# Patient Record
Sex: FEMALE | Race: BLACK OR AFRICAN AMERICAN | NOT HISPANIC OR LATINO | URBAN - METROPOLITAN AREA
[De-identification: names, ages, dates, MRNs, and addresses within clinical notes are randomized per-mention and may not be internally consistent; named-entity substitution may affect disease eponyms.]

---

## 2017-08-01 ENCOUNTER — EMERGENCY (EMERGENCY)
Facility: HOSPITAL | Age: 67
LOS: 0 days | Discharge: HOME | End: 2017-08-01
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
## 2017-08-01 DIAGNOSIS — M25.512 PAIN IN LEFT SHOULDER: ICD-10-CM

## 2017-08-01 DIAGNOSIS — M54.5 LOW BACK PAIN: ICD-10-CM

## 2017-08-01 DIAGNOSIS — M54.12 RADICULOPATHY, CERVICAL REGION: ICD-10-CM

## 2017-08-01 DIAGNOSIS — S39.012A STRAIN OF MUSCLE, FASCIA AND TENDON OF LOWER BACK, INITIAL ENCOUNTER: ICD-10-CM
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
## 2017-08-01 DIAGNOSIS — Z88.2 ALLERGY STATUS TO SULFONAMIDES: ICD-10-CM
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
## 2017-08-31 ENCOUNTER — OUTPATIENT (OUTPATIENT)
Dept: OUTPATIENT SERVICES | Facility: HOSPITAL | Age: 67
LOS: 1 days | Discharge: HOME | End: 2017-08-31

## 2017-08-31 ENCOUNTER — APPOINTMENT (OUTPATIENT)
Dept: INTERNAL MEDICINE | Facility: CLINIC | Age: 67
End: 2017-08-31

## 2017-08-31 VITALS
HEART RATE: 71 BPM | HEIGHT: 59 IN | DIASTOLIC BLOOD PRESSURE: 89 MMHG | BODY MASS INDEX: 42.94 KG/M2 | WEIGHT: 213 LBS | SYSTOLIC BLOOD PRESSURE: 147 MMHG

## 2017-08-31 DIAGNOSIS — R52 PAIN, UNSPECIFIED: ICD-10-CM

## 2017-08-31 DIAGNOSIS — Z78.9 OTHER SPECIFIED HEALTH STATUS: ICD-10-CM

## 2017-09-01 LAB — CYTOLOGY CVX/VAG DOC THIN PREP: NEGATIVE

## 2017-09-05 DIAGNOSIS — E78.00 PURE HYPERCHOLESTEROLEMIA, UNSPECIFIED: ICD-10-CM

## 2017-09-05 DIAGNOSIS — R73.9 HYPERGLYCEMIA, UNSPECIFIED: ICD-10-CM

## 2017-09-05 DIAGNOSIS — M54.2 CERVICALGIA: ICD-10-CM

## 2017-09-05 DIAGNOSIS — M54.5 LOW BACK PAIN: ICD-10-CM

## 2017-10-30 LAB
ALBUMIN SERPL-MCNC: 4.4 G/DL
ALBUMIN/GLOB SERPL: 1.38
ALP SERPL-CCNC: 69 IU/L
ALT SERPL-CCNC: 19 IU/L
ANA TITR SER: NEGATIVE
ANION GAP SERPL CALC-SCNC: 13 MEQ/L
AST SERPL-CCNC: 26 IU/L
BASOPHILS # BLD: 0.02 TH/MM3
BASOPHILS NFR BLD: 0.3 %
BILIRUB SERPL-MCNC: 0.7 MG/DL
BUN SERPL-MCNC: 12 MG/DL
BUN/CREAT SERPL: 14.5 %
CALCIUM SERPL-MCNC: 10 MG/DL
CHLORIDE SERPL-SCNC: 103 MEQ/L
CHOLEST SERPL-MCNC: 251 MG/DL
CO2 SERPL-SCNC: 26 MEQ/L
CREAT SERPL-MCNC: 0.83 MG/DL
CRP SERPL-MCNC: 0.5 MG/DL
DIFFERENTIAL METHOD BLD: NORMAL
EOSINOPHIL # BLD: 0.2 TH/MM3
EOSINOPHIL NFR BLD: 3.1 %
ERYTHROCYTE [DISTWIDTH] IN BLOOD BY AUTOMATED COUNT: 14.1 %
ERYTHROCYTE [SEDIMENTATION RATE] IN BLOOD: 43 MM/HR
ESTIMATED AVERGAGE GLUCOSE (NORTH): 128 MG/DL
GFR SERPL CREATININE-BSD FRML MDRD: 83
GLUCOSE SERPL-MCNC: 93 MG/DL
GRANULOCYTES # BLD: 2.89 TH/MM3
GRANULOCYTES NFR BLD: 44.9 %
HBA1C MFR BLD: 6.1 %
HCT VFR BLD AUTO: 42.1 %
HDLC SERPL-MCNC: 70 MG/DL
HDLC SERPL: 3.59
HGB BLD-MCNC: 13.6 G/DL
IMM GRANULOCYTES # BLD: 0.02 TH/MM3
IMM GRANULOCYTES NFR BLD: 0.3 %
LDLC SERPL DIRECT ASSAY-MCNC: 163 MG/DL
LYMPHOCYTES # BLD: 2.73 TH/MM3
LYMPHOCYTES NFR BLD: 42.5 %
MCH RBC QN AUTO: 28.7 PG
MCHC RBC AUTO-ENTMCNC: 32.3 G/DL
MCV RBC AUTO: 88.8 FL
MONOCYTES # BLD: 0.57 TH/MM3
MONOCYTES NFR BLD: 8.9 %
PLATELET # BLD: 428 TH/MM3
PMV BLD AUTO: 9.8 FL
POTASSIUM SERPL-SCNC: 4.4 MMOL/L
PROT SERPL-MCNC: 7.6 G/DL
RBC # BLD AUTO: 4.74 MIL/MM3
SODIUM SERPL-SCNC: 142 MEQ/L
TRIGL SERPL-MCNC: 75 MG/DL
VLDLC SERPL-MCNC: 15 MG/DL
WBC # BLD: 6.43 TH/MM3

## 2017-10-31 LAB — RHEUMATOID FACT SERPL-ACNC: < 14 IU/ML

## 2020-07-14 ENCOUNTER — EMERGENCY (EMERGENCY)
Facility: HOSPITAL | Age: 70
LOS: 0 days | Discharge: HOME | End: 2020-07-14
Attending: EMERGENCY MEDICINE | Admitting: EMERGENCY MEDICINE
Payer: MEDICARE

## 2020-07-14 VITALS
SYSTOLIC BLOOD PRESSURE: 153 MMHG | TEMPERATURE: 99 F | RESPIRATION RATE: 18 BRPM | DIASTOLIC BLOOD PRESSURE: 76 MMHG | HEART RATE: 96 BPM | OXYGEN SATURATION: 96 %

## 2020-07-14 DIAGNOSIS — R53.1 WEAKNESS: ICD-10-CM

## 2020-07-14 DIAGNOSIS — R05 COUGH: ICD-10-CM

## 2020-07-14 DIAGNOSIS — R42 DIZZINESS AND GIDDINESS: ICD-10-CM

## 2020-07-14 DIAGNOSIS — R55 SYNCOPE AND COLLAPSE: ICD-10-CM

## 2020-07-14 LAB
ALBUMIN SERPL ELPH-MCNC: 4.4 G/DL — SIGNIFICANT CHANGE UP (ref 3.5–5.2)
ALP SERPL-CCNC: 77 U/L — SIGNIFICANT CHANGE UP (ref 30–115)
ALT FLD-CCNC: 16 U/L — SIGNIFICANT CHANGE UP (ref 0–41)
ANION GAP SERPL CALC-SCNC: 12 MMOL/L — SIGNIFICANT CHANGE UP (ref 7–14)
APPEARANCE UR: CLEAR — SIGNIFICANT CHANGE UP
AST SERPL-CCNC: 20 U/L — SIGNIFICANT CHANGE UP (ref 0–41)
BASOPHILS # BLD AUTO: 0.02 K/UL — SIGNIFICANT CHANGE UP (ref 0–0.2)
BASOPHILS NFR BLD AUTO: 0.3 % — SIGNIFICANT CHANGE UP (ref 0–1)
BILIRUB SERPL-MCNC: 0.4 MG/DL — SIGNIFICANT CHANGE UP (ref 0.2–1.2)
BILIRUB UR-MCNC: NEGATIVE — SIGNIFICANT CHANGE UP
BUN SERPL-MCNC: 12 MG/DL — SIGNIFICANT CHANGE UP (ref 10–20)
CALCIUM SERPL-MCNC: 9.7 MG/DL — SIGNIFICANT CHANGE UP (ref 8.5–10.1)
CHLORIDE SERPL-SCNC: 98 MMOL/L — SIGNIFICANT CHANGE UP (ref 98–110)
CO2 SERPL-SCNC: 27 MMOL/L — SIGNIFICANT CHANGE UP (ref 17–32)
COLOR SPEC: COLORLESS — SIGNIFICANT CHANGE UP
CREAT SERPL-MCNC: 0.9 MG/DL — SIGNIFICANT CHANGE UP (ref 0.7–1.5)
DIFF PNL FLD: NEGATIVE — SIGNIFICANT CHANGE UP
EOSINOPHIL # BLD AUTO: 0.1 K/UL — SIGNIFICANT CHANGE UP (ref 0–0.7)
EOSINOPHIL NFR BLD AUTO: 1.4 % — SIGNIFICANT CHANGE UP (ref 0–8)
GLUCOSE SERPL-MCNC: 165 MG/DL — HIGH (ref 70–99)
GLUCOSE UR QL: NEGATIVE — SIGNIFICANT CHANGE UP
HCT VFR BLD CALC: 42.8 % — SIGNIFICANT CHANGE UP (ref 37–47)
HGB BLD-MCNC: 13.8 G/DL — SIGNIFICANT CHANGE UP (ref 12–16)
IMM GRANULOCYTES NFR BLD AUTO: 0.1 % — SIGNIFICANT CHANGE UP (ref 0.1–0.3)
KETONES UR-MCNC: NEGATIVE — SIGNIFICANT CHANGE UP
LEUKOCYTE ESTERASE UR-ACNC: NEGATIVE — SIGNIFICANT CHANGE UP
LYMPHOCYTES # BLD AUTO: 1.54 K/UL — SIGNIFICANT CHANGE UP (ref 1.2–3.4)
LYMPHOCYTES # BLD AUTO: 22.3 % — SIGNIFICANT CHANGE UP (ref 20.5–51.1)
MCHC RBC-ENTMCNC: 28.9 PG — SIGNIFICANT CHANGE UP (ref 27–31)
MCHC RBC-ENTMCNC: 32.2 G/DL — SIGNIFICANT CHANGE UP (ref 32–37)
MCV RBC AUTO: 89.5 FL — SIGNIFICANT CHANGE UP (ref 81–99)
MONOCYTES # BLD AUTO: 0.46 K/UL — SIGNIFICANT CHANGE UP (ref 0.1–0.6)
MONOCYTES NFR BLD AUTO: 6.6 % — SIGNIFICANT CHANGE UP (ref 1.7–9.3)
NEUTROPHILS # BLD AUTO: 4.79 K/UL — SIGNIFICANT CHANGE UP (ref 1.4–6.5)
NEUTROPHILS NFR BLD AUTO: 69.3 % — SIGNIFICANT CHANGE UP (ref 42.2–75.2)
NITRITE UR-MCNC: NEGATIVE — SIGNIFICANT CHANGE UP
NRBC # BLD: 0 /100 WBCS — SIGNIFICANT CHANGE UP (ref 0–0)
PH UR: 7 — SIGNIFICANT CHANGE UP (ref 5–8)
PLATELET # BLD AUTO: 412 K/UL — HIGH (ref 130–400)
POTASSIUM SERPL-MCNC: 4.4 MMOL/L — SIGNIFICANT CHANGE UP (ref 3.5–5)
POTASSIUM SERPL-SCNC: 4.4 MMOL/L — SIGNIFICANT CHANGE UP (ref 3.5–5)
PROT SERPL-MCNC: 7.9 G/DL — SIGNIFICANT CHANGE UP (ref 6–8)
PROT UR-MCNC: NEGATIVE — SIGNIFICANT CHANGE UP
RBC # BLD: 4.78 M/UL — SIGNIFICANT CHANGE UP (ref 4.2–5.4)
RBC # FLD: 13.1 % — SIGNIFICANT CHANGE UP (ref 11.5–14.5)
SODIUM SERPL-SCNC: 137 MMOL/L — SIGNIFICANT CHANGE UP (ref 135–146)
SP GR SPEC: 1 — LOW (ref 1.01–1.02)
TROPONIN T SERPL-MCNC: <0.01 NG/ML — SIGNIFICANT CHANGE UP
UROBILINOGEN FLD QL: SIGNIFICANT CHANGE UP
WBC # BLD: 6.92 K/UL — SIGNIFICANT CHANGE UP (ref 4.8–10.8)
WBC # FLD AUTO: 6.92 K/UL — SIGNIFICANT CHANGE UP (ref 4.8–10.8)

## 2020-07-14 PROCEDURE — 93010 ELECTROCARDIOGRAM REPORT: CPT

## 2020-07-14 PROCEDURE — 99285 EMERGENCY DEPT VISIT HI MDM: CPT

## 2020-07-14 RX ORDER — SODIUM CHLORIDE 9 MG/ML
1000 INJECTION INTRAMUSCULAR; INTRAVENOUS; SUBCUTANEOUS ONCE
Refills: 0 | Status: COMPLETED | OUTPATIENT
Start: 2020-07-14 | End: 2020-07-14

## 2020-07-14 RX ADMIN — SODIUM CHLORIDE 1000 MILLILITER(S): 9 INJECTION INTRAMUSCULAR; INTRAVENOUS; SUBCUTANEOUS at 15:18

## 2020-07-14 NOTE — ED PROVIDER NOTE - NS ED ROS FT
Constitutional: + gen weakness. no fever, chills, no recent weight loss, change in appetite or malaise  Eyes: no redness/discharge/pain/vision changes  ENT: no rhinorrhea/ear pain/sore throat  Cardiac: No chest pain, SOB or edema.  Respiratory: No cough or respiratory distress  GI: No nausea, vomiting, diarrhea or abdominal pain.  : No dysuria, frequency, urgency or hematuria  MS: no pain to back or extremities, no loss of ROM, no weakness  Neuro: + lightheadedness. No headache. No LOC.  Skin: No skin rash.  Endocrine: No history of thyroid disease or diabetes.  Except as documented in the HPI, all other systems are negative.

## 2020-07-14 NOTE — ED PROVIDER NOTE - OBJECTIVE STATEMENT
69 year old F with no pmhx c/o gen weakness/lightheadedness x 1 week. Pt sts was hot in her apartment because AC is broken so went for walk outside. Pt felt weak/lightheaded and had episode of near syncope so called EMS. Pt now c/o gen weakness. + chronic dry cough. Denies any assoc fever, sob, chest pain, n/v/d/abd pain, urinary symptoms, headache, palpitations.

## 2020-07-14 NOTE — ED PROVIDER NOTE - NSFOLLOWUPINSTRUCTIONS_ED_ALL_ED_FT
Near-Syncope    Near-syncope is when you suddenly become weak or dizzy, or you feel like you might pass out (faint). During an episode of near-syncope, you may:  Feel dizzy or light-headed.  Feel nauseous.  See all white or all black in your field of vision.  Have cold, clammy skin.  This condition is caused by a sudden decrease in blood flow to the brain. This decrease can result from various causes, but most of those causes are not dangerous. However, near-syncope can be a sign of a serious medical problem, so it is important to seek medical care.    If you fainted, get medical help right away.Call your local emergency services ([phone] in the [place] Do not drive yourself to the hospital.    Please follow up with your primary care doctor within one week.  Follow these instructions at home:    Pay attention to any changes in your symptoms. Take these actions to help with your condition:  Have someone stay with you until you feel stable.  Do not drive, use machinery, or play sports until your health care provider says it is okay.  Keep all follow-up visits as told by your health care provider. This is important.  If you start to feel like you might faint, lie down right away and raise (elevate) your feet above the level of your heart. Breathe deeply and steadily. Wait until all of the symptoms have passed.  Drink enough fluid to keep your urine clear or pale yellow.  If you are taking blood pressure or heart medicine, get up slowly and take several minutes to sit and then stand. This can reduce dizziness.  Take over-the-counter and prescription medicines only as told by your health care provider.  Get help right away if:  You have a severe headache.  You have unusual pain in your chest, abdomen, or back.  You are bleeding from your mouth or rectum, or you have black or tarry stool.  You have a very fast or irregular heartbeat (palpitations).  You faint once or repeatedly.  You have a seizure.  You are confused.  You have trouble walking.  You have severe weakness.  You have vision problems.  These symptoms may represent a serious problem that is an emergency. Do not wait to see if your symptoms will go away. Get medical help right away. Call your local emergency services (661 in the [place] Do not drive yourself to the hospital.     This information is not intended to replace advice given to you by your health care provider. Make sure you discuss any questions you have with your health care provider.

## 2020-07-14 NOTE — ED PROVIDER NOTE - PHYSICAL EXAMINATION
CONSTITUTIONAL: Well-appearing; well-nourished; in no apparent distress.   EYES: PERRL; EOM intact.   ENT: normal nose; no rhinorrhea; normal pharynx with no tonsillar hypertrophy.   NECK: Supple; non-tender; no cervical lymphadenopathy.  CARDIOVASCULAR: Normal S1, S2; no murmurs, rubs, or gallops.   RESPIRATORY: Normal chest excursion with respiration; breath sounds clear and equal bilaterally; no wheezes, rhonchi, or rales.  GI/: Normal bowel sounds; non-distended; non-tender; no palpable organomegaly.   MS: No evidence of trauma or deformity.  Normal ROM in all four extremities; non-tender to palpation; distal pulses are normal.   SKIN: Normal for age and race; warm; dry; good turgor; no apparent lesions or exudate.   NEURO/PSYCH: A & O x 4; grossly unremarkable. mood and manner are appropriate. No focal deficits. No facial droop. No tongue deviation. Cerebellar intact. Normal gait. Sensation intact

## 2020-07-14 NOTE — ED PROVIDER NOTE - NSFOLLOWUPCLINICS_GEN_ALL_ED_FT
[facility]  Medicine  [address], [place]   Phone: [phone]  Fax:   Follow Up Time:

## 2020-07-14 NOTE — ED PROVIDER NOTE - CLINICAL SUMMARY MEDICAL DECISION MAKING FREE TEXT BOX
evaluated for lightheadeness, here patient is stable with nml ambulation and asymptomatic, her exam is nml and lab work is nml. no signs of ischemia or electrolyte abn.

## 2020-07-14 NOTE — ED PROVIDER NOTE - PATIENT PORTAL LINK FT
You can access the FollowMyHealth Patient Portal offered by [facility] by registering at the following website: [email or web address]. By joining [facility]’s FollowMyHealth portal, you will also be able to view your health information using other applications (apps) compatible with our system.

## 2020-07-14 NOTE — ED PROVIDER NOTE - ATTENDING CONTRIBUTION TO CARE
1 week of generalized weakness in the setting of broken AC and being in the heat. she states she went outside to get fresh air and felt lightheaded so called ems, denies chest pain, sob, headache. now symptoms have resolved. exam shows nml gait, nml finger to nose, lungs and heart nml, abd soft, ext nml. plan is to obtain basic labs, ivf hydration and reassess.

## 2020-07-14 NOTE — ED ADULT NURSE NOTE - NSIMPLEMENTINTERV_GEN_ALL_ED
Implemented All Universal Safety Interventions:  [place] to call system. Call bell, personal items and telephone within reach. Instruct patient to call for assistance. Room bathroom lighting operational. Non-slip footwear when patient is off stretcher. Physically safe environment: no spills, clutter or unnecessary equipment. Stretcher in lowest position, wheels locked, appropriate side rails in place.

## 2020-07-15 PROCEDURE — 71046 X-RAY EXAM CHEST 2 VIEWS: CPT | Mod: 26

## 2020-08-05 ENCOUNTER — OUTPATIENT (OUTPATIENT)
Dept: OUTPATIENT SERVICES | Facility: HOSPITAL | Age: 70
LOS: 1 days | Discharge: HOME | End: 2020-08-05

## 2020-08-05 ENCOUNTER — APPOINTMENT (OUTPATIENT)
Dept: INTERNAL MEDICINE | Facility: CLINIC | Age: 70
End: 2020-08-05
Payer: MEDICARE

## 2020-08-05 VITALS
HEIGHT: 59 IN | HEART RATE: 77 BPM | SYSTOLIC BLOOD PRESSURE: 137 MMHG | DIASTOLIC BLOOD PRESSURE: 85 MMHG | BODY MASS INDEX: 43.34 KG/M2 | TEMPERATURE: 98.4 F | WEIGHT: 215 LBS

## 2020-08-05 DIAGNOSIS — R25.1 TREMOR, UNSPECIFIED: ICD-10-CM

## 2020-08-05 PROCEDURE — 99214 OFFICE O/P EST MOD 30 MIN: CPT | Mod: GC

## 2020-08-05 RX ORDER — METHOCARBAMOL 500 MG/1
500 TABLET, FILM COATED ORAL
Refills: 0 | Status: DISCONTINUED | COMMUNITY
End: 2020-08-05

## 2020-08-05 RX ORDER — ASPIRIN 81 MG/1
81 TABLET, CHEWABLE ORAL
Refills: 0 | Status: DISCONTINUED | COMMUNITY
End: 2020-08-05

## 2020-08-05 RX ORDER — DICLOFENAC SODIUM 75 MG/1
75 TABLET, DELAYED RELEASE ORAL TWICE DAILY
Qty: 28 | Refills: 0 | Status: DISCONTINUED | COMMUNITY
Start: 2017-08-31 | End: 2020-08-05

## 2020-08-05 RX ORDER — UBIDECARENONE/VIT E ACET 100MG-5
25 MCG CAPSULE ORAL
Refills: 0 | Status: DISCONTINUED | COMMUNITY
End: 2020-08-05

## 2020-08-05 RX ORDER — GABAPENTIN 300 MG/1
300 CAPSULE ORAL TWICE DAILY
Qty: 60 | Refills: 3 | Status: DISCONTINUED | COMMUNITY
Start: 2017-08-31 | End: 2020-08-05

## 2020-08-05 NOTE — HISTORY OF PRESENT ILLNESS
[FreeTextEntry1] : ER follow up  [de-identified] : Pt is 69 year old female with PMHx of neck and lower back pain, pre-DM, DLD, right eye surgery?, presents for follow up after ER visit on 7/14/2020. Pt was last seen 3 years ago. She went to hospital after becoming very hot in her apt and felt weak, shaky, and about to collapse. Pt was seen in ED and labs, CXR, and EKG were unremarkable. Pt states that she often gets "shakes" in all four extremities. No LOC, loss of urine or bowel control, or any other symptoms described. Pt also Reports ongoing tinnitus and head/ear fullness for many years which was previously worked up by ENT as per pt and no cause was found.

## 2020-08-05 NOTE — ASSESSMENT
[FreeTextEntry1] : Pt is 69 year old female with PMHx of neck and lower back pain, pre-DM, DLD, right eye surgery?, presents for follow up after ER visit on 7/14/2020. \par \par # intermittent weakness with "shaking"\par - recent labs in ED unremarkable\par - EKG unremarkable\par - will order TSH, Vit D\par \par # ongoing visual disturbance- cataracts noted + possible diabetic eye disease\par - had procedure at [facility] a number of years ago, unsure nature of procedure\par - Ophthal referral\par - neuro referral\par - f/u  A1c\par - artificial tears\par \par # ear fullness and head pressure\par - reports negative workup by ENT in the past but still ongoing\par - ENT referral\par \par # Pre-DM\par - elevated blood glucose in ED\par - 8/2017 A1c 6.1- f/u repeat A1c\par - diet and lifestyle modification encouraged\par \par # HCM\par - pt declines mammo for cultural reasons\par - pt defers colonoscopy for later date\par - pap in 2015 neg as per pt\par - pt had PPSV in 2015, defers [id number] for later date\par - Tdap last 1/2012\par - f/u A1c if podiatry indicated

## 2020-08-05 NOTE — REVIEW OF SYSTEMS
[Fatigue] : fatigue [Redness] : redness [Cough] : cough [Vision Problems] : vision problems [Headache] : headache [Muscle Pain] : muscle pain [Back Pain] : back pain [Negative] : Heme/Lymph [Insomnia] : no insomnia [Suicidal] : not suicidal [Anxiety] : no anxiety [Depression] : no depression [FreeTextEntry4] : tinnitus, ear fullness [FreeTextEntry2] : generalized weakness

## 2020-08-05 NOTE — END OF VISIT
[] : Resident [FreeTextEntry3] : I personally discussed this patient with the resident at the time of the visit.  And I was present with the resident during the key portions of the history and exam.  I agree with the assessment and plan as written, unless noted below.\par \par Pt. was seen in ER 7/14/20 for generalized weakness, and lightheadedness, shakiness here for follow up.  Pt. c/o visual disturbances, and headache/lightheadedness and sometime feels her heartbeat through her ear when lying in bed, no chest pain, no sob, no palpitation.  Will refer pt. to ENT, and neurology for evaluation.  On exam, pt. has b/l cataract, will refer to ophthal.  Pt. declined mammogram for culture reason, and defer colorectal cancer screening.  Will order blood work, and follow up visit in 3 months

## 2020-08-05 NOTE — PHYSICAL EXAM
[No Acute Distress] : no acute distress [EOMI] : extraocular movements intact [No JVD] : no jugular venous distention [No Respiratory Distress] : no respiratory distress  [No Accessory Muscle Use] : no accessory muscle use [Clear to Auscultation] : lungs were clear to auscultation bilaterally [Regular Rhythm] : with a regular rhythm [Normal Rate] : normal rate  [Normal S1, S2] : normal S1 and S2 [Soft] : abdomen soft [Non-distended] : non-distended [Non Tender] : non-tender [Normal Bowel Sounds] : normal bowel sounds [No CVA Tenderness] : no CVA  tenderness [No Spinal Tenderness] : no spinal tenderness [No Rash] : no rash [Coordination Grossly Intact] : coordination grossly intact [Grossly Normal Strength/Tone] : grossly normal strength/tone [No Focal Deficits] : no focal deficits [Normal Affect] : the affect was normal [de-identified] : obese [de-identified] : injected conjunctiva, BL cataracts noted

## 2020-08-10 DIAGNOSIS — R25.1 TREMOR, UNSPECIFIED: ICD-10-CM

## 2020-08-10 DIAGNOSIS — R73.9 HYPERGLYCEMIA, UNSPECIFIED: ICD-10-CM

## 2020-08-10 DIAGNOSIS — M54.5 LOW BACK PAIN: ICD-10-CM

## 2020-08-10 DIAGNOSIS — Z00.00 ENCOUNTER FOR GENERAL ADULT MEDICAL EXAMINATION WITHOUT ABNORMAL FINDINGS: ICD-10-CM

## 2020-08-10 DIAGNOSIS — H26.9 UNSPECIFIED CATARACT: ICD-10-CM

## 2020-08-10 DIAGNOSIS — E78.5 HYPERLIPIDEMIA, UNSPECIFIED: ICD-10-CM

## 2020-08-12 LAB
25(OH)D3 SERPL-MCNC: 20 NG/ML
CHOLEST SERPL-MCNC: 257 MG/DL
CHOLEST/HDLC SERPL: 3.8 RATIO
ESTIMATED AVERAGE GLUCOSE: 154 MG/DL
HBA1C MFR BLD HPLC: 7 %
HDLC SERPL-MCNC: 68 MG/DL
LDLC SERPL CALC-MCNC: 178 MG/DL
TRIGL SERPL-MCNC: 75 MG/DL
TSH SERPL-ACNC: 1 UIU/ML
VIT B12 SERPL-MCNC: 922 PG/ML

## 2020-08-18 ENCOUNTER — OUTPATIENT (OUTPATIENT)
Dept: OUTPATIENT SERVICES | Facility: HOSPITAL | Age: 70
LOS: 1 days | Discharge: HOME | End: 2020-08-18
Payer: MEDICARE

## 2020-08-18 PROCEDURE — 92004 COMPRE OPH EXAM NEW PT 1/>: CPT

## 2020-08-19 ENCOUNTER — APPOINTMENT (OUTPATIENT)
Dept: INTERNAL MEDICINE | Facility: CLINIC | Age: 70
End: 2020-08-19
Payer: MEDICARE

## 2020-08-19 ENCOUNTER — OUTPATIENT (OUTPATIENT)
Dept: OUTPATIENT SERVICES | Facility: HOSPITAL | Age: 70
LOS: 1 days | Discharge: HOME | End: 2020-08-19

## 2020-08-19 DIAGNOSIS — E11.9 TYPE 2 DIABETES MELLITUS WITHOUT COMPLICATIONS: ICD-10-CM

## 2020-08-19 DIAGNOSIS — E78.5 HYPERLIPIDEMIA, UNSPECIFIED: ICD-10-CM

## 2020-08-19 DIAGNOSIS — E55.9 VITAMIN D DEFICIENCY, UNSPECIFIED: ICD-10-CM

## 2020-08-19 PROCEDURE — 99213 OFFICE O/P EST LOW 20 MIN: CPT | Mod: GC

## 2020-08-19 NOTE — HISTORY OF PRESENT ILLNESS
[FreeTextEntry1] : FOLLOW UP [de-identified] : 69 yr F with chronic back pain, pre-DM, and DLD  presents for follow up,\par pt was seen last week for annual and had her labs this week,\par labs shows elevated A1c 7, and elevated cholesterol and LDL

## 2020-08-19 NOTE — PHYSICAL EXAM
[Normal Rate] : normal rate  [Regular Rhythm] : with a regular rhythm [Normal S1, S2] : normal S1 and S2 [No Murmur] : no murmur heard [Soft] : abdomen soft [Non Tender] : non-tender [Non-distended] : non-distended [No Masses] : no abdominal mass palpated [No HSM] : no HSM [Normal Bowel Sounds] : normal bowel sounds [No Hernias] : no hernias [Normal] : affect was normal and insight and judgment were intact

## 2020-08-19 NOTE — REVIEW OF SYSTEMS
[Negative] : Psychiatric [Chest Pain] : no chest pain [Palpitations] : no palpitations [Leg Claudication] : no leg claudication [Lower Ext Edema] : no lower extremity edema [Orthopnea] : no orthopnea [Paroxysmal Nocturnal Dyspnea] : no paroxysmal nocturnal dyspnea [Shortness Of Breath] : no shortness of breath [Wheezing] : no wheezing [Dyspnea on Exertion] : no dyspnea on exertion [Cough] : no cough [Abdominal Pain] : no abdominal pain [Nausea] : no nausea [Constipation] : no constipation [Diarrhea] : diarrhea [Vomiting] : no vomiting [Melena] : no melena [Heartburn] : no heartburn

## 2020-08-19 NOTE — END OF VISIT
[] : Resident [FreeTextEntry3] : I personally discussed this patient with the resident at the time of the visit.  And I was present with the resident during the key portions of the history and exam.  I agree with the assessment and plan as written, unless noted below.\par \par Pt. has A1C 7, , 25-OH vitamin D 20.  Will start pt. on metformin 500mg twice daily, atorvastatin 40mg at bedtime, and advised pt. to take OTC vitamin D3 2000unit daily.  Pt. states she saw her ophthalmologist yesterday, and was told she had mild cataract right eye, and glaucoma on left eye.  Advised pt. to obtain copy of eye consultation.  Advised pt. to follow up ENT and neurologist.  Follow up visit in 3 months

## 2020-08-19 NOTE — ASSESSMENT
[FreeTextEntry1] : 69 yr F with chronic back pain, pre-DM, and DLD  presents for follow up,\par \par \par # intermittent weakness with "shaking"\par - recent labs in ED unremarkable\par - EKG unremarkable\par - TSH WNL, Vit D L 20, start supplements Vit -D 2000 QD\par \par #bilateral Cataract with ongoing visual disturbance\par - Ophthal referral for cataract and DM screening \par - neuro referral\par - artificial tears\par \par # ear fullness and head pressure\par - reports negative workup by ENT in the past but still ongoing\par - ENT referral\par \par #DLD:\par - cholesterol 257, , ASCVD 17,5, start Lipitor 40mg \par \par #DM, A1c is 7, 8/202\par - start metformin 500mg BID\par - diet and lifestyle modification encouraged\par \par # HCM\par - pt declines mammo for cultural reasons\par - pt defers colonoscopy for later date\par - pap in 2015 neg as per pt\par - pt had PPSV in 2015, defers [id number] for later date\par - Tdap last 1/2012

## 2020-08-20 ENCOUNTER — APPOINTMENT (OUTPATIENT)
Dept: OTOLARYNGOLOGY | Facility: CLINIC | Age: 70
End: 2020-08-20
Payer: MEDICARE

## 2020-08-20 VITALS — BODY MASS INDEX: 43.34 KG/M2 | WEIGHT: 215 LBS | HEIGHT: 59 IN

## 2020-08-20 DIAGNOSIS — H61.21 IMPACTED CERUMEN, RIGHT EAR: ICD-10-CM

## 2020-08-20 PROCEDURE — 31575 DIAGNOSTIC LARYNGOSCOPY: CPT

## 2020-08-20 PROCEDURE — 92557 COMPREHENSIVE HEARING TEST: CPT

## 2020-08-20 PROCEDURE — 99204 OFFICE O/P NEW MOD 45 MIN: CPT | Mod: 25

## 2020-08-20 PROCEDURE — 92550 TYMPANOMETRY & REFLEX THRESH: CPT

## 2020-08-20 PROCEDURE — 99203 OFFICE O/P NEW LOW 30 MIN: CPT | Mod: 25

## 2020-08-20 NOTE — PHYSICAL EXAM
[Midline] : trachea located in midline position [Normal] : orientation to person, place, and time: normal [de-identified] : right cerumen impaction

## 2020-08-20 NOTE — HISTORY OF PRESENT ILLNESS
[de-identified] : Patient presents today with c/o tinnitus. Patient admits it has been present for months. B/l ears. Patient states getting worse recently because it is happening all day long. No hearing loss. H/o ear infections when she was younger. Patient has pain and clogged ears when being on a plane. Pt feel clogged and cannot hear after a plane ride for several days. This has been occurring for several years and it is worsening.   Respiratory

## 2020-08-20 NOTE — PROCEDURE
[Flexible Endoscope] : examined with the flexible endoscope [Complicated Symptoms] : complicated symptoms requiring more thorough examination than provided by mirror [True Vocal Cords Erythematous] : bilateral true vocal cord edema [Glottis Arytenoid Cartilages Erythema] : bilateral arytenoid ~M erythema [Normal] : posterior cricoid area had healthy pink mucosa in the interarytenoid area and the esophageal inlet

## 2020-08-24 ENCOUNTER — OUTPATIENT (OUTPATIENT)
Dept: OUTPATIENT SERVICES | Facility: HOSPITAL | Age: 70
LOS: 1 days | Discharge: HOME | End: 2020-08-24
Payer: MEDICARE

## 2020-08-24 PROCEDURE — 92020 GONIOSCOPY: CPT

## 2020-08-24 PROCEDURE — 92133 CPTRZD OPH DX IMG PST SGM ON: CPT | Mod: 26

## 2020-08-24 PROCEDURE — 92012 INTRM OPH EXAM EST PATIENT: CPT

## 2020-08-27 ENCOUNTER — OUTPATIENT (OUTPATIENT)
Dept: OUTPATIENT SERVICES | Facility: HOSPITAL | Age: 70
LOS: 1 days | Discharge: HOME | End: 2020-08-27
Payer: MEDICARE

## 2020-08-27 PROCEDURE — 92012 INTRM OPH EXAM EST PATIENT: CPT

## 2020-09-02 ENCOUNTER — EMERGENCY (EMERGENCY)
Facility: HOSPITAL | Age: 70
LOS: 0 days | Discharge: HOME | End: 2020-09-03
Attending: EMERGENCY MEDICINE | Admitting: EMERGENCY MEDICINE
Payer: MEDICARE

## 2020-09-02 VITALS
RESPIRATION RATE: 17 BRPM | HEART RATE: 102 BPM | SYSTOLIC BLOOD PRESSURE: 143 MMHG | WEIGHT: 223.11 LBS | OXYGEN SATURATION: 97 % | TEMPERATURE: 99 F | DIASTOLIC BLOOD PRESSURE: 89 MMHG

## 2020-09-02 LAB
ALBUMIN SERPL ELPH-MCNC: 4.3 G/DL — SIGNIFICANT CHANGE UP (ref 3.5–5.2)
ALP SERPL-CCNC: 71 U/L — SIGNIFICANT CHANGE UP (ref 30–115)
ALT FLD-CCNC: 16 U/L — SIGNIFICANT CHANGE UP (ref 0–41)
ANION GAP SERPL CALC-SCNC: 13 MMOL/L — SIGNIFICANT CHANGE UP (ref 7–14)
APTT BLD: 31.5 SEC — SIGNIFICANT CHANGE UP (ref 27–39.2)
AST SERPL-CCNC: 25 U/L — SIGNIFICANT CHANGE UP (ref 0–41)
BASOPHILS # BLD AUTO: 0.03 K/UL — SIGNIFICANT CHANGE UP (ref 0–0.2)
BASOPHILS NFR BLD AUTO: 0.4 % — SIGNIFICANT CHANGE UP (ref 0–1)
BILIRUB SERPL-MCNC: 0.3 MG/DL — SIGNIFICANT CHANGE UP (ref 0.2–1.2)
BUN SERPL-MCNC: 15 MG/DL — SIGNIFICANT CHANGE UP (ref 10–20)
CALCIUM SERPL-MCNC: 10.2 MG/DL — HIGH (ref 8.5–10.1)
CHLORIDE SERPL-SCNC: 100 MMOL/L — SIGNIFICANT CHANGE UP (ref 98–110)
CO2 SERPL-SCNC: 25 MMOL/L — SIGNIFICANT CHANGE UP (ref 17–32)
CREAT SERPL-MCNC: 1.1 MG/DL — SIGNIFICANT CHANGE UP (ref 0.7–1.5)
D DIMER BLD IA.RAPID-MCNC: 255 NG/ML DDU — HIGH (ref 0–230)
EOSINOPHIL # BLD AUTO: 0.3 K/UL — SIGNIFICANT CHANGE UP (ref 0–0.7)
EOSINOPHIL NFR BLD AUTO: 4.1 % — SIGNIFICANT CHANGE UP (ref 0–8)
GLUCOSE SERPL-MCNC: 119 MG/DL — HIGH (ref 70–99)
HCT VFR BLD CALC: 45 % — SIGNIFICANT CHANGE UP (ref 37–47)
HGB BLD-MCNC: 14.4 G/DL — SIGNIFICANT CHANGE UP (ref 12–16)
IMM GRANULOCYTES NFR BLD AUTO: 0.1 % — SIGNIFICANT CHANGE UP (ref 0.1–0.3)
INR BLD: 1.03 RATIO — SIGNIFICANT CHANGE UP (ref 0.65–1.3)
LYMPHOCYTES # BLD AUTO: 2.38 K/UL — SIGNIFICANT CHANGE UP (ref 1.2–3.4)
LYMPHOCYTES # BLD AUTO: 32.5 % — SIGNIFICANT CHANGE UP (ref 20.5–51.1)
MCHC RBC-ENTMCNC: 28.5 PG — SIGNIFICANT CHANGE UP (ref 27–31)
MCHC RBC-ENTMCNC: 32 G/DL — SIGNIFICANT CHANGE UP (ref 32–37)
MCV RBC AUTO: 89.1 FL — SIGNIFICANT CHANGE UP (ref 81–99)
MONOCYTES # BLD AUTO: 0.65 K/UL — HIGH (ref 0.1–0.6)
MONOCYTES NFR BLD AUTO: 8.9 % — SIGNIFICANT CHANGE UP (ref 1.7–9.3)
NEUTROPHILS # BLD AUTO: 3.95 K/UL — SIGNIFICANT CHANGE UP (ref 1.4–6.5)
NEUTROPHILS NFR BLD AUTO: 54 % — SIGNIFICANT CHANGE UP (ref 42.2–75.2)
NRBC # BLD: 0 /100 WBCS — SIGNIFICANT CHANGE UP (ref 0–0)
NT-PROBNP SERPL-SCNC: 31 PG/ML — SIGNIFICANT CHANGE UP (ref 0–300)
PLATELET # BLD AUTO: 460 K/UL — HIGH (ref 130–400)
POTASSIUM SERPL-MCNC: 4.7 MMOL/L — SIGNIFICANT CHANGE UP (ref 3.5–5)
POTASSIUM SERPL-SCNC: 4.7 MMOL/L — SIGNIFICANT CHANGE UP (ref 3.5–5)
PROT SERPL-MCNC: 7.8 G/DL — SIGNIFICANT CHANGE UP (ref 6–8)
PROTHROM AB SERPL-ACNC: 11.9 SEC — SIGNIFICANT CHANGE UP (ref 9.95–12.87)
RBC # BLD: 5.05 M/UL — SIGNIFICANT CHANGE UP (ref 4.2–5.4)
RBC # FLD: 13.1 % — SIGNIFICANT CHANGE UP (ref 11.5–14.5)
SODIUM SERPL-SCNC: 138 MMOL/L — SIGNIFICANT CHANGE UP (ref 135–146)
TROPONIN T SERPL-MCNC: <0.01 NG/ML — SIGNIFICANT CHANGE UP
WBC # BLD: 7.32 K/UL — SIGNIFICANT CHANGE UP (ref 4.8–10.8)
WBC # FLD AUTO: 7.32 K/UL — SIGNIFICANT CHANGE UP (ref 4.8–10.8)

## 2020-09-02 PROCEDURE — 71045 X-RAY EXAM CHEST 1 VIEW: CPT | Mod: 26

## 2020-09-02 PROCEDURE — 71275 CT ANGIOGRAPHY CHEST: CPT | Mod: 26

## 2020-09-02 PROCEDURE — 93010 ELECTROCARDIOGRAM REPORT: CPT

## 2020-09-02 PROCEDURE — 99220: CPT | Mod: CS

## 2020-09-02 RX ORDER — DIPHENHYDRAMINE HCL 50 MG
50 CAPSULE ORAL ONCE
Refills: 0 | Status: COMPLETED | OUTPATIENT
Start: 2020-09-02 | End: 2020-09-02

## 2020-09-02 RX ORDER — ALBUTEROL 90 UG/1
1 AEROSOL, METERED ORAL ONCE
Refills: 0 | Status: COMPLETED | OUTPATIENT
Start: 2020-09-02 | End: 2020-09-02

## 2020-09-02 RX ADMIN — ALBUTEROL 1 PUFF(S): 90 AEROSOL, METERED ORAL at 18:38

## 2020-09-02 RX ADMIN — Medication 50 MILLIGRAM(S): at 22:47

## 2020-09-02 RX ADMIN — Medication 125 MILLIGRAM(S): at 22:47

## 2020-09-02 NOTE — ED PROVIDER NOTE - OBJECTIVE STATEMENT
69 year old female w hx of HLD, DM, glaucoma, GERD presents to the ED for evaluation of intermittent shortness of breath since July 2020. Patient states she feels chest tightness and as though she cannot take a full breath. Over the last few days has also developed mid sternal chest pain when attempting to take a deep breath. Denies fevers/chills, hemoptysis, leg pain/swelling, skin changes, recent travel/hospitalizations/surgeries/trauma, hormonal supplements, or prior or fhx of DVT/PE. No prior cardiac work up.

## 2020-09-02 NOTE — ED CDU PROVIDER INITIAL DAY NOTE - ATTENDING CONTRIBUTION TO CARE
68 yo F with PMH of DM, HTN, GERT placed in observation for SOB. Concern for cardiac etiology.     Const: Well nourished, well developed, appears stated age  Eyes: PERRL, no conjunctival injection  HENT:  Neck supple without meningismus   CV: RRR, Warm, well-perfused extremities  RESP: CTA B/L, no tachypnea   GI: soft, non-tender, non-distended  MSK: No gross deformities appreciated  Skin: Warm, dry. No rashes  Neuro: Alert, CNs II-XII grossly intact. Sensation and motor function of extremities grossly intact.  Psych: Anxious

## 2020-09-02 NOTE — ED PROVIDER NOTE - CLINICAL SUMMARY MEDICAL DECISION MAKING FREE TEXT BOX
70 yo female with PMH of HLD, fibroid, DM, Vit D def, cervical radiculopathy, glaucoma, cataract presents to the ER for SOB since July. States at that time she felt SOB likely from broken AC units. Now that is fixed she is still feeling some SOB, and mid chest pain on and off since then. +dry cough, +on/off lightheadedness. No abdomen pain/N/V/D/rash/leg swelling/dysuria/neck pain/HA/palpitations. Exam ncat, perrl, eomi, lungs ctab, heart regular s1s2, abdomen soft nt/nd +BS, ext no calf tenderness, no pedal edema, distal pulses intact, Neuro intact. Had labs, ekg, xray--showed slight elevated D-dimer, therefore CTA ordered but was negative for PE or infiltrate. Pt concerned as it is very difficult to schedule rapid outpatient follow up, therefore placed in OBS for CP r/o acs.

## 2020-09-02 NOTE — ED PROVIDER NOTE - NS ED ROS FT
CONSTITUTIONAL: (-) fevers, (-) chills, (-) diaphoresis  ENT: (-) congestion, (-) rhinorrhea, (-) sore throat  CARDIO: (+) chest pain, (-) palpitations, (-) edema  PULM: (-) cough, (-) sputum, (-) chest tightness, (+) shortness of breath, (-) wheezing, (-) hemoptysis, (-) stridor  GI: (-) nausea, (-) vomiting, (-) diarrhea, (-) constipation, (-) abdominal pain, (-) melena, (-) hematochezia  : (-) dysuria, (-) hematuria, (-) frequency, (-) flank pain  ENDO: (-) polyuria, (-) polydipsia, (-) polyphagia  HEME: (-) easy bruising, (-) easy bleeding  MSK: (-) back pain, (-) myalgias, (-) gait difficulty  SKIN: (-) rashes, (-) pallor  NEURO: (-) headache, (-) dizziness, (-) lightheadedness,  (-) weakness, (-) syncope    *all other systems negative except as documented above and in the HPI*

## 2020-09-02 NOTE — ED CDU PROVIDER INITIAL DAY NOTE - MEDICAL DECISION MAKING DETAILS
68 yo F placed in observation for ACS workup. Troponin negative x2. CTA did not demonstrate any acute PE. Pt pending nuclear stress

## 2020-09-02 NOTE — ED PROVIDER NOTE - ATTENDING CONTRIBUTION TO CARE
68 yo female with PMH of HLD, fibroid, DM, Vit D def, cervical radiculopathy, glaucoma, cataract presents to the ER for SOB since July. States at that time she felt SOB likely from broken AC units. Now that is fixed she is still feeling some SOB, and mid chest pain on and off since then. +dry cough, +on/off lightheadedness. No abdomen pain/N/V/D/rash/leg swelling/dysuria/neck pain/HA/palpitations. Exam ncat, perrl, eomi, lungs ctab, heart regular s1s2, abdomen soft nt/nd +BS, ext no calf tenderness, no pedal edema, distal pulses intact, Neuro intact. Check labs, ekg, xray reassess.     ALL: sulfa-->hives  PMH above  Meds: atorvastatin, metformin, Allegra-D, Protonix, fluticasone, vit D, Alphagan drops, Dorzolamide eyes  SH: no smoking or etoh  PMD Wiley

## 2020-09-02 NOTE — ED ADULT NURSE REASSESSMENT NOTE - NS ED NURSE REASSESS COMMENT FT1
Pt received from previous RN. Pt assessed. Pt is awake and alert. Pt still c/o SOB and chest discomfort. 100% sat RA. Pt awaiting CTA. Cardiac and 02 monitoring maintained. Pt is not in any distress. Safety and comfort maintained. Will continue to monitor.

## 2020-09-02 NOTE — ED PROVIDER NOTE - PHYSICAL EXAMINATION
VITALS:  I have reviewed the initial vital signs.  GENERAL: Well-developed, well-nourished, in no acute distress. Nontoxic.  HEENT: Sclera clear. No conjunctival pallor. EOMI, PERRLA. Mucous membranes moist.  NECK: supple w FROM.  No JVD.  CARDIO: RRR, nl S1 and S2. No murmurs, rubs, or gallops. No peripheral edema. 2+ radial and pedal pulses bilaterally. No chest wall tenderness.  PULM: Normal effort. CTA b/l without wheezes, rales, or rhonchi.  MSK: Normal, steady gait. No leg warmth, swelling, erythema, or ttp.  GI: Abdomen soft and non-distended. Nontender.  SKIN: Warm, dry. Capillary refill <2 seconds. No pallor. No rash.   NEURO: A&Ox3. Speech clear. No focal deficits.  PSYCH: Calm and cooperative.

## 2020-09-02 NOTE — ED ADULT NURSE REASSESSMENT NOTE - NS ED NURSE REASSESS COMMENT FT1
Pt was a rapid at CT scan post contrast. Pt stated was SOB and ffelt tingling in the mouth. Pt is not in any distress. Vitals stable. 98% RA. MD to order benadryl and solumedrol. Safety and comfort maintained. Will continue to monitor

## 2020-09-02 NOTE — ED PROVIDER NOTE - PROGRESS NOTE DETAILS
SHELLEY: patient resting comfortably at this time, reports no shortness of breath or cp currently. VSS improved. discussed labs/xr results. cta pending. Will continue to monitor. SHELLEY: rapid response called to CT, patient received IV contrast and scan, began to feel "shaky" and cold. Pt speaking in full sentences, lungs cta b/l, no stridor, OP patent, no swelling, tolerating oral secretions. No rash. Pt brought back to ED, vitals WNL. Pt feeling better but still short of breath. Will give benadryl and solumedrol and continue to monitor. SHELLEY: patient feeling improved, CTA negative for PE. Will admit to [facility] BOX aware.

## 2020-09-02 NOTE — ED ADULT NURSE NOTE - NSIMPLEMENTINTERV_GEN_ALL_ED
Implemented All Universal Safety Interventions:  [place] to call system. Call bell, personal items and telephone within reach. Instruct patient to call for assistance. Room bathroom lighting operational. Non-slip footwear when patient is off stretcher. Physically safe environment: no spills, clutter or unnecessary equipment. Stretcher in lowest position, wheels locked, appropriate side rails in place.

## 2020-09-02 NOTE — ED CDU PROVIDER INITIAL DAY NOTE - OBJECTIVE STATEMENT
68y/o female with pmh of dm, htn, gerd, pt. c/o intermittent mid sternal cp for several years which recently worsened. + associated sob. denies fever, chills, cough, vomiting, abdominal pain. + cough. cp is pleuritic. non exertional . never smoker. no family hx of cad.

## 2020-09-02 NOTE — ED PROVIDER NOTE - CARE PLAN
Principal Discharge DX:	Chest pain Principal Discharge DX:	Chest pain  Secondary Diagnosis:	Lightheadedness

## 2020-09-03 VITALS
TEMPERATURE: 98 F | OXYGEN SATURATION: 100 % | SYSTOLIC BLOOD PRESSURE: 167 MMHG | RESPIRATION RATE: 17 BRPM | DIASTOLIC BLOOD PRESSURE: 77 MMHG | HEART RATE: 94 BPM

## 2020-09-03 LAB
SARS-COV-2 RNA SPEC QL NAA+PROBE: SIGNIFICANT CHANGE UP
TROPONIN T SERPL-MCNC: <0.01 NG/ML — SIGNIFICANT CHANGE UP

## 2020-09-03 PROCEDURE — 99217: CPT | Mod: CS

## 2020-09-03 PROCEDURE — 93018 CV STRESS TEST I&R ONLY: CPT

## 2020-09-03 PROCEDURE — 78452 HT MUSCLE IMAGE SPECT MULT: CPT | Mod: 26

## 2020-09-03 PROCEDURE — 93010 ELECTROCARDIOGRAM REPORT: CPT | Mod: 77

## 2020-09-03 PROCEDURE — 93010 ELECTROCARDIOGRAM REPORT: CPT

## 2020-09-03 PROCEDURE — 93016 CV STRESS TEST SUPVJ ONLY: CPT

## 2020-09-03 RX ORDER — ADENOSINE 3 MG/ML
60 INJECTION INTRAVENOUS ONCE
Refills: 0 | Status: COMPLETED | OUTPATIENT
Start: 2020-09-03 | End: 2020-09-03

## 2020-09-03 RX ADMIN — ADENOSINE 600 MILLIGRAM(S): 3 INJECTION INTRAVENOUS at 12:02

## 2020-09-03 NOTE — ED ADULT NURSE REASSESSMENT NOTE - NS ED NURSE REASSESS COMMENT FT1
pt has no c.o SOB/chest pain at this time. vital signs within normal limits. will cont to monitor patient

## 2020-09-03 NOTE — ED CDU PROVIDER DISPOSITION NOTE - CARE PROVIDER_API CALL
Suresh Whittaker  CARDIOVASCULAR DISEASE  [address] [place] [address]  Phone: [phone]  Fax: [phone]  Follow Up Time: Routine

## 2020-09-03 NOTE — ED CDU PROVIDER SUBSEQUENT DAY NOTE - PROGRESS NOTE DETAILS
trops negative x2. pt,. on cardiac monitor. in no distress, plan for nuclear stress test in the morning. pt in nad. called nuclear suite. ordered adenosine for stress test.

## 2020-09-03 NOTE — ED CDU PROVIDER DISPOSITION NOTE - ATTENDING CONTRIBUTION TO CARE
68 yo F placed in observation for evaluation of her CP. Pt has remained CP free while in the hospital. No acute events.     Const: Well nourished, well developed, appears stated age  Eyes: PERRL, no conjunctival injection  HENT:  Neck supple without meningismus   CV: RRR, Warm, well-perfused extremities  RESP: CTA B/L, no tachypnea   GI: soft, non-tender, non-distended  MSK: No gross deformities appreciated  Skin: Warm, dry. No rashes  Neuro: Alert, CNs II-XII grossly intact. Sensation and motor function of extremities grossly intact.  Psych: Appropriate mood and affect.

## 2020-09-03 NOTE — ED ADULT NURSE REASSESSMENT NOTE - NS ED NURSE REASSESS COMMENT FT1
Pt assessed. Pt is sleeping comfortably in bed. Pt denies pain or discomfort at this time. Denies SOB or chest pain . Pt awaiting Cardiac and o2 monitoring maintained. Pt not in any distress. Pt awaiting stress test in the morning. Safety and comfort maintained. Will continue to monitor

## 2020-09-03 NOTE — ED ADULT NURSE REASSESSMENT NOTE - NS ED NURSE REASSESS COMMENT FT1
Pt assessed. Pt placed on OBS for chest pain. Pt denies pain or discomfort at this time. Denies SOB. Second trop sent. Cardiac and o2 monitoring maintained. Pt not in any distress. Safety and comfort maintained. Will continue to monitor.

## 2020-09-03 NOTE — ED CDU PROVIDER DISPOSITION NOTE - PATIENT PORTAL LINK FT
You can access the FollowMyHealth Patient Portal offered by [facility] by registering at the following website: [email or web address]. By joining [employer]’s FollowMyHealth portal, you will also be able to view your health information using other applications (apps) compatible with our system.

## 2020-09-03 NOTE — ED CDU PROVIDER SUBSEQUENT DAY NOTE - ATTENDING CONTRIBUTION TO CARE
68 yo F placed in observation for evaluation of CP. No acute events. Pt remains CP free.     Const: Well nourished, well developed, appears stated age  Eyes: PERRL, no conjunctival injection  HENT:  Neck supple without meningismus   CV: RRR, Warm, well-perfused extremities  RESP: CTA B/L, no tachypnea   GI: soft, non-tender, non-distended  MSK: No gross deformities appreciated  Skin: Warm, dry. No rashes  Neuro: Alert, CNs II-XII grossly intact. Sensation and motor function of extremities grossly intact.  Psych: Appropriate mood and affect.

## 2020-09-04 PROBLEM — K21.9 GASTRO-ESOPHAGEAL REFLUX DISEASE WITHOUT ESOPHAGITIS: Chronic | Status: ACTIVE | Noted: 2020-09-02

## 2020-09-08 ENCOUNTER — EMERGENCY (EMERGENCY)
Facility: HOSPITAL | Age: 70
LOS: 0 days | Discharge: HOME | End: 2020-09-08
Attending: EMERGENCY MEDICINE | Admitting: EMERGENCY MEDICINE
Payer: MEDICARE

## 2020-09-08 VITALS
DIASTOLIC BLOOD PRESSURE: 73 MMHG | TEMPERATURE: 98 F | OXYGEN SATURATION: 100 % | HEART RATE: 99 BPM | WEIGHT: 223.11 LBS | RESPIRATION RATE: 18 BRPM | SYSTOLIC BLOOD PRESSURE: 139 MMHG

## 2020-09-08 DIAGNOSIS — F41.9 ANXIETY DISORDER, UNSPECIFIED: ICD-10-CM

## 2020-09-08 DIAGNOSIS — E11.9 TYPE 2 DIABETES MELLITUS WITHOUT COMPLICATIONS: ICD-10-CM

## 2020-09-08 DIAGNOSIS — K21.9 GASTRO-ESOPHAGEAL REFLUX DISEASE WITHOUT ESOPHAGITIS: ICD-10-CM

## 2020-09-08 DIAGNOSIS — Z91.041 RADIOGRAPHIC DYE ALLERGY STATUS: ICD-10-CM

## 2020-09-08 DIAGNOSIS — I10 ESSENTIAL (PRIMARY) HYPERTENSION: ICD-10-CM

## 2020-09-08 DIAGNOSIS — Z88.2 ALLERGY STATUS TO SULFONAMIDES: ICD-10-CM

## 2020-09-08 LAB
ALBUMIN SERPL ELPH-MCNC: 4.2 G/DL — SIGNIFICANT CHANGE UP (ref 3.5–5.2)
ALP SERPL-CCNC: 64 U/L — SIGNIFICANT CHANGE UP (ref 30–115)
ALT FLD-CCNC: 22 U/L — SIGNIFICANT CHANGE UP (ref 0–41)
ANION GAP SERPL CALC-SCNC: 13 MMOL/L — SIGNIFICANT CHANGE UP (ref 7–14)
AST SERPL-CCNC: 22 U/L — SIGNIFICANT CHANGE UP (ref 0–41)
BASOPHILS # BLD AUTO: 0.02 K/UL — SIGNIFICANT CHANGE UP (ref 0–0.2)
BASOPHILS NFR BLD AUTO: 0.3 % — SIGNIFICANT CHANGE UP (ref 0–1)
BILIRUB SERPL-MCNC: 0.3 MG/DL — SIGNIFICANT CHANGE UP (ref 0.2–1.2)
BUN SERPL-MCNC: 15 MG/DL — SIGNIFICANT CHANGE UP (ref 10–20)
CALCIUM SERPL-MCNC: 9.7 MG/DL — SIGNIFICANT CHANGE UP (ref 8.5–10.1)
CHLORIDE SERPL-SCNC: 100 MMOL/L — SIGNIFICANT CHANGE UP (ref 98–110)
CO2 SERPL-SCNC: 22 MMOL/L — SIGNIFICANT CHANGE UP (ref 17–32)
CREAT SERPL-MCNC: 0.7 MG/DL — SIGNIFICANT CHANGE UP (ref 0.7–1.5)
EOSINOPHIL # BLD AUTO: 0.25 K/UL — SIGNIFICANT CHANGE UP (ref 0–0.7)
EOSINOPHIL NFR BLD AUTO: 3.7 % — SIGNIFICANT CHANGE UP (ref 0–8)
GLUCOSE SERPL-MCNC: 167 MG/DL — HIGH (ref 70–99)
HCT VFR BLD CALC: 42.6 % — SIGNIFICANT CHANGE UP (ref 37–47)
HGB BLD-MCNC: 14 G/DL — SIGNIFICANT CHANGE UP (ref 12–16)
IMM GRANULOCYTES NFR BLD AUTO: 0.3 % — SIGNIFICANT CHANGE UP (ref 0.1–0.3)
LYMPHOCYTES # BLD AUTO: 2.23 K/UL — SIGNIFICANT CHANGE UP (ref 1.2–3.4)
LYMPHOCYTES # BLD AUTO: 32.7 % — SIGNIFICANT CHANGE UP (ref 20.5–51.1)
MAGNESIUM SERPL-MCNC: 2 MG/DL — SIGNIFICANT CHANGE UP (ref 1.8–2.4)
MCHC RBC-ENTMCNC: 28.6 PG — SIGNIFICANT CHANGE UP (ref 27–31)
MCHC RBC-ENTMCNC: 32.9 G/DL — SIGNIFICANT CHANGE UP (ref 32–37)
MCV RBC AUTO: 86.9 FL — SIGNIFICANT CHANGE UP (ref 81–99)
MONOCYTES # BLD AUTO: 0.52 K/UL — SIGNIFICANT CHANGE UP (ref 0.1–0.6)
MONOCYTES NFR BLD AUTO: 7.6 % — SIGNIFICANT CHANGE UP (ref 1.7–9.3)
NEUTROPHILS # BLD AUTO: 3.78 K/UL — SIGNIFICANT CHANGE UP (ref 1.4–6.5)
NEUTROPHILS NFR BLD AUTO: 55.4 % — SIGNIFICANT CHANGE UP (ref 42.2–75.2)
NRBC # BLD: 0 /100 WBCS — SIGNIFICANT CHANGE UP (ref 0–0)
PLATELET # BLD AUTO: 436 K/UL — HIGH (ref 130–400)
POTASSIUM SERPL-MCNC: 3.7 MMOL/L — SIGNIFICANT CHANGE UP (ref 3.5–5)
POTASSIUM SERPL-SCNC: 3.7 MMOL/L — SIGNIFICANT CHANGE UP (ref 3.5–5)
PROT SERPL-MCNC: 7.2 G/DL — SIGNIFICANT CHANGE UP (ref 6–8)
RBC # BLD: 4.9 M/UL — SIGNIFICANT CHANGE UP (ref 4.2–5.4)
RBC # FLD: 13 % — SIGNIFICANT CHANGE UP (ref 11.5–14.5)
SODIUM SERPL-SCNC: 135 MMOL/L — SIGNIFICANT CHANGE UP (ref 135–146)
TROPONIN T SERPL-MCNC: <0.01 NG/ML — SIGNIFICANT CHANGE UP
WBC # BLD: 6.82 K/UL — SIGNIFICANT CHANGE UP (ref 4.8–10.8)
WBC # FLD AUTO: 6.82 K/UL — SIGNIFICANT CHANGE UP (ref 4.8–10.8)

## 2020-09-08 PROCEDURE — 71045 X-RAY EXAM CHEST 1 VIEW: CPT | Mod: 26

## 2020-09-08 PROCEDURE — 99285 EMERGENCY DEPT VISIT HI MDM: CPT | Mod: GC

## 2020-09-08 PROCEDURE — 93010 ELECTROCARDIOGRAM REPORT: CPT

## 2020-09-08 RX ORDER — ALPRAZOLAM 0.25 MG
0.5 TABLET ORAL ONCE
Refills: 0 | Status: DISCONTINUED | OUTPATIENT
Start: 2020-09-08 | End: 2020-09-08

## 2020-09-08 RX ADMIN — Medication 0.5 MILLIGRAM(S): at 04:30

## 2020-09-08 NOTE — ED PROVIDER NOTE - NS ED ROS FT
Review of Systems:  CONSTITUTIONAL: No fever, No diaphoresis, No weight change  SKIN: No rash  HEMATOLOGIC: No abnormal bleeding or bruising  EYES: No eye pain, No blurred vision  ENT: No change in hearing, No sore throat, No neck pain, No rhinorrhea, No ear pain  RESPIRATORY: No shortness of breath, No cough  CARDIAC: No chest pain, No palpitations  GI: No abdominal pain, No nausea, No vomiting, No diarrhea, No constipation, No bright red blood per rectum or melena. No flank pain  : No dysuria, frequency, hematuria.   MUSCULOSKELETAL: No joint paint, No swelling, No back pain  NEUROLOGIC: No numbness, No focal weakness, No headache, No dizziness  PSYCH: Patient denies SI/HI, denies any self-harm attempt or OD, denies any other substance abuse/misuse, and denies AV hallucinations. +anxiety   All other systems negative, unless specified in HPI

## 2020-09-08 NOTE — ED PROVIDER NOTE - CLINICAL SUMMARY MEDICAL DECISION MAKING FREE TEXT BOX
68 yo female with pmh of DM, HTN, GERD presents to the ER for feeling shaky and anxious since July. Pt just here at [facility] a few days ago c/o of CP and had an OBS stay, with a nuclear stress test and labs done (all negative). Pt states she follows up with Dr Whittaker, and does have an outpatient Neuro eval set up as well (because of her shaky, nervous feelings). Today states her apartment was warm and when she turned on AC high, she started to feel too cold, then got anxious and shaky. No N/V/sweats/F/C/CP/SOB/syncope. Sometimes lightheaded. Exam as per resident note. EKG, CXR and labs ordered and results show no acute disease, +mild cardiomegaly on xray which was same as previous xray. Recommend pt follow up as scheduled with her specialists and also pt admits to feeling anxious more often so referred to outpatient mental health services. Return for any worsening.

## 2020-09-08 NOTE — ED PROVIDER NOTE - NSFOLLOWUPCLINICS_GEN_ALL_ED_FT
[facility]  OP Mental [facility]  [address] [place] [id number]  Phone: [phone]  Fax:   Follow Up Time:

## 2020-09-08 NOTE — ED PROVIDER NOTE - ATTENDING CONTRIBUTION TO CARE
70 yo female with pmh of DM, HTN, GERD presents to the ER for feeling shaky and anxious since July. Pt just here at [facility] a few days ago c/o of CP and had an OBS stay, with a nuclear stress test and labs done (all negative). Pt states she follows up with Dr Whittaker, and does have an outpatient Neuro eval set up as well (because of her shaky, nervous feelings). Today states her apartment was warm and when she turned on AC high, she started to feel too cold, then got anxious and shaky. No N/V/sweats/F/C/CP/SOB/syncope. Sometimes lightheaded. Exam as per resident note. EKG, CXR and labs ordered and results show no acute disease, +mild cardiomegaly on xray which was same as previous xray. Recommend pt follow up as scheduled with her specialists and also pt admits to feeling anxious more often so referred to outpatient mental health services. Return for any worsening.

## 2020-09-08 NOTE — ED PROVIDER NOTE - OBJECTIVE STATEMENT
68 y/o F PMHx DM, HTN, GERD presents to ED with anxiety since July. Pt reports that since July she has been having intermittent weakness, palpitations and SOB. Pt follows with Dr. Whittaker, had stress test this week that was negative. Pt reports today feeling "anxious and feels like her whole body is shaking". Pt states her apartment is "too warm so she has to blast the air conditioner which makes her anxious and cold." Denies incontinence. No fevers/chills. No syncope.

## 2020-09-08 NOTE — ED PROVIDER NOTE - PATIENT PORTAL LINK FT
You can access the FollowMyHealth Patient Portal offered by [facility] by registering at the following website: [email or web address]. By joining [employer]’s FollowMyHealth portal, you will also be able to view your health information using other applications (apps) compatible with our system.

## 2020-09-08 NOTE — ED PROVIDER NOTE - PROGRESS NOTE DETAILS
DL: Labwork, CXR and EKG all within normal limits. Patient denies ever seeing psychiatrist but has appointments with neurology, cardiology and her PMD. Strict return precautions explained to patient who verbalized understanding. DL: Labwork, CXR and EKG all within normal limits. Patient denies ever seeing psychiatrist but has appointments with neurology, cardiology and her PMD. Pt advised to see psychiatrist regarding her anxiety. Strict return precautions explained to patient who verbalized understanding.

## 2020-09-08 NOTE — ED ADULT TRIAGE NOTE - CHIEF COMPLAINT QUOTE
Patient BIBA states that she has been experiencing worsening weakness since July. Patient also states that she is experiencing jerking movements over night. States that she is scared.

## 2020-09-08 NOTE — ED ADULT NURSE NOTE - OBJECTIVE STATEMENT
Pt presents to ED with c/o weakness since last month. Per patient she is "feeling scared" a lot. Pt denies CP.

## 2020-09-08 NOTE — ED PROVIDER NOTE - NSFOLLOWUPINSTRUCTIONS_ED_ALL_ED_FT
Anxiety    Generalized anxiety disorder (WILBUR) is a mental disorder. It is defined as anxiety that is not necessarily related to specific events or activities or is out of proportion to said events. Symptoms include restlessness, fatigue, difficulty concentrations, irritability and difficulty concentrating. It interferes with life functions, including relationships, work, and school. If you were started on a medication make sure to take exactly as prescribed and follow up with a psychiatrist.    SEEK IMMEDIATE MEDICAL CARE IF YOU HAVE THE FOLLOWING SYMPTOMS: thoughts about hurting killing yourself, thoughts about hurting or killing somebody else, hallucinations, or worsening depression.

## 2020-09-08 NOTE — ED PROVIDER NOTE - CARE PROVIDER_API CALL
Graciela Selby  INTERNAL MEDICINE  [address] [place] [id number]  Phone: [phone]  Fax: [phone]  Follow Up Time:

## 2020-09-08 NOTE — ED ADULT NURSE NOTE - CHPI ED NUR SYMPTOMS NEG
no fever/no pain/no dizziness/no vomiting/no tingling/no nausea/no chills/no decreased eating/drinking

## 2020-09-08 NOTE — ED PROVIDER NOTE - PHYSICAL EXAMINATION
CONSTITUTIONAL: Well-developed; well-nourished; in no acute distress.   SKIN: warm, dry  HEAD: Normocephalic; atraumatic.  EYES: no conjunctival injection. EOMI.   ENT: No nasal discharge; airway clear.  NECK: Supple; non tender.  CARD: S1, S2 normal; no murmurs, gallops, or rubs. Regular rate and rhythm.   RESP: No wheezes, rales or rhonchi.  ABD: soft ntnd.   EXT: Normal ROM.  No clubbing, cyanosis or edema.   LYMPH: No acute cervical adenopathy.  NEURO: AOx3, CN 2-12 grossly intact. +5/5 strength and sensation wnl in all extremities. No pronator drift, no dysarthria, no ataxia.    PSYCH: Cooperative, appropriate.

## 2020-09-10 ENCOUNTER — APPOINTMENT (OUTPATIENT)
Dept: CARDIOLOGY | Facility: CLINIC | Age: 70
End: 2020-09-10
Payer: MEDICARE

## 2020-09-10 ENCOUNTER — RESULT CHARGE (OUTPATIENT)
Age: 70
End: 2020-09-10

## 2020-09-10 VITALS
HEART RATE: 102 BPM | TEMPERATURE: 97 F | BODY MASS INDEX: 44.55 KG/M2 | HEIGHT: 59 IN | DIASTOLIC BLOOD PRESSURE: 82 MMHG | SYSTOLIC BLOOD PRESSURE: 130 MMHG | WEIGHT: 221 LBS

## 2020-09-10 PROCEDURE — 99204 OFFICE O/P NEW MOD 45 MIN: CPT

## 2020-09-10 PROCEDURE — 93000 ELECTROCARDIOGRAM COMPLETE: CPT

## 2020-09-10 NOTE — REASON FOR VISIT
[FreeTextEntry2] : atypical chest pain [Initial Evaluation] : an initial evaluation of [FreeTextEntry1] : seen in er c/o chest pain\par atypical chest pain\par trop was neg\par nuclear exam was negative\par ef was 72%\par patient is obese with anxiety issues\par Hypertensive also and has hypercholesteremia\par In addition, she has elevated blood sugar\par Offers no c/o sob - ct in er was negative for pulmonary embolus\par no chest pain that is suggestive of cardiac in nature\par no pvd, tia or othe caardiac symptomotology

## 2020-09-10 NOTE — PHYSICAL EXAM
[General Appearance - Well Developed] : well developed [Well Groomed] : well groomed [Normal Appearance] : normal appearance [General Appearance - Well Nourished] : well nourished [No Deformities] : no deformities [Eyelids - No Xanthelasma] : the eyelids demonstrated no xanthelasmas [Normal Conjunctiva] : the conjunctiva exhibited no abnormalities [General Appearance - In No Acute Distress] : no acute distress [Normal Oral Mucosa] : normal oral mucosa [No Oral Cyanosis] : no oral cyanosis [No Oral Pallor] : no oral pallor [Normal Jugular Venous A Waves Present] : normal jugular venous A waves present [Normal Jugular Venous V Waves Present] : normal jugular venous V waves present [No Jugular Venous Arambula A Waves] : no jugular venous arambula A waves [Heart Sounds] : normal S1 and S2 [Heart Rate And Rhythm] : heart rate and rhythm were normal [Murmurs] : no murmurs present [Respiration, Rhythm And Depth] : normal respiratory rhythm and effort [Exaggerated Use Of Accessory Muscles For Inspiration] : no accessory muscle use [Auscultation Breath Sounds / Voice Sounds] : lungs were clear to auscultation bilaterally [Abdomen Soft] : soft [Abdomen Tenderness] : non-tender [Abdomen Mass (___ Cm)] : no abdominal mass palpated [Abnormal Walk] : normal gait [Gait - Sufficient For Exercise Testing] : the gait was sufficient for exercise testing [Nail Clubbing] : no clubbing of the fingernails [Cyanosis, Localized] : no localized cyanosis [Petechial Hemorrhages (___cm)] : no petechial hemorrhages [Skin Color & Pigmentation] : normal skin color and pigmentation [] : no rash [Skin Lesions] : no skin lesions [No Venous Stasis] : no venous stasis [No Xanthoma] : no  xanthoma was observed [No Skin Ulcers] : no skin ulcer [Affect] : the affect was normal [Oriented To Time, Place, And Person] : oriented to person, place, and time [Mood] : the mood was normal [No Anxiety] : not feeling anxious

## 2020-09-17 ENCOUNTER — APPOINTMENT (OUTPATIENT)
Dept: INTERNAL MEDICINE | Facility: CLINIC | Age: 70
End: 2020-09-17
Payer: MEDICARE

## 2020-09-17 ENCOUNTER — OUTPATIENT (OUTPATIENT)
Dept: OUTPATIENT SERVICES | Facility: HOSPITAL | Age: 70
LOS: 1 days | Discharge: HOME | End: 2020-09-17

## 2020-09-17 VITALS
TEMPERATURE: 97.2 F | DIASTOLIC BLOOD PRESSURE: 84 MMHG | BODY MASS INDEX: 45.56 KG/M2 | WEIGHT: 226 LBS | HEIGHT: 59 IN | HEART RATE: 108 BPM | SYSTOLIC BLOOD PRESSURE: 150 MMHG

## 2020-09-17 PROCEDURE — 99213 OFFICE O/P EST LOW 20 MIN: CPT | Mod: GC

## 2020-09-17 NOTE — PLAN
[FreeTextEntry1] : 69 yr F with chronic back pain, DM, and DLD presents for follow up,\par \par # Vitmain D deficiency: on vitamin d3 2000unit daily\par - recent labs in ED unremarkable\par - EKG unremarkable\par - TSH WNL\par - continue OTC Vitamin D3\par \par #DLD:\par - cholesterol 257, , ASCVD 17.5, \par - c/w Lipitor 40mg at bedtime \par \par #DM, A1c is 7, 8/20/20\par - c/w  metformin 500mg BID, \par - diet and lifestyle modification encouraged\par - Annual Eye and podiatry\par \par # HCM\par - pt declines mammo for cultural reasons\par - pt defers colonoscopy for later date\par - pap in 2015 neg as per pt\par - pt had PPSV in 2015, [id number] today deferred\par - Tdap last 1/2012. \par - flu vaccine deferred

## 2020-09-17 NOTE — HISTORY OF PRESENT ILLNESS
[FreeTextEntry1] : 69 years old female with medical history of chronic back pain, DM, and DLD presents for follow up,\par  \par  \par

## 2020-09-17 NOTE — END OF VISIT
[] : Resident [FreeTextEntry3] : I personally discussed this patient with the resident at the time of the visit.  And I was present with the resident during the key portions of the history and exam.  I agree with the assessment and plan as written, unless noted below.\par \par Pt. is moving down to [place] for 6 months and to live with her daughter who will care for her while she is down there.  Pt. has been taking metformin 500mg twice daily, atorvastatin 40mg daily and OTC D3 2000unit daily without any adverse effect.  Pt. declined any HCM include mammogram, pap smear.  Declined flu vaccine and [id number] vaccine.  Pt. defers podiatry evaluation and defers GI referral for colorectal cancer screening until after return from [place].  Had ophthal evaluation 8/18/20.  Advised pt. to follow MD while down in [place] for her DM.  Follow up visit once return back to [place].   \par

## 2020-09-21 DIAGNOSIS — E11.9 TYPE 2 DIABETES MELLITUS WITHOUT COMPLICATIONS: ICD-10-CM

## 2020-09-21 DIAGNOSIS — E55.9 VITAMIN D DEFICIENCY, UNSPECIFIED: ICD-10-CM

## 2020-09-21 DIAGNOSIS — Z00.00 ENCOUNTER FOR GENERAL ADULT MEDICAL EXAMINATION WITHOUT ABNORMAL FINDINGS: ICD-10-CM

## 2020-09-21 DIAGNOSIS — E78.5 HYPERLIPIDEMIA, UNSPECIFIED: ICD-10-CM

## 2020-10-07 NOTE — ED ADULT NURSE NOTE - FINAL NURSING ELECTRONIC SIGNATURE
[FreeTextEntry1] : Patient is tolerating prednisone and has not had effect yet so will increase to 30mg QD and f/u in 2 months. 
03-Sep-2020 13:53

## 2020-10-08 ENCOUNTER — APPOINTMENT (OUTPATIENT)
Dept: OTOLARYNGOLOGY | Facility: CLINIC | Age: 70
End: 2020-10-08
Payer: MEDICARE

## 2020-10-08 VITALS — WEIGHT: 210 LBS | BODY MASS INDEX: 42.33 KG/M2 | HEIGHT: 59 IN

## 2020-10-08 DIAGNOSIS — R22.1 LOCALIZED SWELLING, MASS AND LUMP, NECK: ICD-10-CM

## 2020-10-08 DIAGNOSIS — K21.9 GASTRO-ESOPHAGEAL REFLUX DISEASE W/OUT ESOPHAGITIS: ICD-10-CM

## 2020-10-08 PROCEDURE — 99213 OFFICE O/P EST LOW 20 MIN: CPT | Mod: 25

## 2020-10-08 PROCEDURE — 31575 DIAGNOSTIC LARYNGOSCOPY: CPT

## 2020-10-08 NOTE — ASSESSMENT
[FreeTextEntry1] : Pt will be referred for tinnitus management. Pt will start to felisha off of reflux.

## 2020-10-08 NOTE — PROCEDURE
[Complicated Symptoms] : complicated symptoms requiring more thorough examination than provided by mirror [Flexible Endoscope] : examined with the flexible endoscope [True Vocal Cords Erythematous] : bilateral true vocal cord edema [Glottis Arytenoid Cartilages Erythema] : bilateral arytenoid ~M erythema [Normal] : posterior cricoid area had healthy pink mucosa in the interarytenoid area and the esophageal inlet

## 2020-10-17 ENCOUNTER — EMERGENCY (EMERGENCY)
Facility: HOSPITAL | Age: 70
LOS: 0 days | Discharge: HOME | End: 2020-10-17
Attending: EMERGENCY MEDICINE | Admitting: EMERGENCY MEDICINE
Payer: MEDICARE

## 2020-10-17 VITALS
DIASTOLIC BLOOD PRESSURE: 72 MMHG | RESPIRATION RATE: 18 BRPM | SYSTOLIC BLOOD PRESSURE: 139 MMHG | HEART RATE: 93 BPM | OXYGEN SATURATION: 96 % | TEMPERATURE: 99 F

## 2020-10-17 VITALS
WEIGHT: 212.08 LBS | SYSTOLIC BLOOD PRESSURE: 178 MMHG | DIASTOLIC BLOOD PRESSURE: 93 MMHG | HEART RATE: 115 BPM | OXYGEN SATURATION: 96 % | TEMPERATURE: 98 F | RESPIRATION RATE: 18 BRPM

## 2020-10-17 DIAGNOSIS — Z88.2 ALLERGY STATUS TO SULFONAMIDES: ICD-10-CM

## 2020-10-17 DIAGNOSIS — K21.9 GASTRO-ESOPHAGEAL REFLUX DISEASE WITHOUT ESOPHAGITIS: ICD-10-CM

## 2020-10-17 DIAGNOSIS — E11.9 TYPE 2 DIABETES MELLITUS WITHOUT COMPLICATIONS: ICD-10-CM

## 2020-10-17 DIAGNOSIS — I10 ESSENTIAL (PRIMARY) HYPERTENSION: ICD-10-CM

## 2020-10-17 DIAGNOSIS — Z91.041 RADIOGRAPHIC DYE ALLERGY STATUS: ICD-10-CM

## 2020-10-17 LAB
ALBUMIN SERPL ELPH-MCNC: 4.2 G/DL — SIGNIFICANT CHANGE UP (ref 3.5–5.2)
ALP SERPL-CCNC: 79 U/L — SIGNIFICANT CHANGE UP (ref 30–115)
ALT FLD-CCNC: 18 U/L — SIGNIFICANT CHANGE UP (ref 0–41)
ANION GAP SERPL CALC-SCNC: 10 MMOL/L — SIGNIFICANT CHANGE UP (ref 7–14)
AST SERPL-CCNC: 20 U/L — SIGNIFICANT CHANGE UP (ref 0–41)
B-OH-BUTYR SERPL-SCNC: <0.2 MMOL/L — SIGNIFICANT CHANGE UP
BASE EXCESS BLDV CALC-SCNC: 2.2 MMOL/L — HIGH (ref -2–2)
BASOPHILS # BLD AUTO: 0.04 K/UL — SIGNIFICANT CHANGE UP (ref 0–0.2)
BASOPHILS NFR BLD AUTO: 0.6 % — SIGNIFICANT CHANGE UP (ref 0–1)
BILIRUB SERPL-MCNC: 0.2 MG/DL — SIGNIFICANT CHANGE UP (ref 0.2–1.2)
BUN SERPL-MCNC: 12 MG/DL — SIGNIFICANT CHANGE UP (ref 10–20)
CA-I SERPL-SCNC: 1.2 MMOL/L — SIGNIFICANT CHANGE UP (ref 1.12–1.3)
CALCIUM SERPL-MCNC: 9.6 MG/DL — SIGNIFICANT CHANGE UP (ref 8.5–10.1)
CHLORIDE SERPL-SCNC: 103 MMOL/L — SIGNIFICANT CHANGE UP (ref 98–110)
CO2 SERPL-SCNC: 25 MMOL/L — SIGNIFICANT CHANGE UP (ref 17–32)
CREAT SERPL-MCNC: 1.1 MG/DL — SIGNIFICANT CHANGE UP (ref 0.7–1.5)
EOSINOPHIL # BLD AUTO: 0.16 K/UL — SIGNIFICANT CHANGE UP (ref 0–0.7)
EOSINOPHIL NFR BLD AUTO: 2.3 % — SIGNIFICANT CHANGE UP (ref 0–8)
GAS PNL BLDV: 138 MMOL/L — SIGNIFICANT CHANGE UP (ref 136–145)
GAS PNL BLDV: SIGNIFICANT CHANGE UP
GLUCOSE SERPL-MCNC: 165 MG/DL — HIGH (ref 70–99)
HCO3 BLDV-SCNC: 28 MMOL/L — SIGNIFICANT CHANGE UP (ref 22–29)
HCT VFR BLD CALC: 42.3 % — SIGNIFICANT CHANGE UP (ref 37–47)
HCT VFR BLDA CALC: 43.5 % — SIGNIFICANT CHANGE UP (ref 34–44)
HGB BLD CALC-MCNC: 14.2 G/DL — SIGNIFICANT CHANGE UP (ref 14–18)
HGB BLD-MCNC: 13.9 G/DL — SIGNIFICANT CHANGE UP (ref 12–16)
IMM GRANULOCYTES NFR BLD AUTO: 0.4 % — HIGH (ref 0.1–0.3)
LACTATE BLDV-MCNC: 1.1 MMOL/L — SIGNIFICANT CHANGE UP (ref 0.5–1.6)
LYMPHOCYTES # BLD AUTO: 1.94 K/UL — SIGNIFICANT CHANGE UP (ref 1.2–3.4)
LYMPHOCYTES # BLD AUTO: 28.4 % — SIGNIFICANT CHANGE UP (ref 20.5–51.1)
MCHC RBC-ENTMCNC: 28.4 PG — SIGNIFICANT CHANGE UP (ref 27–31)
MCHC RBC-ENTMCNC: 32.9 G/DL — SIGNIFICANT CHANGE UP (ref 32–37)
MCV RBC AUTO: 86.3 FL — SIGNIFICANT CHANGE UP (ref 81–99)
MONOCYTES # BLD AUTO: 0.6 K/UL — SIGNIFICANT CHANGE UP (ref 0.1–0.6)
MONOCYTES NFR BLD AUTO: 8.8 % — SIGNIFICANT CHANGE UP (ref 1.7–9.3)
NEUTROPHILS # BLD AUTO: 4.07 K/UL — SIGNIFICANT CHANGE UP (ref 1.4–6.5)
NEUTROPHILS NFR BLD AUTO: 59.5 % — SIGNIFICANT CHANGE UP (ref 42.2–75.2)
NRBC # BLD: 0 /100 WBCS — SIGNIFICANT CHANGE UP (ref 0–0)
PCO2 BLDV: 47 MMHG — SIGNIFICANT CHANGE UP (ref 41–51)
PH BLDV: 7.39 — SIGNIFICANT CHANGE UP (ref 7.26–7.43)
PLATELET # BLD AUTO: 476 K/UL — HIGH (ref 130–400)
PO2 BLDV: 50 MMHG — HIGH (ref 20–40)
POTASSIUM BLDV-SCNC: 3.7 MMOL/L — SIGNIFICANT CHANGE UP (ref 3.3–5.6)
POTASSIUM SERPL-MCNC: 4.1 MMOL/L — SIGNIFICANT CHANGE UP (ref 3.5–5)
POTASSIUM SERPL-SCNC: 4.1 MMOL/L — SIGNIFICANT CHANGE UP (ref 3.5–5)
PROT SERPL-MCNC: 7.3 G/DL — SIGNIFICANT CHANGE UP (ref 6–8)
RBC # BLD: 4.9 M/UL — SIGNIFICANT CHANGE UP (ref 4.2–5.4)
RBC # FLD: 13.1 % — SIGNIFICANT CHANGE UP (ref 11.5–14.5)
SAO2 % BLDV: 85 % — SIGNIFICANT CHANGE UP
SODIUM SERPL-SCNC: 138 MMOL/L — SIGNIFICANT CHANGE UP (ref 135–146)
WBC # BLD: 6.84 K/UL — SIGNIFICANT CHANGE UP (ref 4.8–10.8)
WBC # FLD AUTO: 6.84 K/UL — SIGNIFICANT CHANGE UP (ref 4.8–10.8)

## 2020-10-17 PROCEDURE — 99284 EMERGENCY DEPT VISIT MOD MDM: CPT

## 2020-10-17 NOTE — ED PROVIDER NOTE - OBJECTIVE STATEMENT
pt presents to ED reporting fluctuating FS at home, admits to checking FS hourly sometimes and even in the middle of a meal. pt has been very nervous about "dropping to low", however admits never under 100. recent dx of dm 2 mo ago, started on metformin 500mg bid. has f/u appt next month. Denies fever/chill/HA/dizziness/chest pain/palpitation/sob/abd pain/n/v/d/ black stool/bloody stool/urinary sxs

## 2020-10-17 NOTE — ED PROVIDER NOTE - ATTENDING CONTRIBUTION TO CARE
70 y.o. female, recently diagnosed diabetic, comes in for fluctuating FS's. States she was started on Metformin and has been checking her FS every hour and it ranges from 100-220. Pt is very nervous that her sugar will drop. No other symptoms. No fever/chills, CP/SOB, abdominal pain, n/v/c/d. On exam, pt in NAD, AAOx3, head NC/AT, CN II-XII intact, lungs CTA B/L, CV S1S2 regular, abdomen soft/NT/ND/(+)BS, ext (-) edema, motor 5/5x4, sensation intact. Labs reviewed. Pt reassured. Will d/c with PMD follow up.

## 2020-10-17 NOTE — ED PROVIDER NOTE - PATIENT PORTAL LINK FT
You can access the FollowMyHealth Patient Portal offered by [facility] by registering at the following website: [email or web address]. By joining [employer]’s FollowMyHealth portal, you will also be able to view your health information using other applications (apps) compatible with our system.

## 2020-10-17 NOTE — ED PROVIDER NOTE - NSFOLLOWUPINSTRUCTIONS_ED_ALL_ED_FT
You have diabetes. Please continue to take your medications, maintain a consistent low carbohydrate diet, and follow up with your primary care doctor who will oversee your blood sugar levels and adjust your treatment as necessary.

## 2020-10-27 ENCOUNTER — APPOINTMENT (OUTPATIENT)
Dept: NEUROLOGY | Facility: CLINIC | Age: 70
End: 2020-10-27
Payer: MEDICARE

## 2020-10-27 ENCOUNTER — OUTPATIENT (OUTPATIENT)
Dept: OUTPATIENT SERVICES | Facility: HOSPITAL | Age: 70
LOS: 1 days | Discharge: HOME | End: 2020-10-27

## 2020-10-27 VITALS
HEIGHT: 59 IN | HEART RATE: 109 BPM | BODY MASS INDEX: 44.35 KG/M2 | DIASTOLIC BLOOD PRESSURE: 90 MMHG | SYSTOLIC BLOOD PRESSURE: 157 MMHG | TEMPERATURE: 98.8 F | WEIGHT: 220 LBS

## 2020-10-27 PROCEDURE — 99204 OFFICE O/P NEW MOD 45 MIN: CPT | Mod: GC

## 2020-10-27 NOTE — HISTORY OF PRESENT ILLNESS
[FreeTextEntry1] : 70F w/ pmh DM, DLD, chronic back pain, tinnitus, vit D deficiency presenting w/ c/o HA. \par \par The patient states that her headaches began over 5 years ago and describes it as sharp, radiating from back of the head, throughout the scalp and radiates to the right face and associate with dental pain, states pain worsened by airplane rides. She endorses headache episodes once every two weeks, worse on awakening. She was seen by her ENT doctor for her symptoms and was prescribed decongestants and ear plugs to use prior to travel. She denies nausea, vomiting, lightheadedness, LOC, photophobia, phonophobia, or head trauma. The patient also notes chronic numbness and tingling of the right proximal leg with associate lower back pain. Reports being previously dx with spinal pain.

## 2020-10-27 NOTE — ASSESSMENT
[FreeTextEntry1] : 70F w/ pmh DM, DLD, chronic back pain, tinnitus, vit D deficiency presenting w/ c/o worsening chronic HA, worse with changes in cabin pressure on airplanes, now awakening pt from sleep.  Neurological exam non-focal, cervical paraspinal tenderness w/ palpation, funduscopic exam without papilledema. \par \par Plan: \par -CT head w/o contrast \par -cervical spine x-ray \par -PT referral \par -Aleve, decongestants for flights PRN \par -RTC 2-3 months \par

## 2020-10-27 NOTE — PHYSICAL EXAM
[FreeTextEntry1] : A&Ox4, appears stated age, not in acute distress\par CN II-XII grossly intact b/l EOMI b/l CASPER VFF \par Fundoscopic exam w/o papilledema \par MS 5/5 in UE, LE b/l\par gross sensation to light touch intact B/L intact proprioception b/l \par decreased sensation to pinprick pain in distal LE b/l \par rhomberg negative \par HTS/FTN intact b/l \par Gait normal, narrow based, steady \par

## 2020-11-18 ENCOUNTER — APPOINTMENT (OUTPATIENT)
Dept: INTERNAL MEDICINE | Facility: CLINIC | Age: 70
End: 2020-11-18

## 2021-01-13 ENCOUNTER — APPOINTMENT (OUTPATIENT)
Dept: CARDIOLOGY | Facility: CLINIC | Age: 71
End: 2021-01-13

## 2021-05-17 ENCOUNTER — OUTPATIENT (OUTPATIENT)
Dept: OUTPATIENT SERVICES | Facility: HOSPITAL | Age: 71
LOS: 1 days | Discharge: HOME | End: 2021-05-17
Payer: MEDICARE

## 2021-05-17 ENCOUNTER — APPOINTMENT (OUTPATIENT)
Dept: INTERNAL MEDICINE | Facility: CLINIC | Age: 71
End: 2021-05-17
Payer: MEDICARE

## 2021-05-17 VITALS
HEART RATE: 94 BPM | OXYGEN SATURATION: 97 % | WEIGHT: 216 LBS | SYSTOLIC BLOOD PRESSURE: 130 MMHG | HEIGHT: 59 IN | DIASTOLIC BLOOD PRESSURE: 77 MMHG | TEMPERATURE: 98.3 F | BODY MASS INDEX: 43.55 KG/M2

## 2021-05-17 DIAGNOSIS — R07.89 OTHER CHEST PAIN: ICD-10-CM

## 2021-05-17 DIAGNOSIS — Z86.39 PERSONAL HISTORY OF OTHER ENDOCRINE, NUTRITIONAL AND METABOLIC DISEASE: ICD-10-CM

## 2021-05-17 DIAGNOSIS — R42 DIZZINESS AND GIDDINESS: ICD-10-CM

## 2021-05-17 PROCEDURE — 93010 ELECTROCARDIOGRAM REPORT: CPT

## 2021-05-17 PROCEDURE — 99214 OFFICE O/P EST MOD 30 MIN: CPT | Mod: GC

## 2021-05-17 RX ORDER — MECLIZINE HYDROCHLORIDE 25 MG/1
25 TABLET ORAL TWICE DAILY
Qty: 60 | Refills: 0 | Status: COMPLETED | COMMUNITY
Start: 2021-05-17 | End: 2021-06-16

## 2021-05-17 RX ORDER — PSEUDOEPHEDRINE HCL 30 MG
30 TABLET ORAL EVERY 4 HOURS
Qty: 20 | Refills: 0 | Status: DISCONTINUED | COMMUNITY
Start: 2020-10-08 | End: 2021-05-17

## 2021-05-17 RX ORDER — METFORMIN HYDROCHLORIDE 500 MG/1
500 TABLET, COATED ORAL
Qty: 60 | Refills: 2 | Status: DISCONTINUED | COMMUNITY
Start: 2020-09-17 | End: 2021-05-17

## 2021-05-17 NOTE — END OF VISIT
[] : Resident [FreeTextEntry3] : Pt. is a poor historian with vague complaints.  Pt. was last seen 9/17/20, and had moved down to [place] where pt. claimed she was evaluated by neuro, and had head CT done (-), and also had eye exam done to workup for her headache and "jerking movement" which I did not observe during her visit.  Pt. now returned to [place], here for follow up.  Pt. reports she has dizziness described as "spinning of the room", will give trial of meclizine.  Also reports that she feels "weakness" on her chest area.  CXR, EKG and 2D echo ordered.  RTC 6 months or prn with blood work prior to next visit.  Also refer pt. to [facility] for follow up glaucoma workup.

## 2021-05-17 NOTE — ASSESSMENT
[FreeTextEntry1] : 70 yr F with chronic back pain, DM, and DLD presents for follow up. \par \par #R tinnitus/facial pain with intermittent vertigo and dizziness\par - more assoc with body position changes but also with urination & BM\par - CTH done, neg report\par - never tried meclizine, was told she had 'vertigo' in past\par - Return to neurology \par - meclizine trial \par \par #Chest discomfort 'weakness' vs epigastric discomfort\par - EKG today\par - 2D echo\par - restart atorvastatin \par \par # Vitmain D deficiency\par - TSH WNL\par - restart vitamin OTC supplement\par - repeat blood work now\par \par #HLD:\par - cholesterol 257, , ASCVD 17.5\par - repeat labs\par - RESTART Lipitor 40mg at bedtime \par - pt educated on risk of stopping cholesterol med\par \par #DM\par -A1c is 7, 8/20/20\par - stop metformin given new hx of hypoglycemic episodes?\par - repeat blood work\par \par # HCM\par - pt declines mammo for cultural reasons\par - pt defers colonoscopy for later date\par - pap in 2015 neg as per pt\par - pt had PPSV in 2015, [id number] refused\par - Tdap last 1/2012.

## 2021-05-17 NOTE — PHYSICAL EXAM
[No Acute Distress] : no acute distress [Well Nourished] : well nourished [Well Developed] : well developed [Well-Appearing] : well-appearing [Normal Sclera/Conjunctiva] : normal sclera/conjunctiva [PERRL] : pupils equal round and reactive to light [EOMI] : extraocular movements intact [Normal Outer Ear/Nose] : the outer ears and nose were normal in appearance [Normal Oropharynx] : the oropharynx was normal [Normal TMs] : both tympanic membranes were normal [No JVD] : no jugular venous distention [No Lymphadenopathy] : no lymphadenopathy [Supple] : supple [No Respiratory Distress] : no respiratory distress  [No Accessory Muscle Use] : no accessory muscle use [Normal Rate] : normal rate  [Clear to Auscultation] : lungs were clear to auscultation bilaterally [Regular Rhythm] : with a regular rhythm [Normal S1, S2] : normal S1 and S2 [No Murmur] : no murmur heard [No Edema] : there was no peripheral edema [Soft] : abdomen soft [Non Tender] : non-tender [Non-distended] : non-distended [No HSM] : no HSM [Normal Posterior Cervical Nodes] : no posterior cervical lymphadenopathy [Normal Anterior Cervical Nodes] : no anterior cervical lymphadenopathy [No CVA Tenderness] : no CVA  tenderness [No Spinal Tenderness] : no spinal tenderness [No Joint Swelling] : no joint swelling [Coordination Grossly Intact] : coordination grossly intact [No Focal Deficits] : no focal deficits [Normal Gait] : normal gait [Normal Affect] : the affect was normal [Normal Insight/Judgement] : insight and judgment were intact [de-identified] : overweight AAF [de-identified] : during exam when I put pt in supine position felt 'abnormal' feeling in head then had full body jerks and had to sit up.

## 2021-05-17 NOTE — REVIEW OF SYSTEMS
[Fatigue] : fatigue [Vision Problems] : vision problems [Palpitations] : palpitations [Shortness Of Breath] : shortness of breath [Dizziness] : dizziness [Unsteady Walking] : ataxia [Fever] : no fever [Recent Change In Weight] : ~T no recent weight change [Pain] : no pain [Earache] : no earache [Chest Pain] : no chest pain [Lower Ext Edema] : no lower extremity edema [Wheezing] : no wheezing [Dyspnea on Exertion] : no dyspnea on exertion [Abdominal Pain] : no abdominal pain [Nausea] : no nausea [Constipation] : no constipation [Diarrhea] : diarrhea [Vomiting] : no vomiting [Dysuria] : no dysuria [Incontinence] : no incontinence [Hematuria] : no hematuria [Joint Pain] : no joint pain [Muscle Pain] : no muscle pain [Skin Rash] : no skin rash [FreeTextEntry3] : darkening with episodes [FreeTextEntry4] : tinnitus  [FreeTextEntry5] : unusual abnormal feeling in chest that she can't quantify  [FreeTextEntry6] : SOB with these episodes in chest [FreeTextEntry9] : muscle jerking  [de-identified] : tinnitus

## 2021-05-17 NOTE — HISTORY OF PRESENT ILLNESS
[FreeTextEntry1] : follow-up [de-identified] : 69y/o F with PMH of CBP, DM, HLD presents for follow-up. Last seen in Sept 2020 and at that time had no complaints. \akash Was seen in interim by neurology for headaches & muscle 'jerk' - they referred her for CTH and xray of cervical spine. \par The jerks was going on for an year - assoc with needing to make BM or urine. Has 1-2 jerks of arms alone when she has to go to bathroom. Essentially this strange sensation seems to be happening as precursor to urinating/BM. Mostly over R face & ear.\par \par Went to [place] - went to Dr there too and was told nothing was wrong on CT scans. Drank a lot of water and feels it went away.\par Now returned and now when goes to bathroom having visual darkening and mildly lightheaded/dizzy. Also feels lightheaded when she's sitting for a long time. Also has when she changes position. Has tinnitus and some throbbing in head. Has had vertigo in the past. \par \akash Also feels 'weakness' in chest area - described as abnormal feeling that she can't quite describe. Feels improved with food. Does check finger stick but doesn't go lower than 98 but when she gets to that she feels weak. So she eats. She feels she becomes hypoglycemic with metformin so she stopped taking it and all other medications. Also feels some pulling sensation in lower back. \par \[facility] IMAGING: CTH done 12/10/2020 negative. Blood work showed A1c 6.1, elevated cholesterol and low Vit D.

## 2021-05-18 LAB
ALBUMIN SERPL ELPH-MCNC: 4.3 G/DL
ALP BLD-CCNC: 82 U/L
ALT SERPL-CCNC: 13 U/L
ANION GAP SERPL CALC-SCNC: 13 MMOL/L
AST SERPL-CCNC: 16 U/L
BASOPHILS # BLD AUTO: 0.02 K/UL
BASOPHILS NFR BLD AUTO: 0.3 %
BILIRUB SERPL-MCNC: 0.4 MG/DL
BUN SERPL-MCNC: 15 MG/DL
CALCIUM SERPL-MCNC: 9.6 MG/DL
CHLORIDE SERPL-SCNC: 100 MMOL/L
CO2 SERPL-SCNC: 27 MMOL/L
CREAT SERPL-MCNC: 0.7 MG/DL
EOSINOPHIL # BLD AUTO: 0.29 K/UL
EOSINOPHIL NFR BLD AUTO: 4.4 %
ESTIMATED AVERAGE GLUCOSE: 140 MG/DL
GLUCOSE SERPL-MCNC: 104 MG/DL
HBA1C MFR BLD HPLC: 6.5 %
HCT VFR BLD CALC: 43.3 %
HGB BLD-MCNC: 13.7 G/DL
IMM GRANULOCYTES NFR BLD AUTO: 0.2 %
LYMPHOCYTES # BLD AUTO: 2.55 K/UL
LYMPHOCYTES NFR BLD AUTO: 38.8 %
MAN DIFF?: NORMAL
MCHC RBC-ENTMCNC: 28.8 PG
MCHC RBC-ENTMCNC: 31.6 G/DL
MCV RBC AUTO: 91 FL
MONOCYTES # BLD AUTO: 0.61 K/UL
MONOCYTES NFR BLD AUTO: 9.3 %
NEUTROPHILS # BLD AUTO: 3.1 K/UL
NEUTROPHILS NFR BLD AUTO: 47 %
PLATELET # BLD AUTO: 442 K/UL
POTASSIUM SERPL-SCNC: 4.1 MMOL/L
PROT SERPL-MCNC: 7.7 G/DL
RBC # BLD: 4.76 M/UL
RBC # FLD: 13.7 %
SODIUM SERPL-SCNC: 140 MMOL/L
WBC # FLD AUTO: 6.58 K/UL

## 2021-05-19 DIAGNOSIS — R51.9 HEADACHE, UNSPECIFIED: ICD-10-CM

## 2021-05-19 DIAGNOSIS — E11.9 TYPE 2 DIABETES MELLITUS WITHOUT COMPLICATIONS: ICD-10-CM

## 2021-05-19 DIAGNOSIS — R07.89 OTHER CHEST PAIN: ICD-10-CM

## 2021-05-19 DIAGNOSIS — E78.5 HYPERLIPIDEMIA, UNSPECIFIED: ICD-10-CM

## 2021-05-19 DIAGNOSIS — M54.5 LOW BACK PAIN: ICD-10-CM

## 2021-05-19 DIAGNOSIS — R42 DIZZINESS AND GIDDINESS: ICD-10-CM

## 2021-05-19 DIAGNOSIS — H93.13 TINNITUS, BILATERAL: ICD-10-CM

## 2021-05-19 LAB
25(OH)D3 SERPL-MCNC: 20 NG/ML
CREAT SPEC-SCNC: 127 MG/DL
HCV AB SER QL: NONREACTIVE
HCV S/CO RATIO: 0.09 S/CO
MICROALBUMIN 24H UR DL<=1MG/L-MCNC: <1.2 MG/DL
MICROALBUMIN/CREAT 24H UR-RTO: NORMAL MG/G
TSH SERPL-ACNC: 0.92 UIU/ML

## 2021-06-24 ENCOUNTER — NON-APPOINTMENT (OUTPATIENT)
Age: 71
End: 2021-06-24

## 2021-06-24 ENCOUNTER — APPOINTMENT (OUTPATIENT)
Dept: INTERNAL MEDICINE | Facility: CLINIC | Age: 71
End: 2021-06-24
Payer: MEDICARE

## 2021-06-24 ENCOUNTER — OUTPATIENT (OUTPATIENT)
Dept: OUTPATIENT SERVICES | Facility: HOSPITAL | Age: 71
LOS: 1 days | Discharge: HOME | End: 2021-06-24

## 2021-06-24 VITALS
TEMPERATURE: 96.8 F | DIASTOLIC BLOOD PRESSURE: 77 MMHG | SYSTOLIC BLOOD PRESSURE: 137 MMHG | OXYGEN SATURATION: 97 % | BODY MASS INDEX: 42.92 KG/M2 | HEART RATE: 92 BPM | WEIGHT: 212.9 LBS | HEIGHT: 59 IN

## 2021-06-24 DIAGNOSIS — E78.5 HYPERLIPIDEMIA, UNSPECIFIED: ICD-10-CM

## 2021-06-24 DIAGNOSIS — Z00.00 ENCOUNTER FOR GENERAL ADULT MEDICAL EXAMINATION W/OUT ABNORMAL FINDINGS: ICD-10-CM

## 2021-06-24 DIAGNOSIS — E55.9 VITAMIN D DEFICIENCY, UNSPECIFIED: ICD-10-CM

## 2021-06-24 DIAGNOSIS — Z00.00 ENCOUNTER FOR GENERAL ADULT MEDICAL EXAMINATION WITHOUT ABNORMAL FINDINGS: ICD-10-CM

## 2021-06-24 DIAGNOSIS — E11.9 TYPE 2 DIABETES MELLITUS WITHOUT COMPLICATIONS: ICD-10-CM

## 2021-06-24 LAB
CHOLEST SERPL-MCNC: 243 MG/DL
HDLC SERPL-MCNC: 69 MG/DL
LDLC SERPL CALC-MCNC: 158 MG/DL
NONHDLC SERPL-MCNC: 174 MG/DL
TRIGL SERPL-MCNC: 79 MG/DL

## 2021-06-24 PROCEDURE — 99213 OFFICE O/P EST LOW 20 MIN: CPT | Mod: GC

## 2021-06-24 NOTE — HISTORY OF PRESENT ILLNESS
[FreeTextEntry1] : follow up visit.  [de-identified] : 69y/o F with PMH of CBP, DM, HLD presents for follow-up. Follow up visit. \par Pt still; complains if muscle jerks and experiencing lightning bolt sensation in all over body. Was seen by neurology for headaches & muscle 'jerk' - they referred her for CTH and xray of cervical spine. The jerks was going on for an year - assoc with needing to make BM or urine. Has 1-2 jerks of arms alone when she has to go to bathroom. Essentially this strange sensation seems to be happening as precursor to urinating/BM. Mostly over R face & ear.\par Went to [place] - went to Dr there too and was told nothing was wrong on CT scans. Drank a lot of water and feels it went away.\par Now returned and now when goes to bathroom having visual darkening and mildly lightheaded/dizzy. Also feels lightheaded when she's sitting for a long time. Also has when she changes position. Has tinnitus and some throbbing in head. Has had vertigo in the past. \par \par Also feels 'weakness' in chest area - described as abnormal feeling that she can't quite describe. Feels improved with food. Does check finger stick but doesn't go lower than 98 but when she gets to that she feels weak. So she eats. She feels she becomes hypoglycemic with metformin so she stopped taking it and all other medications. Also feels some pulling sensation in lower back. \par \[facility] IMAGING: CTH done 12/10/2020 negative. Blood work showed A1c 6.1, elevated cholesterol and low Vit D.

## 2021-06-24 NOTE — ASSESSMENT
[FreeTextEntry1] : 70 yr F with chronic back pain, DM, and DLD presents for follow up. \par \par #R tinnitus/facial pain with intermittent vertigo and dizziness\par - more assoc with body position changes but also with urination & BM\par - CTH done, neg report\par - never tried meclizine, was told she had 'vertigo' in past\par - Return to neurology \par - meclizine trial \par \par #Chest discomfort 'weakness' vs epigastric discomfort\par - restart atorvastatin \par - EKG WNL\par \par # Vitmain D deficiency\par - TSH WNL\par - restart vitamin OTC supplement\par - repeat blood work now\par \par #HLD:\par - cholesterol 257, , ASCVD 17.5\par - repeat labs\par - RESTART Lipitor 40mg at bedtime \par - pt educated on risk of stopping cholesterol med\par \par #DM\par - A1c is 6.5, \par - stop metformin given new hx of hypoglycemic episodes?\par - repeat blood work\par \par # HCM\par - pt declines mammo for cultural reasons\par - pt defers colonoscopy for later date\par - pap in 2015 neg as per pt\par - pt had PPSV in 2015, [email or web address] refused\par - Tdap last 1/2012.

## 2021-06-24 NOTE — ED ADULT NURSE NOTE - NS ED NURSE DC INFO COMPLEXITY
No
Patient asked questions/Returned Demonstration/Moderate: Comprehensive teaching/Verbalized Understanding

## 2021-06-24 NOTE — END OF VISIT
[] : Resident [FreeTextEntry3] : Pt. here for follow up blood work.  Blood work 5/19/21 showed 25-OH Vitamin D 20, , triglyceride 79 (has not take atorvastatin since moved to [place]), 10 years ASCVD 31.48%, A1C 6.5.  Pt. reports she has been taking her atorvastatin since her last visit.  Advised pt. to continue current medication.  Complete 2D echo, CXR and scheduled neuro and ophthal. appointment.  RTC in 6 months as scheduled

## 2021-07-12 ENCOUNTER — LABORATORY RESULT (OUTPATIENT)
Age: 71
End: 2021-07-12

## 2021-07-12 ENCOUNTER — OUTPATIENT (OUTPATIENT)
Dept: OUTPATIENT SERVICES | Facility: HOSPITAL | Age: 71
LOS: 1 days | Discharge: HOME | End: 2021-07-12

## 2021-07-12 DIAGNOSIS — Z11.59 ENCOUNTER FOR SCREENING FOR OTHER VIRAL DISEASES: ICD-10-CM

## 2021-07-14 ENCOUNTER — FORM ENCOUNTER (OUTPATIENT)
Age: 71
End: 2021-07-14

## 2021-07-15 ENCOUNTER — OUTPATIENT (OUTPATIENT)
Dept: OUTPATIENT SERVICES | Facility: HOSPITAL | Age: 71
LOS: 1 days | Discharge: HOME | End: 2021-07-15
Payer: MEDICARE

## 2021-07-15 DIAGNOSIS — R07.89 OTHER CHEST PAIN: ICD-10-CM

## 2021-07-15 PROCEDURE — 93306 TTE W/DOPPLER COMPLETE: CPT | Mod: 26

## 2021-09-07 NOTE — ED CDU PROVIDER INITIAL DAY NOTE - NEURO NEGATIVE STATEMENT, MLM
· Continue lisinopril 10 mg daily  · Continue metoprolol tartrate 50 mg twice daily   no loss of consciousness, no gait abnormality, no headache, no sensory deficits, and no weakness.

## 2021-12-28 ENCOUNTER — OUTPATIENT (OUTPATIENT)
Dept: OUTPATIENT SERVICES | Facility: HOSPITAL | Age: 71
LOS: 1 days | Discharge: HOME | End: 2021-12-28

## 2021-12-28 ENCOUNTER — APPOINTMENT (OUTPATIENT)
Dept: NEUROLOGY | Facility: CLINIC | Age: 71
End: 2021-12-28
Payer: MEDICARE

## 2021-12-28 ENCOUNTER — NON-APPOINTMENT (OUTPATIENT)
Age: 71
End: 2021-12-28

## 2021-12-28 VITALS
DIASTOLIC BLOOD PRESSURE: 81 MMHG | OXYGEN SATURATION: 96 % | HEART RATE: 102 BPM | HEIGHT: 59 IN | WEIGHT: 211 LBS | SYSTOLIC BLOOD PRESSURE: 127 MMHG | BODY MASS INDEX: 42.54 KG/M2

## 2021-12-28 PROCEDURE — 99212 OFFICE O/P EST SF 10 MIN: CPT | Mod: GC

## 2021-12-29 NOTE — HISTORY OF PRESENT ILLNESS
[FreeTextEntry1] : 71F w/ pmh DM, DLD, chronic back pain, tinnitus, vit D deficiency presenting for f/u of HA and neck pain. Pt has been having HA last 5 years, and describes it as sharp, radiating from back of the head, throughout the scalp and radiates to the right face and associate with dental pain, states pain worsened by airplane rides. Pt reports HA are present when flying, severe enough to the point her head feels like its going to "explode" however not at home at rest. \par \par Pt presents today c/o BL neck pain, intermittent vertgio, BL tinitis and a "weird" sensation on her head and unable to describe further. \par \par On last encounter, recommended pt get CTH and XR of C spine however not done. Will get MR head and C spine and f/u 3mo.

## 2021-12-29 NOTE — PHYSICAL EXAM
[General Appearance - Alert] : alert [General Appearance - In No Acute Distress] : in no acute distress [Oriented To Time, Place, And Person] : oriented to person, place, and time [Impaired Insight] : insight and judgment were intact [Affect] : the affect was normal [Person] : oriented to person [Place] : oriented to place [Time] : oriented to time [Concentration Intact] : normal concentrating ability [Visual Intact] : visual attention was ~T not ~L decreased [Naming Objects] : no difficulty naming common objects [Repeating Phrases] : no difficulty repeating a phrase [Writing A Sentence] : no difficulty writing a sentence [Fluency] : fluency intact [Comprehension] : comprehension intact [Reading] : reading intact [Past History] : adequate knowledge of personal past history [Cranial Nerves Oculomotor (III)] : extraocular motion intact [Cranial Nerves Optic (II)] : visual acuity intact bilaterally,  visual fields full to confrontation, pupils equal round and reactive to light [Cranial Nerves Trigeminal (V)] : facial sensation intact symmetrically [Cranial Nerves Facial (VII)] : face symmetrical [Cranial Nerves Vestibulocochlear (VIII)] : hearing was intact bilaterally [Cranial Nerves Glossopharyngeal (IX)] : tongue and palate midline [Cranial Nerves Accessory (XI - Cranial And Spinal)] : head turning and shoulder shrug symmetric [Cranial Nerves Hypoglossal (XII)] : there was no tongue deviation with protrusion [Motor Strength] : muscle strength was normal in all four extremities [No Muscle Atrophy] : normal bulk in all four extremities [Sensation Tactile Decrease] : light touch was intact [Abnormal Walk] : normal gait [Balance] : balance was intact [2+] : Ankle jerk left 2+ [Outer Ear] : the ears and nose were normal in appearance [Oropharynx] : the oropharynx was normal [Auscultation Breath Sounds / Voice Sounds] : lungs were clear to auscultation bilaterally [Heart Rate And Rhythm] : heart rate was normal and rhythm regular [Heart Sounds] : normal S1 and S2 [Full Pulse] : the pedal pulses are present [Edema] : there was no peripheral edema [Bowel Sounds] : normal bowel sounds [Abdomen Soft] : soft [Abdomen Tenderness] : non-tender [No CVA Tenderness] : no ~M costovertebral angle tenderness [No Spinal Tenderness] : no spinal tenderness [Skin Color & Pigmentation] : normal skin color and pigmentation [Skin Turgor] : normal skin turgor [] : no rash [Motor Tone] : muscle strength and tone were normal [Past-pointing] : there was no past-pointing [Tremor] : no tremor present [Plantar Reflex Right Only] : normal on the right [Plantar Reflex Left Only] : normal on the left [FreeTextEntry1] : Pain with neck pROM

## 2021-12-29 NOTE — ASSESSMENT
[FreeTextEntry1] : 71F w/ pmh DM, DLD, chronic back pain, tinnitus, vit D deficiency presenting w/ c/o worsening chronic HA, worse with changes in cabin pressure on airplanes. HA only present when flying however at home pt c/o intermittent vertigo as well as neck pain and low back pain. Pt also c/o BL tinnitus. Reports seeing ENT in past with no resoln of sx. \par \par #Cervicalgia with radiculopathy \par #Low back pain \par - MR brain and c spine without contrast \par - PT referral for low back and neck pain \par - Tizanidine 2mg qhs PRN \par - gabapentin 100mg TID \par - RTC in 3mo \par \par #Tinnitus \par #Vertigo \par #Severe HA with flying \par - Counseled on use of Benadryl and decongestants to help with HA with flying \par - PT referral for vestibular rehab \par - Meclizine PRN \par - RTC in 3mo

## 2022-02-03 ENCOUNTER — APPOINTMENT (OUTPATIENT)
Dept: INTERNAL MEDICINE | Facility: CLINIC | Age: 72
End: 2022-02-03

## 2022-02-06 ENCOUNTER — INPATIENT (INPATIENT)
Facility: HOSPITAL | Age: 72
LOS: 4 days | Discharge: HOME HEALTH | End: 2022-02-11
Attending: HOSPITALIST | Admitting: HOSPITALIST
Payer: MEDICARE

## 2022-02-06 VITALS
SYSTOLIC BLOOD PRESSURE: 144 MMHG | RESPIRATION RATE: 16 BRPM | OXYGEN SATURATION: 97 % | DIASTOLIC BLOOD PRESSURE: 64 MMHG | HEART RATE: 105 BPM | TEMPERATURE: 98 F | WEIGHT: 214.95 LBS

## 2022-02-06 LAB
ALBUMIN SERPL ELPH-MCNC: 4.2 G/DL — SIGNIFICANT CHANGE UP (ref 3.5–5.2)
ALP SERPL-CCNC: 102 U/L — SIGNIFICANT CHANGE UP (ref 30–115)
ALT FLD-CCNC: 46 U/L — HIGH (ref 0–41)
ANION GAP SERPL CALC-SCNC: 13 MMOL/L — SIGNIFICANT CHANGE UP (ref 7–14)
APPEARANCE UR: ABNORMAL
APTT BLD: 33.8 SEC — SIGNIFICANT CHANGE UP (ref 27–39.2)
APTT BLD: >200 SEC — SIGNIFICANT CHANGE UP (ref 27–39.2)
AST SERPL-CCNC: 40 U/L — SIGNIFICANT CHANGE UP (ref 0–41)
B-OH-BUTYR SERPL-SCNC: <0.2 MMOL/L — SIGNIFICANT CHANGE UP
BACTERIA # UR AUTO: NEGATIVE — SIGNIFICANT CHANGE UP
BASE EXCESS BLDV CALC-SCNC: 1.6 MMOL/L — SIGNIFICANT CHANGE UP (ref -2–3)
BASOPHILS # BLD AUTO: 0.03 K/UL — SIGNIFICANT CHANGE UP (ref 0–0.2)
BASOPHILS NFR BLD AUTO: 0.3 % — SIGNIFICANT CHANGE UP (ref 0–1)
BILIRUB SERPL-MCNC: 0.6 MG/DL — SIGNIFICANT CHANGE UP (ref 0.2–1.2)
BILIRUB UR-MCNC: NEGATIVE — SIGNIFICANT CHANGE UP
BUN SERPL-MCNC: 14 MG/DL — SIGNIFICANT CHANGE UP (ref 10–20)
CA-I SERPL-SCNC: 1.25 MMOL/L — SIGNIFICANT CHANGE UP (ref 1.15–1.33)
CALCIUM SERPL-MCNC: 9.5 MG/DL — SIGNIFICANT CHANGE UP (ref 8.5–10.1)
CHLORIDE SERPL-SCNC: 100 MMOL/L — SIGNIFICANT CHANGE UP (ref 98–110)
CO2 SERPL-SCNC: 23 MMOL/L — SIGNIFICANT CHANGE UP (ref 17–32)
COLOR SPEC: YELLOW — SIGNIFICANT CHANGE UP
CREAT SERPL-MCNC: 0.7 MG/DL — SIGNIFICANT CHANGE UP (ref 0.7–1.5)
DIFF PNL FLD: SIGNIFICANT CHANGE UP
EOSINOPHIL # BLD AUTO: 0.02 K/UL — SIGNIFICANT CHANGE UP (ref 0–0.7)
EOSINOPHIL NFR BLD AUTO: 0.2 % — SIGNIFICANT CHANGE UP (ref 0–8)
EPI CELLS # UR: 2 /HPF — SIGNIFICANT CHANGE UP (ref 0–5)
GAS PNL BLDV: 136 MMOL/L — SIGNIFICANT CHANGE UP (ref 136–145)
GAS PNL BLDV: SIGNIFICANT CHANGE UP
GLUCOSE SERPL-MCNC: 132 MG/DL — HIGH (ref 70–99)
GLUCOSE UR QL: NEGATIVE — SIGNIFICANT CHANGE UP
HCO3 BLDV-SCNC: 28 MMOL/L — SIGNIFICANT CHANGE UP (ref 22–29)
HCT VFR BLD CALC: 45.6 % — SIGNIFICANT CHANGE UP (ref 37–47)
HCT VFR BLDA CALC: 44 % — SIGNIFICANT CHANGE UP (ref 34.5–46.5)
HGB BLD CALC-MCNC: 14.5 G/DL — SIGNIFICANT CHANGE UP (ref 11.7–16.1)
HGB BLD-MCNC: 14.7 G/DL — SIGNIFICANT CHANGE UP (ref 12–16)
HYALINE CASTS # UR AUTO: 0 /LPF — SIGNIFICANT CHANGE UP (ref 0–7)
IMM GRANULOCYTES NFR BLD AUTO: 0.3 % — SIGNIFICANT CHANGE UP (ref 0.1–0.3)
INR BLD: 1.18 RATIO — SIGNIFICANT CHANGE UP (ref 0.65–1.3)
KETONES UR-MCNC: SIGNIFICANT CHANGE UP
LACTATE BLDV-MCNC: 1.6 MMOL/L — SIGNIFICANT CHANGE UP (ref 0.5–2)
LACTATE SERPL-SCNC: 1.7 MMOL/L — SIGNIFICANT CHANGE UP (ref 0.7–2)
LEUKOCYTE ESTERASE UR-ACNC: NEGATIVE — SIGNIFICANT CHANGE UP
LIDOCAIN IGE QN: 23 U/L — SIGNIFICANT CHANGE UP (ref 7–60)
LYMPHOCYTES # BLD AUTO: 0.87 K/UL — LOW (ref 1.2–3.4)
LYMPHOCYTES # BLD AUTO: 9.6 % — LOW (ref 20.5–51.1)
MCHC RBC-ENTMCNC: 28.9 PG — SIGNIFICANT CHANGE UP (ref 27–31)
MCHC RBC-ENTMCNC: 32.2 G/DL — SIGNIFICANT CHANGE UP (ref 32–37)
MCV RBC AUTO: 89.6 FL — SIGNIFICANT CHANGE UP (ref 81–99)
MONOCYTES # BLD AUTO: 0.6 K/UL — SIGNIFICANT CHANGE UP (ref 0.1–0.6)
MONOCYTES NFR BLD AUTO: 6.6 % — SIGNIFICANT CHANGE UP (ref 1.7–9.3)
NEUTROPHILS # BLD AUTO: 7.5 K/UL — HIGH (ref 1.4–6.5)
NEUTROPHILS NFR BLD AUTO: 83 % — HIGH (ref 42.2–75.2)
NITRITE UR-MCNC: NEGATIVE — SIGNIFICANT CHANGE UP
NRBC # BLD: 0 /100 WBCS — SIGNIFICANT CHANGE UP (ref 0–0)
PCO2 BLDV: 51 MMHG — HIGH (ref 39–42)
PH BLDV: 7.35 — SIGNIFICANT CHANGE UP (ref 7.32–7.43)
PH UR: 6 — SIGNIFICANT CHANGE UP (ref 5–8)
PLATELET # BLD AUTO: 276 K/UL — SIGNIFICANT CHANGE UP (ref 130–400)
PO2 BLDV: 30 MMHG — SIGNIFICANT CHANGE UP
POTASSIUM BLDV-SCNC: 4.1 MMOL/L — SIGNIFICANT CHANGE UP (ref 3.5–5.1)
POTASSIUM SERPL-MCNC: 4 MMOL/L — SIGNIFICANT CHANGE UP (ref 3.5–5)
POTASSIUM SERPL-SCNC: 4 MMOL/L — SIGNIFICANT CHANGE UP (ref 3.5–5)
PROT SERPL-MCNC: 7.7 G/DL — SIGNIFICANT CHANGE UP (ref 6–8)
PROT UR-MCNC: SIGNIFICANT CHANGE UP
PROTHROM AB SERPL-ACNC: 13.6 SEC — HIGH (ref 9.95–12.87)
RBC # BLD: 5.09 M/UL — SIGNIFICANT CHANGE UP (ref 4.2–5.4)
RBC # FLD: 13.4 % — SIGNIFICANT CHANGE UP (ref 11.5–14.5)
RBC CASTS # UR COMP ASSIST: 9 /HPF — HIGH (ref 0–4)
SAO2 % BLDV: 52.9 % — SIGNIFICANT CHANGE UP
SODIUM SERPL-SCNC: 136 MMOL/L — SIGNIFICANT CHANGE UP (ref 135–146)
SP GR SPEC: 1.01 — SIGNIFICANT CHANGE UP (ref 1.01–1.03)
UROBILINOGEN FLD QL: SIGNIFICANT CHANGE UP
WBC # BLD: 9.05 K/UL — SIGNIFICANT CHANGE UP (ref 4.8–10.8)
WBC # FLD AUTO: 9.05 K/UL — SIGNIFICANT CHANGE UP (ref 4.8–10.8)
WBC UR QL: 1 /HPF — SIGNIFICANT CHANGE UP (ref 0–5)

## 2022-02-06 PROCEDURE — 99223 1ST HOSP IP/OBS HIGH 75: CPT

## 2022-02-06 PROCEDURE — 74176 CT ABD & PELVIS W/O CONTRAST: CPT | Mod: 26,MA

## 2022-02-06 PROCEDURE — 93010 ELECTROCARDIOGRAM REPORT: CPT

## 2022-02-06 PROCEDURE — 93970 EXTREMITY STUDY: CPT | Mod: 26

## 2022-02-06 PROCEDURE — 99285 EMERGENCY DEPT VISIT HI MDM: CPT

## 2022-02-06 RX ORDER — CHLORHEXIDINE GLUCONATE 213 G/1000ML
1 SOLUTION TOPICAL
Refills: 0 | Status: DISCONTINUED | OUTPATIENT
Start: 2022-02-06 | End: 2022-02-08

## 2022-02-06 RX ORDER — HEPARIN SODIUM 5000 [USP'U]/ML
8000 INJECTION INTRAVENOUS; SUBCUTANEOUS ONCE
Refills: 0 | Status: COMPLETED | OUTPATIENT
Start: 2022-02-06 | End: 2022-02-06

## 2022-02-06 RX ORDER — ONDANSETRON 8 MG/1
4 TABLET, FILM COATED ORAL ONCE
Refills: 0 | Status: COMPLETED | OUTPATIENT
Start: 2022-02-06 | End: 2022-02-06

## 2022-02-06 RX ORDER — ACETAMINOPHEN 500 MG
650 TABLET ORAL ONCE
Refills: 0 | Status: COMPLETED | OUTPATIENT
Start: 2022-02-06 | End: 2022-02-06

## 2022-02-06 RX ORDER — HEPARIN SODIUM 5000 [USP'U]/ML
INJECTION INTRAVENOUS; SUBCUTANEOUS
Qty: 25000 | Refills: 0 | Status: DISCONTINUED | OUTPATIENT
Start: 2022-02-06 | End: 2022-02-06

## 2022-02-06 RX ORDER — SODIUM CHLORIDE 9 MG/ML
1000 INJECTION, SOLUTION INTRAVENOUS ONCE
Refills: 0 | Status: COMPLETED | OUTPATIENT
Start: 2022-02-06 | End: 2022-02-06

## 2022-02-06 RX ORDER — OXYCODONE AND ACETAMINOPHEN 5; 325 MG/1; MG/1
1 TABLET ORAL EVERY 6 HOURS
Refills: 0 | Status: DISCONTINUED | OUTPATIENT
Start: 2022-02-06 | End: 2022-02-08

## 2022-02-06 RX ORDER — HEPARIN SODIUM 5000 [USP'U]/ML
1500 INJECTION INTRAVENOUS; SUBCUTANEOUS
Qty: 25000 | Refills: 0 | Status: DISCONTINUED | OUTPATIENT
Start: 2022-02-07 | End: 2022-02-07

## 2022-02-06 RX ORDER — ACETAMINOPHEN 500 MG
650 TABLET ORAL EVERY 6 HOURS
Refills: 0 | Status: DISCONTINUED | OUTPATIENT
Start: 2022-02-06 | End: 2022-02-08

## 2022-02-06 RX ORDER — ENOXAPARIN SODIUM 100 MG/ML
100 INJECTION SUBCUTANEOUS ONCE
Refills: 0 | Status: DISCONTINUED | OUTPATIENT
Start: 2022-02-06 | End: 2022-02-06

## 2022-02-06 RX ADMIN — HEPARIN SODIUM 1800 UNIT(S)/HR: 5000 INJECTION INTRAVENOUS; SUBCUTANEOUS at 18:04

## 2022-02-06 RX ADMIN — Medication 650 MILLIGRAM(S): at 14:17

## 2022-02-06 RX ADMIN — Medication 650 MILLIGRAM(S): at 15:41

## 2022-02-06 RX ADMIN — SODIUM CHLORIDE 1000 MILLILITER(S): 9 INJECTION, SOLUTION INTRAVENOUS at 15:41

## 2022-02-06 RX ADMIN — ONDANSETRON 4 MILLIGRAM(S): 8 TABLET, FILM COATED ORAL at 14:17

## 2022-02-06 RX ADMIN — SODIUM CHLORIDE 1000 MILLILITER(S): 9 INJECTION, SOLUTION INTRAVENOUS at 14:18

## 2022-02-06 RX ADMIN — HEPARIN SODIUM 8000 UNIT(S): 5000 INJECTION INTRAVENOUS; SUBCUTANEOUS at 17:53

## 2022-02-06 NOTE — H&P ADULT - NSHPPHYSICALEXAM_GEN_ALL_CORE
PHYSICAL EXAM:  GENERAL: NAD, lying in bed comfortably  HEAD:  Atraumatic, Normocephalic  EYES: EOMI, PERRLA, conjunctiva and sclera clear  ENT: Moist mucous membranes  NECK: Supple, No JVD  CHEST/LUNG: Clear to auscultation bilaterally; No rales, rhonchi, wheezing, or rubs. Unlabored respirations  HEART: Regular rate and rhythm; No murmurs, rubs, or gallops  ABDOMEN: Bowel sounds present; Soft, Nontender, Nondistended. No hepatomegally  EXTREMITIES:  2+ Peripheral Pulses, brisk capillary refill. No clubbing, cyanosis, or edema  NERVOUS SYSTEM:  Alert & Oriented X3, speech clear. No deficits   MSK: FROM all 4 extremities, full and equal strength  SKIN: No rashes or lesions PHYSICAL EXAM:  GENERAL: NAD, lying in bed comfortably  HEAD:  Atraumatic, Normocephalic  EYES: EOMI, PERRLA, conjunctiva and sclera clear  ENT: Moist mucous membranes  NECK: Supple, No JVD  CHEST/LUNG: Clear to auscultation bilaterally; No rales, rhonchi, wheezing, or rubs. Unlabored respirations  HEART: Regular rate and rhythm; No murmurs, rubs, or gallops  ABDOMEN: pelvic region tense and tender to mild palpations  EXTREMITIES:  2+ Peripheral Pulses, brisk capillary refill. No clubbing, cyanosis, or edema  NERVOUS SYSTEM:  Alert & Oriented X3, speech clear. No deficits   MSK: Tenderness to palpation to LL back and L buttock only. L calf tenderness   SKIN: No rashes or lesions

## 2022-02-06 NOTE — ED PROVIDER NOTE - ATTENDING CONTRIBUTION TO CARE
72 y/o f w/ pmhx of DM, HTN, GERD, anxiety, noncompliant with medications as she reports she reads about the side effects on the Internet and does not want to take the meds, presents with left lower extremity swelling and pain extending from L hip to foot, worse to L calf, since yesterday associated with swelling, pain is throbbing constant nonradiating worse with movement better at rest.  Patient also reports nausea associated with lower abdominal pain worse to left lower quadrant, sharp nonradiating, constant, no alleviating or precipitating factors, moderate in intensity radiating to lower back. No fever, chills, v, cp,  pleuritic cp, sob, palpitations, diaphoresis, cough, ha/lh/dizziness, numbness/tingling, neck pain/ stiffness, diarrhea, constipation, melena/brbpr, urinary symptoms, trauma, weakness,  sick contacts, recent travel or rash.    Vital Signs: I have reviewed the initial vital signs. Constitutional: Non toxic appearing pt sitting on stretcher speaking full sentences. Integumentary: No rash. ENT: MMM NECK: Supple, non-tender, no meningeal signs. Cardiovascular: RRR, dp and pt pulses 2/4 b/l. radial pulses 2/4 b/l. No JVD. Respiratory: BS present b/l, ctabl, no wheezing or crackles, no accessory muscle use, no stridor. Gastrointestinal: BS present throughout all 4 quadrants, soft, nd, LLQ abd pain to palpation, no rebound tenderness or guarding, no cvat. Musculoskeletal: FROM, LLLE > RLE in circumference, pain to L calf with swelling noted, mild warmth to palpation. no erythema  or streaking, no crepitus, induration, fluctuance, no discharge, no signs of trauma, no abscess, (+) soft compartments. No spinous ttp, no palpable shelves or step offs.  Neurologic: AAOx3, motor 5/5 and sensation intact throughout upper and lowe ext, CN II-XII intact, No facial droop or slurring of speech. No focal deficits.

## 2022-02-06 NOTE — H&P ADULT - ATTENDING COMMENTS
70 YO F with a PMH of Vertigo, DLD, and DM2 who presents to the hospital with a c/o left calf pain for the past x 2 days. Worse w/ standing. Denies any trauma/falls. Denies any CP, SOB, palpitations, or ABD pain. ROS is negative except as above.     In the ED, LE doppler positive for left calf DVT (pending official read). CT-AP w/o contrast (pt refused contrast) showed uterine fibroids and inflammatory changes around left iliac vessels, limited due to exam type. Vascular consulted and asked for heparin drip, will consider thrombectomy later in the week.     FMHx: Reviewed, not relevant    Physical exam shows pt in NAD. VSS, afebrile, not hypoxic on RA. A&Ox3. Neuro exam without deficits, motor/sensory intact, no dysarthria, no facial asymmetry. Muscle strength/sensation intact. CTA B/L with no W/C/R. RRR, no M/G/R. ABD is soft and non-tender, normoactive BSs. LEs without swelling, + left calf pain to palpation. No rashes. Labs and radiology as above.     Left calf pain due to DVT. Vascular is following. Heparin drip started in the ED. Serial Coags. FU official doppler results    Calcified myometrium. Agree w/ OBGYN eval.     Hx of Vertigo, DLD, and DM2. Restart home meds, except as stated above. DVT PPX. Inform PCP of pt's admission to hospital. My note supersedes the residents note.     Date seen by [email or web address]

## 2022-02-06 NOTE — H&P ADULT - ASSESSMENT
70 yo F with PMHx of Vertigo, DLD, and DM noncompliant with medications because she reads articles about their side effects and does not want to take them presents to the ED for evaluation of left calf pain radiating up to the LLQ x 2 days found to have L calf DVT.     # L calf DVT     # Calcified leiomyomas     # DM    # DLD  - HA1c ___% in __  - Monitor FS  - Start basal bolus if FS persistently >180  - F/u A1c     # Vertigo  - C/w     DVT ppx: hep gtt  GI ppx: None  Diet: CC   Activity: IAT  Dispo: Acute    72 yo F with PMHx of Vertigo, DLD, and DM noncompliant with medications because she reads articles about their side effects and does not want to take them presents to the ED for evaluation of left calf pain radiating up to the LLQ x 2 days found to have L calf DVT.     # L calf DVT   - B/l LE duplex c/w L dvt  - Hep gtt with PTT ATC   - Monitor for signs of bleed  - F/u vasculare c/s for possible thrombectomy     # Calcified enlarged myometrium   # Bulky leiomyomas  # LLQ abd pain   - CT A/P noncont: Mild R hydro extending to bulky uterine fibroids. Enlarged myometrial uterus, several which are calcified. Inflammatory changes surrounding the left iliac vessels, limited evaluation without intravenous contrast. Consider repeat CT with intravenous contrast as clinically warranted  - Pt refuses contrast   - pain control with tylenol, percocet   - OB eval     # DLD   - C/w   - F/u lipid profile     # DM  - Monitor FS  - Start basal bolus if FS persistently >180  - F/u A1c     # Vertigo  - C/w     DVT ppx: hep gtt  GI ppx: None  Diet: CC   Activity: IAT  Dispo: Acute    72 yo F with PMHx of Vertigo, DLD, and DM noncompliant with medications because she reads articles about their side effects and does not want to take them presents to the ED for evaluation of left calf pain radiating up to the LLQ x 2 days found to have L calf DVT.     # L calf DVT   - B/l LE duplex c/w L dvt  - Hep gtt with PTT ATC (goal 60-80)  - Monitor for signs of bleed  - Pain control   - F/u vasculare c/s for possible thrombectomy   - PT eval     # Chronic back pain  - Pain control  - PT eval  - Continue w/u with Dr Lang as OP    # Calcified enlarged myometrium   # Bulky leiomyomas  # LLQ abd pain   - Physical with tense and tender pelvic area  - CT A/P noncont: Mild R hydro extending to bulky uterine fibroids. Enlarged myometrial uterus, several which are calcified. Inflammatory changes surrounding the left iliac vessels, limited evaluation without intravenous contrast. Consider repeat CT with intravenous contrast as clinically warranted  - Pt refuses contrast   - pain control with tylenol, percocet   - OB eval     # DLD   - Off meds, will order Atorvastatin 20mg po Qd  - F/u lipid profile     # DM  - Monitor FS  - Start basal bolus if FS persistently >180  - F/u A1c     # Vertigo  - Was rx Meclizine unsure of dose  - Start Meclizine 25mg PO PRN     DVT ppx: hep gtt  GI ppx: None  Diet: CC   Activity: IAT  Dispo: Acute

## 2022-02-06 NOTE — ED ADULT NURSE NOTE - ED STAT RN HANDOFF DETAILS
patient in stable condition, IV heparin infusing with no adverse reaction, endorsed to BART noriega.

## 2022-02-06 NOTE — ED PROVIDER NOTE - PHYSICAL EXAMINATION
Physical Exam    Vital Signs: I have reviewed the initial vital signs.  Constitutional: pt is overweight, appears stated age, no acute distress  Eyes: Conjunctiva pink, Sclera clear  Cardiovascular: S1 and S2, regular rate, regular rhythm, well-perfused extremities, radial and pedal pulses equal and 2+ b/l.   Respiratory: unlabored respiratory effort, clear to auscultation bilaterally no wheezing, rales and rhonchi. pt is speaking full sentences. no accessory muscle use.   Gastrointestinal: soft, (+) mild llq tenderness, nondistended abdomen, no pulsatile mass, normal bowl sounds, no rebound, no guarding, no cva tenderness  Musculoskeletal: supple neck, (+) mild left lower extremity edema, (+) left calf tenderness, pt left lower extremity skin is more firm and tight than the right, no midline tenderness, no palpable spinal step offs  Integumentary: warm, dry, no rash  Neurologic: awake, alert, cranial nerves II-XII grossly intact, extremities’ motor and sensory functions grossly intact.  Psychiatric: appropriate mood, appropriate affect

## 2022-02-06 NOTE — ED PROVIDER NOTE - PROGRESS NOTE DETAILS
FF: pt reports she has only had iv contrast once and "went out of her mind" she has never got it again since. pt reports she could not tolerate it. pt reports she even had mri recently and did it with out contrast. pt reports no rash, vomiting, nausea, abdominal pain, chest pain, sob, drooling, or throat closing with contrast. although its not a true allergy pt is refusing ct with contrast although I discussed the limitations of doing the ct without contrast vs with contrast. FF: spoke with vascular dr. falk, recommends starting heparin drip with bolus, admit to medicine, possible thrombectomy by the end of the weekend will further discuss with attending. ED Attending ALVARO Garg  Pt aware of results, understands plan for admission, medical admitting team aware of pt and admission, vascular following.

## 2022-02-06 NOTE — ED PROVIDER NOTE - OBJECTIVE STATEMENT
70 y/o female with a PMH of vertigo, HLD, and DM noncompliant with medications because she read articles about their side effects and does not want to take them presents to the ED for evaluation of left calf pain radiating up to the left lower abdomen x 2 days. pt reports pain is worse with weight bearing. pt report she is active and walks to the store to get her own groceries. pt denies fever, chills, chest pain, sob, back pain, abdominal pain, n/v/d/c, urinary symptoms, hx of blood clots, recent travel, recent trauma, recent surgeries, hx of cancer, use of hormones, immobility, numbness, tingling, or weakness.

## 2022-02-06 NOTE — ED PROVIDER NOTE - CARE PLAN
Assessment and plan of treatment:	Plan: Labs, ivf, pain control, urine, imaging, reassess.   Principal Discharge DX:	Left leg DVT  Assessment and plan of treatment:	Plan: Labs, ivf, pain control, urine, imaging, reassess.   1

## 2022-02-06 NOTE — H&P ADULT - HISTORY OF PRESENT ILLNESS
72 yo F with PMHx of Vertigo, DLD, and DM noncompliant with medications because she reads articles about their side effects and does not want to take them presents to the ED for evaluation of left calf pain radiating up to the LLQ x 2 days.     pt reports pain is worse with weight bearing. pt report she is active and walks to the store to get her own groceries.     In the ED, VS T 99.1 HR: 70 BP: 142/71 RR: 16 SpO2: 98% in RA. Labs unremarkable. B/l LE duplex c/w Left calf DVT. CT A/P noncont (pt was refusing contrast) Mild R hydro extending to bulky uterine fibroids. Enlarged myometrial uterus, several which are calcified. Inflammatory changes surrounding the left iliac vessels, limited evaluation without intravenous contrast. Consider repeat CT with intravenous contrast as clinically warranted.  ED contacted vascular who rec hep gtt, med admission and possible thrombectomy later in the week.      70 yo F with PMHx of Vertigo, DLD, Chronic back pain and DM noncompliant with medications because she reads articles about their side effects and does not want to take them presents to the ED for evaluation of left calf pain radiating up to the LLQ x 2 days.    States she has chronic back pain located on left lower back, that radiates to left buttock and LLE, a/w tingling, numbness, described as sharp and dull, intermittent, occurring with and w/o movement, present for the last year, 10/10 at its intensity, was scheduled for PT session tomorrow, follows with neurology Dr Lang and was scheduled for OP c-spine MRI this week. Takes Aleve for pain, recently switched to Tylenol with helps jyoti the pain.  Two days ago began to feel LLE persistent pain, worse with weight bearing. Pt reports she is active and ambulates w/o assistance devices to the store to get her own groceries. Went to OB/GYN in [place] last year and was told that nothing could be one regarding her fibroids. Denies weight loss, fatigue, f/c, sob, c/p, palpitations, n/v/d/c, urinary symptoms.      In the ED, VS T 99.1 HR: 70 BP: 142/71 RR: 16 SpO2: 98% in RA. Labs unremarkable. B/l LE duplex c/w Left calf DVT. CT A/P noncont (pt was refusing contrast) Mild R hydro extending to bulky uterine fibroids. Enlarged myometrial uterus, several which are calcified. Inflammatory changes surrounding the left iliac vessels, limited evaluation without intravenous contrast. Consider repeat CT with intravenous contrast as clinically warranted.  ED contacted vascular who rec hep gtt, med admission and possible thrombectomy later in the week.

## 2022-02-07 LAB
A1C WITH ESTIMATED AVERAGE GLUCOSE RESULT: 6.3 % — HIGH (ref 4–5.6)
ALBUMIN SERPL ELPH-MCNC: 3.9 G/DL — SIGNIFICANT CHANGE UP (ref 3.5–5.2)
ALP SERPL-CCNC: 96 U/L — SIGNIFICANT CHANGE UP (ref 30–115)
ALT FLD-CCNC: 43 U/L — HIGH (ref 0–41)
ANION GAP SERPL CALC-SCNC: 12 MMOL/L — SIGNIFICANT CHANGE UP (ref 7–14)
APTT BLD: 101.8 SEC — CRITICAL HIGH (ref 27–39.2)
APTT BLD: 188.2 SEC — CRITICAL HIGH (ref 27–39.2)
APTT BLD: 57.4 SEC — HIGH (ref 27–39.2)
APTT BLD: 89 SEC — CRITICAL HIGH (ref 27–39.2)
AST SERPL-CCNC: 36 U/L — SIGNIFICANT CHANGE UP (ref 0–41)
BASOPHILS # BLD AUTO: 0.03 K/UL — SIGNIFICANT CHANGE UP (ref 0–0.2)
BASOPHILS NFR BLD AUTO: 0.3 % — SIGNIFICANT CHANGE UP (ref 0–1)
BILIRUB SERPL-MCNC: 0.6 MG/DL — SIGNIFICANT CHANGE UP (ref 0.2–1.2)
BLD GP AB SCN SERPL QL: SIGNIFICANT CHANGE UP
BUN SERPL-MCNC: 16 MG/DL — SIGNIFICANT CHANGE UP (ref 10–20)
CALCIUM SERPL-MCNC: 9 MG/DL — SIGNIFICANT CHANGE UP (ref 8.5–10.1)
CHLORIDE SERPL-SCNC: 102 MMOL/L — SIGNIFICANT CHANGE UP (ref 98–110)
CHOLEST SERPL-MCNC: 223 MG/DL — HIGH
CO2 SERPL-SCNC: 23 MMOL/L — SIGNIFICANT CHANGE UP (ref 17–32)
CREAT SERPL-MCNC: 0.8 MG/DL — SIGNIFICANT CHANGE UP (ref 0.7–1.5)
EOSINOPHIL # BLD AUTO: 0.11 K/UL — SIGNIFICANT CHANGE UP (ref 0–0.7)
EOSINOPHIL NFR BLD AUTO: 1.1 % — SIGNIFICANT CHANGE UP (ref 0–8)
ESTIMATED AVERAGE GLUCOSE: 134 MG/DL — HIGH (ref 68–114)
GLUCOSE BLDC GLUCOMTR-MCNC: 107 MG/DL — HIGH (ref 70–99)
GLUCOSE BLDC GLUCOMTR-MCNC: 126 MG/DL — HIGH (ref 70–99)
GLUCOSE BLDC GLUCOMTR-MCNC: 174 MG/DL — HIGH (ref 70–99)
GLUCOSE SERPL-MCNC: 167 MG/DL — HIGH (ref 70–99)
HCT VFR BLD CALC: 43.1 % — SIGNIFICANT CHANGE UP (ref 37–47)
HCV AB S/CO SERPL IA: 0.08 COI — SIGNIFICANT CHANGE UP
HCV AB SERPL-IMP: SIGNIFICANT CHANGE UP
HDLC SERPL-MCNC: 72 MG/DL — SIGNIFICANT CHANGE UP
HGB BLD-MCNC: 13.8 G/DL — SIGNIFICANT CHANGE UP (ref 12–16)
IMM GRANULOCYTES NFR BLD AUTO: 0.4 % — HIGH (ref 0.1–0.3)
LIPID PNL WITH DIRECT LDL SERPL: 137 MG/DL — HIGH
LYMPHOCYTES # BLD AUTO: 1.86 K/UL — SIGNIFICANT CHANGE UP (ref 1.2–3.4)
LYMPHOCYTES # BLD AUTO: 18.8 % — LOW (ref 20.5–51.1)
MAGNESIUM SERPL-MCNC: 2.1 MG/DL — SIGNIFICANT CHANGE UP (ref 1.8–2.4)
MCHC RBC-ENTMCNC: 28.6 PG — SIGNIFICANT CHANGE UP (ref 27–31)
MCHC RBC-ENTMCNC: 32 G/DL — SIGNIFICANT CHANGE UP (ref 32–37)
MCV RBC AUTO: 89.2 FL — SIGNIFICANT CHANGE UP (ref 81–99)
MONOCYTES # BLD AUTO: 0.71 K/UL — HIGH (ref 0.1–0.6)
MONOCYTES NFR BLD AUTO: 7.2 % — SIGNIFICANT CHANGE UP (ref 1.7–9.3)
NEUTROPHILS # BLD AUTO: 7.16 K/UL — HIGH (ref 1.4–6.5)
NEUTROPHILS NFR BLD AUTO: 72.2 % — SIGNIFICANT CHANGE UP (ref 42.2–75.2)
NON HDL CHOLESTEROL: 151 MG/DL — HIGH
NRBC # BLD: 0 /100 WBCS — SIGNIFICANT CHANGE UP (ref 0–0)
PLATELET # BLD AUTO: 290 K/UL — SIGNIFICANT CHANGE UP (ref 130–400)
POTASSIUM SERPL-MCNC: 3.7 MMOL/L — SIGNIFICANT CHANGE UP (ref 3.5–5)
POTASSIUM SERPL-SCNC: 3.7 MMOL/L — SIGNIFICANT CHANGE UP (ref 3.5–5)
PROT SERPL-MCNC: 7.5 G/DL — SIGNIFICANT CHANGE UP (ref 6–8)
RBC # BLD: 4.83 M/UL — SIGNIFICANT CHANGE UP (ref 4.2–5.4)
RBC # FLD: 13.5 % — SIGNIFICANT CHANGE UP (ref 11.5–14.5)
SARS-COV-2 RNA SPEC QL NAA+PROBE: SIGNIFICANT CHANGE UP
SODIUM SERPL-SCNC: 137 MMOL/L — SIGNIFICANT CHANGE UP (ref 135–146)
TRIGL SERPL-MCNC: 74 MG/DL — SIGNIFICANT CHANGE UP
WBC # BLD: 9.91 K/UL — SIGNIFICANT CHANGE UP (ref 4.8–10.8)
WBC # FLD AUTO: 9.91 K/UL — SIGNIFICANT CHANGE UP (ref 4.8–10.8)

## 2022-02-07 PROCEDURE — 99233 SBSQ HOSP IP/OBS HIGH 50: CPT

## 2022-02-07 PROCEDURE — 71045 X-RAY EXAM CHEST 1 VIEW: CPT | Mod: 26

## 2022-02-07 PROCEDURE — 99222 1ST HOSP IP/OBS MODERATE 55: CPT | Mod: GC,57

## 2022-02-07 RX ORDER — LIDOCAINE 4 G/100G
1 CREAM TOPICAL DAILY
Refills: 0 | Status: DISCONTINUED | OUTPATIENT
Start: 2022-02-07 | End: 2022-02-08

## 2022-02-07 RX ORDER — DIPHENHYDRAMINE HCL 50 MG
50 CAPSULE ORAL ONCE
Refills: 0 | Status: DISCONTINUED | OUTPATIENT
Start: 2022-02-08 | End: 2022-02-08

## 2022-02-07 RX ORDER — HEPARIN SODIUM 5000 [USP'U]/ML
1100 INJECTION INTRAVENOUS; SUBCUTANEOUS
Qty: 25000 | Refills: 0 | Status: DISCONTINUED | OUTPATIENT
Start: 2022-02-07 | End: 2022-02-08

## 2022-02-07 RX ORDER — ATORVASTATIN CALCIUM 80 MG/1
20 TABLET, FILM COATED ORAL AT BEDTIME
Refills: 0 | Status: DISCONTINUED | OUTPATIENT
Start: 2022-02-07 | End: 2022-02-08

## 2022-02-07 RX ORDER — MECLIZINE HCL 12.5 MG
25 TABLET ORAL DAILY
Refills: 0 | Status: DISCONTINUED | OUTPATIENT
Start: 2022-02-07 | End: 2022-02-08

## 2022-02-07 RX ORDER — ONDANSETRON 8 MG/1
4 TABLET, FILM COATED ORAL EVERY 8 HOURS
Refills: 0 | Status: DISCONTINUED | OUTPATIENT
Start: 2022-02-07 | End: 2022-02-08

## 2022-02-07 RX ORDER — HEPARIN SODIUM 5000 [USP'U]/ML
1300 INJECTION INTRAVENOUS; SUBCUTANEOUS
Qty: 25000 | Refills: 0 | Status: DISCONTINUED | OUTPATIENT
Start: 2022-02-07 | End: 2022-02-07

## 2022-02-07 RX ORDER — SODIUM CHLORIDE 9 MG/ML
1000 INJECTION INTRAMUSCULAR; INTRAVENOUS; SUBCUTANEOUS
Refills: 0 | Status: DISCONTINUED | OUTPATIENT
Start: 2022-02-07 | End: 2022-02-08

## 2022-02-07 RX ADMIN — OXYCODONE AND ACETAMINOPHEN 1 TABLET(S): 5; 325 TABLET ORAL at 13:55

## 2022-02-07 RX ADMIN — ONDANSETRON 4 MILLIGRAM(S): 8 TABLET, FILM COATED ORAL at 20:44

## 2022-02-07 RX ADMIN — HEPARIN SODIUM 13 UNIT(S)/HR: 5000 INJECTION INTRAVENOUS; SUBCUTANEOUS at 11:07

## 2022-02-07 RX ADMIN — Medication 50 MILLIGRAM(S): at 18:46

## 2022-02-07 RX ADMIN — HEPARIN SODIUM 15 UNIT(S)/HR: 5000 INJECTION INTRAVENOUS; SUBCUTANEOUS at 00:53

## 2022-02-07 RX ADMIN — ATORVASTATIN CALCIUM 20 MILLIGRAM(S): 80 TABLET, FILM COATED ORAL at 22:02

## 2022-02-07 RX ADMIN — LIDOCAINE 1 PATCH: 4 CREAM TOPICAL at 13:08

## 2022-02-07 RX ADMIN — LIDOCAINE 1 PATCH: 4 CREAM TOPICAL at 08:31

## 2022-02-07 RX ADMIN — LIDOCAINE 1 PATCH: 4 CREAM TOPICAL at 01:32

## 2022-02-07 RX ADMIN — HEPARIN SODIUM 11 UNIT(S)/HR: 5000 INJECTION INTRAVENOUS; SUBCUTANEOUS at 22:15

## 2022-02-07 NOTE — CONSULT NOTE ADULT - SUBJECTIVE AND OBJECTIVE BOX
VASCULAR SURGERY CONSULT NOTE      HPI:  72 yo F with PMHx of Vertigo, DLD, Chronic back pain and DM noncompliant with medications because she reads articles about their side effects and does not want to take them presents to the ED for evaluation of left calf pain radiating up to the LLQ x 2 days.    States she has chronic back pain located on left lower back, that radiates to left buttock and LLE, a/w tingling, numbness, described as sharp and dull, intermittent, occurring with and w/o movement, present for the last year, 10/10 at its intensity, was scheduled for PT session tomorrow, follows with neurology Dr Lang and was scheduled for OP c-spine MRI this week. Takes Aleve for pain, recently switched to Tylenol with helps jyoti the pain.  Two days ago began to feel LLE persistent pain, worse with weight bearing. Pt reports she is active and ambulates w/o assistance devices to the store to get her own groceries. Went to OB/GYN in [place] last year and was told that nothing could be one regarding her fibroids. Denies weight loss, fatigue, f/c, sob, c/p, palpitations, n/v/d/c, urinary symptoms.      In the ED, VS T 99.1 HR: 70 BP: 142/71 RR: 16 SpO2: 98% in RA. Labs unremarkable. B/l LE duplex showing LLE DVT extending up to Iliac vessels, for which vascular surgery was called.  Patient seen bedside in ED, NAD, AF, tachy to 105, labs unremarkable.  Complains of LLE extremity pain that started 3 days ago and progressively worsened but became acutely worse this morning prompting her to come to the ED.  She endorses that her LLE feels heavy and very painful.  On exam, LLE edematous and noticeably increased in size compared to RLE.  Soft on palpation, FROM.  +DP and PT pulses bilaterally.  Denies history of prior episodes or family history of blood clots.  No recent prolonged travel.  Ambulatory at baseline.     (2022 23:09)        PAST MEDICAL & SURGICAL HISTORY:  DM (diabetes mellitus)    HTN (hypertension)    GERD (gastroesophageal reflux disease)      IV Contrast (Short breath)  sulfa drugs (Other)    Home Medications:    No pertinent family history of PVD    REVIEW OF SYSTEMS:  GENERAL:                                         negative  SKIN:                                                 negative  OPTHALMOLOGIC:                          negative  ENMT:                                               negative  RESPIRATORY AND THORAX:        negative  CARDIOVASCULAR:                         negative  GASTROINTESTINAL:                       negative  NEPHROLOGY:                                  negative  MUSCULOSKELETAL:                       negative  NEUROLOGIC:                                   negative  PSYCHIATRIC:                                    negative  HEMATOLOGY/LYMPHATICS:         negative  ENDOCRINE:                                     negative  ALLERGIC/IMMUNOLOGIC:            negative    12 point ROS otherwise normal except as stated in HPI  FHx: none    PHYSICAL EXAM  Vital Signs Last 24 Hrs  T(C): 37.3 (2022 19:51), Max: 37.3 (2022 19:51)  T(F): 99.1 (2022 19:51), Max: 99.1 (2022 19:51)  HR: 70 (2022 19:51) (70 - 105)  BP: 142/71 (2022 19:51) (130/- - 144/64)  BP(mean): --  RR: 16 (2022 19:51) (16 - 17)  SpO2: 98% (2022 19:51) (97% - 98%)    Appearance: Normal	  Cardiovascular: Sinus tachy  Respiratory: Unlabored breathing at rest  Gastrointestinal:  Soft, Non-tender, positive BS	  Abdomen: s/nt/nd  Vascular: LLE edematous and noticeably increased in size compared to RLE.  Soft on palpation, FROM.  +DP and PT pulses bilaterally.        MEDICATIONS:   MEDICATIONS  (STANDING):  atorvastatin 20 milliGRAM(s) Oral at bedtime  chlorhexidine 4% Liquid 1 Application(s) Topical <User Schedule>  heparin  Infusion 1500 Unit(s)/Hr (15 mL/Hr) IV Continuous <Continuous>  lidocaine   4% Patch 1 Patch Transdermal daily    MEDICATIONS  (PRN):  acetaminophen     Tablet .. 650 milliGRAM(s) Oral every 6 hours PRN Temp greater or equal to 38C (100.4F), Mild Pain (1 - 3)  meclizine 25 milliGRAM(s) Oral daily PRN Dizziness  oxycodone    5 mG/acetaminophen 325 mG 1 Tablet(s) Oral every 6 hours PRN Moderate Pain (4 - 6)      LAB/STUDIES:                        14.7   9.05  )-----------( 276      ( 2022 14:30 )             45.6     02-06    136  |  100  |  14  ----------------------------<  132<H>  4.0   |  23  |  0.7    Ca    9.5      2022 14:30    TPro  7.7  /  Alb  4.2  /  TBili  0.6  /  DBili  x   /  AST  40  /  ALT  46<H>  /  AlkPhos  102  02-06    PT/INR - ( 2022 14:30 )   PT: 13.60 sec;   INR: 1.18 ratio         PTT - ( 2022 21:05 )  PTT:>200 sec  LIVER FUNCTIONS - ( 2022 14:30 )  Alb: 4.2 g/dL / Pro: 7.7 g/dL / ALK PHOS: 102 U/L / ALT: 46 U/L / AST: 40 U/L / GGT: x             Urinalysis Basic - ( 2022 16:12 )    Color: Yellow / Appearance: Slightly Turbid / S.015 / pH: x  Gluc: x / Ketone: Trace  / Bili: Negative / Urobili: <2 mg/dL   Blood: x / Protein: Trace / Nitrite: Negative   Leuk Esterase: Negative / RBC: 9 /HPF / WBC 1 /HPF   Sq Epi: x / Non Sq Epi: 2 /HPF / Bacteria: Negative      IMAGING:  LE duplex pending official read

## 2022-02-07 NOTE — PROGRESS NOTE ADULT - ATTENDING COMMENTS
71F from Home w/ PMH: Contrast Allergy? HLD, DM2, Non-compliant with meds, Chronic Low back Pain who presents with LLE Pain found to have extensive DVTs and found to have Mild Hydronephrosis and Uterine Calcified Fibroids. Pt is aware of fibroids denies pelvic pain or vaginal bleeding or wt loss.     Vital Signs Last 24 Hrs  T(C): 37.6 (07 Feb 2022 08:29), Max: 37.6 (07 Feb 2022 08:29)  T(F): 99.6 (07 Feb 2022 08:29), Max: 99.6 (07 Feb 2022 08:29)  HR: 74 (07 Feb 2022 15:50) (70 - 98)  BP: 111/58 (07 Feb 2022 15:50) (102/54 - 142/71)  RR: 20 (07 Feb 2022 15:50) (16 - 20)  SpO2: 97% (07 Feb 2022 15:50) (97% - 99%)    GEN: NAD  REsp: CTA B/L  GI: +BS, Soft, NT, ND  Ext: ++ LLE redness and tenderness to movement, No e/c/c   MS: AOx3                        13.8   9.91  )-----------( 290      ( 07 Feb 2022 05:30 )             43.1     02-07    137  |  102  |  16  ----------------------------<  167<H>  3.7   |  23  |  0.8    Ca    9.0      07 Feb 2022 05:30  Mg     2.1     02-07    TPro  7.5  /  Alb  3.9  /  TBili  0.6  /  DBili  x   /  AST  36  /  ALT  43<H>  /  AlkPhos  96  02-07    IMPRESSION:   LLE DVTs  Uterine Fibroids    Plan: As outlined in above resident as reviewed.  Pt going for Thrombectomy by Vascular tomorrow  c/w Heparin drip and check PTT q6h   NPO past MN today  Pre-treat for IV Contrast as per Vascular Note Recs  Give 50% of night dose Lantus tonight   Can start LR in am if FS below 110  CHeck HA1C  Hold all oral hypoglycemics (metformin/Jardiance)   Monitor FS qac/hs  Pt makes own medical decisions but wants her daughter to contacted as well before surgery    Uterine Fibroid w/ Mild Hydro and NL Renal Function  - Asymptomatic  - Check full pelvic sonogram  - f/u with outpt GYN for further care and to r/o Sarcoma of Uterine     Dispo: Acute

## 2022-02-07 NOTE — CHART NOTE - NSCHARTNOTEFT_GEN_A_CORE
Vascular Surgery Pre-op Note    Patient is a 71y old  Female who presents with a chief complaint of Left calf pain (2022 01:06)      Procedure: left lower extremity venogram, possible thrombectomy  Surgeon: Dr. Saucedo    Vitals/Labs:  Vital Signs Last 24 Hrs  T(C): 37.6 (2022 08:29), Max: 37.6 (2022 08:29)  T(F): 99.6 (2022 08:29), Max: 99.6 (2022 08:29)  HR: 97 (2022 08:29) (70 - 105)  BP: 134/61 (2022 08:29) (102/54 - 144/64)  BP(mean): --  RR: 18 (2022 08:29) (16 - 18)  SpO2: 99% (2022 08:29) (97% - 99%)    I&O's Detail                            13.8   9.91  )-----------( 290      ( 2022 05:30 )             43.1       02-07    137  |  102  |  16  ----------------------------<  167<H>  3.7   |  23  |  0.8    Ca    9.0      2022 05:30  Mg     2.1     02-07    TPro  7.5  /  Alb  3.9  /  TBili  0.6  /  DBili  x   /  AST  36  /  ALT  43<H>  /  AlkPhos  96  02-07      CAPILLARY BLOOD GLUCOSE      POCT Blood Glucose.: 126 mg/dL (2022 08:21)  POCT Blood Glucose.: 107 mg/dL (2022 00:12)      LIVER FUNCTIONS - ( 2022 05:30 )  Alb: 3.9 g/dL / Pro: 7.5 g/dL / ALK PHOS: 96 U/L / ALT: 43 U/L / AST: 36 U/L / GGT: x             PT/INR - ( 2022 14:30 )   PT: 13.60 sec;   INR: 1.18 ratio         PTT - ( 2022 05:30 )  PTT:188.2 sec    Urinalysis Basic - ( 2022 16:12 )    Color: Yellow / Appearance: Slightly Turbid / S.015 / pH: x  Gluc: x / Ketone: Trace  / Bili: Negative / Urobili: <2 mg/dL   Blood: x / Protein: Trace / Nitrite: Negative   Leuk Esterase: Negative / RBC: 9 /HPF / WBC 1 /HPF   Sq Epi: x / Non Sq Epi: 2 /HPF / Bacteria: Negative        T&S: 22      Imaging:   Chest X RAY:       [id number]    Assessment & Plan:  RENEA POWERS 71y Female    - NPO after midnight  - IVF while NPO  - Heparin drip: continue the drip until patient gets to pre-op holding before the procedure then hold  - Pre-medicate for IV contrast allergy with Prednisone 50 mg po x 1 @ 6 pm, then  50 mg po x 1 @ midnight, then 50 mg po x 1 @ 6 AM 22  Give Benadryl 50 mg IV to call to OR   - CXR     MEDICATIONS  (STANDING):  atorvastatin 20 milliGRAM(s) Oral at bedtime  chlorhexidine 4% Liquid 1 Application(s) Topical <User Schedule>  lidocaine   4% Patch 1 Patch Transdermal daily    MEDICATIONS  (PRN):  acetaminophen     Tablet .. 650 milliGRAM(s) Oral every 6 hours PRN Temp greater or equal to 38C (100.4F), Mild Pain (1 - 3)  meclizine 25 milliGRAM(s) Oral daily PRN Dizziness  oxycodone    5 mG/acetaminophen 325 mG 1 Tablet(s) Oral every 6 hours PRN Moderate Pain (4 - 6)

## 2022-02-07 NOTE — CONSULT NOTE ADULT - ASSESSMENT
71F w/ DM noncompliant with medications, chronic back pain, who presents with imaging and clinical exam findings consistent with acute LLE DVT extending to the L external Iliac vein.  Patient admitted to medical service and started on a heparin gtt.    Plan:   - Continue Heparin gtt, leg elevation   - Vascular team to see in AM, will evaluate for possible thrombectomy   - Discussed with Vascular fellow

## 2022-02-07 NOTE — PROGRESS NOTE ADULT - ASSESSMENT
70 yo F with PMHx of Vertigo, DLD, and DM noncompliant with medications because she reads articles about their side effects and does not want to take them presents to the ED for evaluation of left calf pain radiating up to the LLQ x 2 days found to have L calf DVT.     # L calf DVT   - B/l LE duplex c/w L dvt  - Hep gtt with PTT ATC (goal 60-80), f/u PPTs   - Monitor for signs of bleed  - Pain control   - F/u vasculare c/s for possible thrombectomy   - PT eval     # Chronic back pain  - Pain control  - PT eval  - Continue w/u with Dr Lang as OP    # Calcified enlarged myometrium   # Bulky leiomyomas  # LLQ abd pain   - Physical with tense and tender pelvic area  - CT A/P noncont: Mild R hydro extending to bulky uterine fibroids. Enlarged myometrial uterus, several which are calcified. Inflammatory changes surrounding the left iliac vessels, limited evaluation without intravenous contrast. Consider repeat CT with intravenous contrast as clinically warranted  - Pt refuses contrast initially however no agreeable, will pre-tx w/ prednisone and benadryl as per vacular   - pain control with tylenol, percocet   - OB eval as outpt  - f/u pelvic sono     # DLD   - Off meds, will order Atorvastatin 20mg po Qd  - F/u lipid profile     # DM  - Monitor FS  - Start basal bolus if FS persistently >180  - F/u A1c     # Vertigo  - Was rx Meclizine unsure of dose  - Start Meclizine 25mg PO PRN     DVT ppx: hep gtt  GI ppx: None  Diet: CC   Activity: IAT  Dispo: Acute

## 2022-02-07 NOTE — PATIENT PROFILE ADULT - FALL HARM RISK - HARM RISK INTERVENTIONS

## 2022-02-08 ENCOUNTER — RESULT REVIEW (OUTPATIENT)
Age: 72
End: 2022-02-08

## 2022-02-08 LAB
ALBUMIN SERPL ELPH-MCNC: 3.7 G/DL — SIGNIFICANT CHANGE UP (ref 3.5–5.2)
ALP SERPL-CCNC: 105 U/L — SIGNIFICANT CHANGE UP (ref 30–115)
ALT FLD-CCNC: 79 U/L — HIGH (ref 0–41)
ANION GAP SERPL CALC-SCNC: 12 MMOL/L — SIGNIFICANT CHANGE UP (ref 7–14)
APTT BLD: 70 SEC — HIGH (ref 27–39.2)
APTT BLD: 93.9 SEC — CRITICAL HIGH (ref 27–39.2)
AST SERPL-CCNC: 61 U/L — HIGH (ref 0–41)
BILIRUB SERPL-MCNC: 0.6 MG/DL — SIGNIFICANT CHANGE UP (ref 0.2–1.2)
BUN SERPL-MCNC: 16 MG/DL — SIGNIFICANT CHANGE UP (ref 10–20)
CALCIUM SERPL-MCNC: 8.8 MG/DL — SIGNIFICANT CHANGE UP (ref 8.5–10.1)
CHLORIDE SERPL-SCNC: 101 MMOL/L — SIGNIFICANT CHANGE UP (ref 98–110)
CO2 SERPL-SCNC: 23 MMOL/L — SIGNIFICANT CHANGE UP (ref 17–32)
CREAT SERPL-MCNC: 0.7 MG/DL — SIGNIFICANT CHANGE UP (ref 0.7–1.5)
CULTURE RESULTS: SIGNIFICANT CHANGE UP
GLUCOSE BLDC GLUCOMTR-MCNC: 129 MG/DL — HIGH (ref 70–99)
GLUCOSE BLDC GLUCOMTR-MCNC: 139 MG/DL — HIGH (ref 70–99)
GLUCOSE BLDC GLUCOMTR-MCNC: 147 MG/DL — HIGH (ref 70–99)
GLUCOSE BLDC GLUCOMTR-MCNC: 151 MG/DL — HIGH (ref 70–99)
GLUCOSE BLDC GLUCOMTR-MCNC: 162 MG/DL — HIGH (ref 70–99)
GLUCOSE SERPL-MCNC: 168 MG/DL — HIGH (ref 70–99)
HCT VFR BLD CALC: 39.7 % — SIGNIFICANT CHANGE UP (ref 37–47)
HGB BLD-MCNC: 13 G/DL — SIGNIFICANT CHANGE UP (ref 12–16)
MAGNESIUM SERPL-MCNC: 2.1 MG/DL — SIGNIFICANT CHANGE UP (ref 1.8–2.4)
MCHC RBC-ENTMCNC: 28.4 PG — SIGNIFICANT CHANGE UP (ref 27–31)
MCHC RBC-ENTMCNC: 32.7 G/DL — SIGNIFICANT CHANGE UP (ref 32–37)
MCV RBC AUTO: 86.9 FL — SIGNIFICANT CHANGE UP (ref 81–99)
NRBC # BLD: 0 /100 WBCS — SIGNIFICANT CHANGE UP (ref 0–0)
PLATELET # BLD AUTO: 317 K/UL — SIGNIFICANT CHANGE UP (ref 130–400)
POTASSIUM SERPL-MCNC: 4.3 MMOL/L — SIGNIFICANT CHANGE UP (ref 3.5–5)
POTASSIUM SERPL-SCNC: 4.3 MMOL/L — SIGNIFICANT CHANGE UP (ref 3.5–5)
PROT SERPL-MCNC: 7 G/DL — SIGNIFICANT CHANGE UP (ref 6–8)
RBC # BLD: 4.57 M/UL — SIGNIFICANT CHANGE UP (ref 4.2–5.4)
RBC # FLD: 13.2 % — SIGNIFICANT CHANGE UP (ref 11.5–14.5)
SODIUM SERPL-SCNC: 136 MMOL/L — SIGNIFICANT CHANGE UP (ref 135–146)
SPECIMEN SOURCE: SIGNIFICANT CHANGE UP
WBC # BLD: 7.73 K/UL — SIGNIFICANT CHANGE UP (ref 4.8–10.8)
WBC # FLD AUTO: 7.73 K/UL — SIGNIFICANT CHANGE UP (ref 4.8–10.8)

## 2022-02-08 PROCEDURE — 88304 TISSUE EXAM BY PATHOLOGIST: CPT | Mod: 26

## 2022-02-08 PROCEDURE — 75825 VEIN X-RAY TRUNK: CPT | Mod: 26,59

## 2022-02-08 PROCEDURE — 76856 US EXAM PELVIC COMPLETE: CPT | Mod: 26

## 2022-02-08 PROCEDURE — 36010 PLACE CATHETER IN VEIN: CPT | Mod: GC

## 2022-02-08 PROCEDURE — 99233 SBSQ HOSP IP/OBS HIGH 50: CPT

## 2022-02-08 PROCEDURE — 75820 VEIN X-RAY ARM/LEG: CPT | Mod: 26,GC,59

## 2022-02-08 PROCEDURE — 76770 US EXAM ABDO BACK WALL COMP: CPT | Mod: 26

## 2022-02-08 PROCEDURE — 76937 US GUIDE VASCULAR ACCESS: CPT | Mod: 26,GC

## 2022-02-08 PROCEDURE — 37187 VENOUS MECH THROMBECTOMY: CPT | Mod: GC

## 2022-02-08 RX ORDER — CHLORHEXIDINE GLUCONATE 213 G/1000ML
1 SOLUTION TOPICAL
Refills: 0 | Status: DISCONTINUED | OUTPATIENT
Start: 2022-02-08 | End: 2022-02-11

## 2022-02-08 RX ORDER — ONDANSETRON 8 MG/1
4 TABLET, FILM COATED ORAL ONCE
Refills: 0 | Status: DISCONTINUED | OUTPATIENT
Start: 2022-02-08 | End: 2022-02-08

## 2022-02-08 RX ORDER — ONDANSETRON 8 MG/1
4 TABLET, FILM COATED ORAL EVERY 8 HOURS
Refills: 0 | Status: DISCONTINUED | OUTPATIENT
Start: 2022-02-08 | End: 2022-02-11

## 2022-02-08 RX ORDER — ATORVASTATIN CALCIUM 80 MG/1
20 TABLET, FILM COATED ORAL AT BEDTIME
Refills: 0 | Status: DISCONTINUED | OUTPATIENT
Start: 2022-02-08 | End: 2022-02-11

## 2022-02-08 RX ORDER — HYDROCORTISONE 20 MG
100 TABLET ORAL ONCE
Refills: 0 | Status: COMPLETED | OUTPATIENT
Start: 2022-02-08 | End: 2022-02-08

## 2022-02-08 RX ORDER — HEPARIN SODIUM 5000 [USP'U]/ML
1000 INJECTION INTRAVENOUS; SUBCUTANEOUS
Qty: 25000 | Refills: 0 | Status: DISCONTINUED | OUTPATIENT
Start: 2022-02-08 | End: 2022-02-09

## 2022-02-08 RX ORDER — SODIUM CHLORIDE 9 MG/ML
1000 INJECTION, SOLUTION INTRAVENOUS
Refills: 0 | Status: DISCONTINUED | OUTPATIENT
Start: 2022-02-08 | End: 2022-02-08

## 2022-02-08 RX ORDER — SODIUM CHLORIDE 9 MG/ML
1000 INJECTION INTRAMUSCULAR; INTRAVENOUS; SUBCUTANEOUS
Refills: 0 | Status: DISCONTINUED | OUTPATIENT
Start: 2022-02-08 | End: 2022-02-11

## 2022-02-08 RX ORDER — MECLIZINE HCL 12.5 MG
25 TABLET ORAL DAILY
Refills: 0 | Status: DISCONTINUED | OUTPATIENT
Start: 2022-02-08 | End: 2022-02-11

## 2022-02-08 RX ORDER — HEPARIN SODIUM 5000 [USP'U]/ML
1000 INJECTION INTRAVENOUS; SUBCUTANEOUS
Qty: 25000 | Refills: 0 | Status: DISCONTINUED | OUTPATIENT
Start: 2022-02-08 | End: 2022-02-08

## 2022-02-08 RX ORDER — ACETAMINOPHEN 500 MG
650 TABLET ORAL EVERY 6 HOURS
Refills: 0 | Status: DISCONTINUED | OUTPATIENT
Start: 2022-02-08 | End: 2022-02-11

## 2022-02-08 RX ORDER — LIDOCAINE 4 G/100G
1 CREAM TOPICAL DAILY
Refills: 0 | Status: DISCONTINUED | OUTPATIENT
Start: 2022-02-08 | End: 2022-02-11

## 2022-02-08 RX ORDER — HYDROMORPHONE HYDROCHLORIDE 2 MG/ML
0.5 INJECTION INTRAMUSCULAR; INTRAVENOUS; SUBCUTANEOUS
Refills: 0 | Status: DISCONTINUED | OUTPATIENT
Start: 2022-02-08 | End: 2022-02-08

## 2022-02-08 RX ADMIN — Medication 50 MILLIGRAM(S): at 00:49

## 2022-02-08 RX ADMIN — SODIUM CHLORIDE 100 MILLILITER(S): 9 INJECTION INTRAMUSCULAR; INTRAVENOUS; SUBCUTANEOUS at 00:48

## 2022-02-08 RX ADMIN — HYDROMORPHONE HYDROCHLORIDE 0.5 MILLIGRAM(S): 2 INJECTION INTRAMUSCULAR; INTRAVENOUS; SUBCUTANEOUS at 19:43

## 2022-02-08 RX ADMIN — Medication 50 MILLIGRAM(S): at 06:03

## 2022-02-08 RX ADMIN — Medication 100 MILLIGRAM(S): at 15:36

## 2022-02-08 RX ADMIN — HEPARIN SODIUM 10 UNIT(S)/HR: 5000 INJECTION INTRAVENOUS; SUBCUTANEOUS at 18:27

## 2022-02-08 RX ADMIN — ATORVASTATIN CALCIUM 20 MILLIGRAM(S): 80 TABLET, FILM COATED ORAL at 21:56

## 2022-02-08 RX ADMIN — HYDROMORPHONE HYDROCHLORIDE 0.5 MILLIGRAM(S): 2 INJECTION INTRAMUSCULAR; INTRAVENOUS; SUBCUTANEOUS at 18:24

## 2022-02-08 RX ADMIN — HEPARIN SODIUM 10 UNIT(S)/HR: 5000 INJECTION INTRAVENOUS; SUBCUTANEOUS at 03:06

## 2022-02-08 RX ADMIN — SODIUM CHLORIDE 100 MILLILITER(S): 9 INJECTION INTRAMUSCULAR; INTRAVENOUS; SUBCUTANEOUS at 22:56

## 2022-02-08 NOTE — BRIEF OPERATIVE NOTE - NSICDXBRIEFPROCEDURE_GEN_ALL_CORE_FT
PROCEDURES:  IVC venogram 08-Feb-2022 18:13:15  Lavern Lopez  Endovascular mechanical thrombectomy of vein 08-Feb-2022 18:14:38  Lavern Lopez

## 2022-02-08 NOTE — BRIEF OPERATIVE NOTE - OPERATION/FINDINGS
Preop; LLE DVT   operation:  venogram, IVC venogram, mechanical thrombecttomy of vein Preop; LLE DVT   operation:  LLE venogram, IVC venogram, mechanical thrombectomy of vein

## 2022-02-08 NOTE — PROGRESS NOTE ADULT - ASSESSMENT
72 yo F with PMHx of Vertigo, DLD, and DM noncompliant with medications because she reads articles about their side effects and does not want to take them presents to the ED for evaluation of left calf pain radiating up to the LLQ x 2 days found to have L calf DVT.     # L calf DVT   - B/l LE duplex c/w L dvt  - Hep gtt with PTT ATC (goal 60-80), f/u PPTs   - Monitor for signs of bleed  - Pain control   - Vascular venogram + possible thrombectomy today  - PT eval after venogram    # Chronic back pain  - Pain control  - PT eval  - Continue w/u with Dr Lang as OP    # Calcified enlarged myometrium   # Bulky leiomyomas  # LLQ abd pain   - Physical with tense and tender pelvic area  - CT A/P noncont: Mild R hydro extending to bulky uterine fibroids. Enlarged myometrial uterus, several which are calcified. Inflammatory changes surrounding the left iliac vessels, limited evaluation without intravenous contrast. Consider repeat CT with intravenous contrast as clinically warranted  - Pt refuses contrast initially however no agreeable, will pre-tx w/ prednisone and benadryl as per vacular   - pain control with tylenol, percocet   - OB consult --> pt hasn't followed up in many years with Gyn and has had increasing bulky sensation, pain, and R hydronephrosis  - f/u pelvic sono     # DLD   - Off meds, will order Atorvastatin 20mg po Qd  - F/u lipid profile     # DM  - Monitor FS  - Start basal bolus if FS persistently >180  - F/u A1c     # Vertigo  - Was rx Meclizine unsure of dose  - Start Meclizine 25mg PO PRN     DVT ppx: hep gtt  GI ppx: None  Diet: NPO for venogram  Activity: IAT  Dispo: Acute

## 2022-02-08 NOTE — CHART NOTE - NSCHARTNOTEFT_GEN_A_CORE
PACU ANESTHESIA ADMISSION NOTE      Procedure:   Post op diagnosis:      ____  Intubated  TV:______       Rate: ______      FiO2: ______    __x__  Patent Airway    ____  Full return of protective reflexes    ____  Full recovery from anesthesia / back to baseline     Vitals:   T:       98    R:    12              BP:    159/82               Sat:    100%                P:  85      Mental Status:  __x__ Awake   _____ Alert   _____ Drowsy   _____ Sedated    Nausea/Vomiting:  __x__ NO  ______Yes,   See Post - Op Orders          Pain Scale (0-10):  _____    Treatment: ____ None    ___x_ See Post - Op/PCA Orders    Post - Operative Fluids:   ____ Oral   ___x_ See Post - Op Orders    Plan: Discharge:   ____Home       ___x__Floor     _____Critical Care    _____  Other:_________________    Comments: Uneventful intraoperative course. No anesthesia issues or complications noted.  Patient stable upon arrival to PACU. Report given to RN. Discharge when criteria met.

## 2022-02-08 NOTE — PROGRESS NOTE ADULT - ASSESSMENT
70 yo F with PMHx of Vertigo, DLD, Chronic back pain and DM noncompliant with medications because she reads articles about their side effects and does not want to take them presents to the ED for evaluation of left calf pain radiating up to the LLQ x 2 days.    # Left lower ext DVT   - VA Duplex Lower Ext Vein Scan, Bilat (02.06.22 @ 16:37) >Acute deep vein thrombosis of theleft external iliac, common femoral,   deep femoral, femoral, popliteal, gastrocnemius venous sinus, anterior tibial, posterior tibial and peroneal veins.  - vascular surgery --> left lower extremity venogram, possible thrombectomy today  - hold IV heparin for procedure  - c/w pain meds    # Enlarged myometrial uterus  # Bulky  uterine fibroids  - CT Abdomen and Pelvis No Cont (02.06.22 @ 17:21) >Mild right hydronephrosis extending to bulky uterine fibroids.Enlarged myometrial uterus, several which are calcified. Inflammatory changes surrounding the left iliac vessels, limited  evaluation without intravenous contrast.   - Gyn eval    # Right Hydronephrosis sec to enlarged uteruus    # DM type2  - A1C with Estimated Average Glucose Result: 6.3 % (02.07.22 @ 05:30)  -monitor FS    # Chronic back pain probably se to bulky uterus/ radiculopathy  - c/w pain meds    # Vertigo  - c/w meclizine    # Dyslipidemia  - statin    # Non compliant with meds  - pt conseled    # Full code    # Pending:  left lower extremity venogram, possible thrombectomy today  # Discussed plan of care with patient  # Disposition: Home when stable

## 2022-02-08 NOTE — CHART NOTE - NSCHARTNOTEFT_GEN_A_CORE
Post Operative Note  Patient: RENEA POWERS 71y (1950) Female   MRN: [id number]  Location: [address]  Visit: 02-06-22 Inpatient  Date: 02-08-22 @ 22:11    Procedure:  S/P IVC venogram    Endovascular mechanical thrombectomy of vein      Subjective:   Nausea: no, Vomiting:  no, Ambulating: no, Flatus:  no  Pain Assessment: Patient is complaining of mild pain that is appropriate for post-operative course.     Objective:  Vitals: T(F): 97.2 (02-08-22 @ 20:04), Max: 99.3 (02-08-22 @ 01:00)  HR: 60 (02-08-22 @ 20:04)  BP: 118/63 (02-08-22 @ 20:04) (111/58 - 163/62)  RR: 20 (02-08-22 @ 20:04)  SpO2: 97% (02-08-22 @ 20:04)  Vent Settings:     In:   IV Fluids: sodium chloride 0.9%. 1000 milliLiter(s) (100 mL/Hr) IV Continuous <Continuous>      Out:   EBL:   Voided Urine:   Alexandre Catheter: yes no   Drains:   JADE:  ,   Chest Tube:    NG Tube:     Physical Examination:  General Appearance: NAD,   Heart: RRR  Lungs: CTABL  Abdomen:  Soft, nontender,   MSK/Extremities: Warm & well-perfused. Peripheral pulses intact. LLE covered with ACE, moderately swollen, nontender      Medications: [Standing]  acetaminophen     Tablet .. 650 milliGRAM(s) Oral every 6 hours PRN  atorvastatin 20 milliGRAM(s) Oral at bedtime  chlorhexidine 4% Liquid 1 Application(s) Topical <User Schedule>  heparin  Infusion 1000 Unit(s)/Hr IV Continuous <Continuous>  lidocaine   4% Patch 1 Patch Transdermal daily  meclizine 25 milliGRAM(s) Oral daily PRN  ondansetron Injectable 4 milliGRAM(s) IV Push every 8 hours PRN  sodium chloride 0.9%. 1000 milliLiter(s) IV Continuous <Continuous>    Medications: [PRN]  acetaminophen     Tablet .. 650 milliGRAM(s) Oral every 6 hours PRN  atorvastatin 20 milliGRAM(s) Oral at bedtime  chlorhexidine 4% Liquid 1 Application(s) Topical <User Schedule>  heparin  Infusion 1000 Unit(s)/Hr IV Continuous <Continuous>  lidocaine   4% Patch 1 Patch Transdermal daily  meclizine 25 milliGRAM(s) Oral daily PRN  ondansetron Injectable 4 milliGRAM(s) IV Push every 8 hours PRN  sodium chloride 0.9%. 1000 milliLiter(s) IV Continuous <Continuous>      DVT PROPHYLAXIS: heparin  Infusion 1000 Unit(s)/Hr IV Continuous <Continuous>    GI PROPHYLAXIS:   ANTICOAGULATION:   ANTIBIOTICS:        Labs:                        13.0   7.73  )-----------( 317      ( 08 Feb 2022 07:00 )             39.7     02-08    136  |  101  |  16  ----------------------------<  168<H>  4.3   |  23  |  0.7    Ca    8.8      08 Feb 2022 07:00  Mg     2.1     02-08    TPro  7.0  /  Alb  3.7  /  TBili  0.6  /  DBili  x   /  AST  61<H>  /  ALT  79<H>  /  AlkPhos  105  02-08    PTT - ( 08 Feb 2022 07:00 )  PTT:70.0 sec        Imaging:  No post-op imaging studies    Assessment:  71yF s/p venogram, mechanical thrombectomy of LLE     Plan:  - heparin drip, monitor ptt  - Monitor vitals  - Monitor post-op labs and replete as necessary  - Monitor for bowel function  - Encourage ambulation as tolerated  - Monitor urine output   - Monitor wound and dressing for changes, redress as needed.    Date/Time: 02-08-22 @ 22:11

## 2022-02-08 NOTE — PRE-ANESTHESIA EVALUATION ADULT - LAST STRESS TEST
X>4 METS, patient goes shopping and climb two flight stairs with no SOB
X>4 METS, patient goes shopping and climb two flight stairs with no SOB

## 2022-02-08 NOTE — PRE-ANESTHESIA EVALUATION ADULT - ANESTHESIA, PREVIOUS REACTION, PROFILE
Pt has  in [facility] years ago, had GA/none
Pt has  in [facility] years ago, had GA/none

## 2022-02-09 ENCOUNTER — TRANSCRIPTION ENCOUNTER (OUTPATIENT)
Age: 72
End: 2022-02-09

## 2022-02-09 LAB
APTT BLD: 124.9 SEC — CRITICAL HIGH (ref 27–39.2)
APTT BLD: 31.5 SEC — SIGNIFICANT CHANGE UP (ref 27–39.2)
APTT BLD: 42.3 SEC — HIGH (ref 27–39.2)
APTT BLD: 61.2 SEC — HIGH (ref 27–39.2)
APTT BLD: 62 SEC — HIGH (ref 27–39.2)
GLUCOSE BLDC GLUCOMTR-MCNC: 124 MG/DL — HIGH (ref 70–99)
HCT VFR BLD CALC: 36.3 % — LOW (ref 37–47)
HGB BLD-MCNC: 11.9 G/DL — LOW (ref 12–16)
MCHC RBC-ENTMCNC: 28.8 PG — SIGNIFICANT CHANGE UP (ref 27–31)
MCHC RBC-ENTMCNC: 32.8 G/DL — SIGNIFICANT CHANGE UP (ref 32–37)
MCV RBC AUTO: 87.9 FL — SIGNIFICANT CHANGE UP (ref 81–99)
NRBC # BLD: 0 /100 WBCS — SIGNIFICANT CHANGE UP (ref 0–0)
PLATELET # BLD AUTO: 313 K/UL — SIGNIFICANT CHANGE UP (ref 130–400)
RBC # BLD: 4.13 M/UL — LOW (ref 4.2–5.4)
RBC # FLD: 13.1 % — SIGNIFICANT CHANGE UP (ref 11.5–14.5)
WBC # BLD: 10.29 K/UL — SIGNIFICANT CHANGE UP (ref 4.8–10.8)
WBC # FLD AUTO: 10.29 K/UL — SIGNIFICANT CHANGE UP (ref 4.8–10.8)

## 2022-02-09 PROCEDURE — 99231 SBSQ HOSP IP/OBS SF/LOW 25: CPT | Mod: GC

## 2022-02-09 PROCEDURE — 99221 1ST HOSP IP/OBS SF/LOW 40: CPT | Mod: GC

## 2022-02-09 PROCEDURE — 99233 SBSQ HOSP IP/OBS HIGH 50: CPT

## 2022-02-09 RX ORDER — APIXABAN 2.5 MG/1
10 TABLET, FILM COATED ORAL EVERY 12 HOURS
Refills: 0 | Status: DISCONTINUED | OUTPATIENT
Start: 2022-02-09 | End: 2022-02-09

## 2022-02-09 RX ORDER — APIXABAN 2.5 MG/1
1 TABLET, FILM COATED ORAL
Qty: 60 | Refills: 0
Start: 2022-02-09 | End: 2022-03-10

## 2022-02-09 RX ORDER — HEPARIN SODIUM 5000 [USP'U]/ML
1100 INJECTION INTRAVENOUS; SUBCUTANEOUS
Qty: 25000 | Refills: 0 | Status: DISCONTINUED | OUTPATIENT
Start: 2022-02-09 | End: 2022-02-10

## 2022-02-09 RX ORDER — HEPARIN SODIUM 5000 [USP'U]/ML
700 INJECTION INTRAVENOUS; SUBCUTANEOUS
Qty: 25000 | Refills: 0 | Status: DISCONTINUED | OUTPATIENT
Start: 2022-02-09 | End: 2022-02-09

## 2022-02-09 RX ORDER — APIXABAN 2.5 MG/1
2 TABLET, FILM COATED ORAL
Qty: 28 | Refills: 0
Start: 2022-02-09 | End: 2022-02-15

## 2022-02-09 RX ORDER — ATORVASTATIN CALCIUM 80 MG/1
1 TABLET, FILM COATED ORAL
Qty: 0 | Refills: 0 | DISCHARGE
Start: 2022-02-09

## 2022-02-09 RX ORDER — MECLIZINE HCL 12.5 MG
1 TABLET ORAL
Qty: 0 | Refills: 0 | DISCHARGE
Start: 2022-02-09

## 2022-02-09 RX ADMIN — CHLORHEXIDINE GLUCONATE 1 APPLICATION(S): 213 SOLUTION TOPICAL at 05:48

## 2022-02-09 RX ADMIN — ATORVASTATIN CALCIUM 20 MILLIGRAM(S): 80 TABLET, FILM COATED ORAL at 22:26

## 2022-02-09 RX ADMIN — HEPARIN SODIUM 7 UNIT(S)/HR: 5000 INJECTION INTRAVENOUS; SUBCUTANEOUS at 02:44

## 2022-02-09 NOTE — DISCHARGE NOTE PROVIDER - CARE PROVIDERS DIRECT ADDRESSES
,DirectAddress_Unknown ,DirectAddress_Unknown,[email or web address]

## 2022-02-09 NOTE — CONSULT NOTE ADULT - SUBJECTIVE AND OBJECTIVE BOX
Chief Complaint: uterine fibroids, LLE DVT    HPI: 72yo P1 postmenopausal since 51yo, with PMHx of DM (noncompliant with medications), glaucoma, cataracts, admitted for LLE DVT s/p thrombectomy, GYN consulted for uterine fibroids noted to compress     Location -  Severity -  Quality -  Duration -  Timing -   Modifying Factors -   Associated Signs/Symptoms -     Ob/Gyn History:  G P                 LMP -                   Cycle Length -   Denies history of ovarian cysts, uterine fibroids, abnormal paps, or STIs  Last Pap Smear -   Last Mammogram -   Last Colonoscopy -        OBhx:    Denies the following: constitutional symptoms, visual symptoms, cardiovascular symptoms, respiratory symptoms, GI symptoms, musculoskeletal symptoms, skin symptoms, neurologic symptoms, hematologic symptoms, allergic symptoms, psychiatric symptoms  Except any pertinent positives listed.     Home Medications:  atorvastatin 20 mg oral tablet: 1 tab(s) orally once a day (at bedtime) (09 Feb 2022 14:56)  meclizine 25 mg oral tablet: 1 tab(s) orally once a day, As needed, Dizziness (09 Feb 2022 14:56)      Allergies    IV Contrast (Short breath)  sulfa drugs (Other)    Intolerances        PAST MEDICAL & SURGICAL HISTORY:  DM (diabetes mellitus)    HTN (hypertension)    GERD (gastroesophageal reflux disease)        FAMILY HISTORY:      SOCIAL HISTORY: Denies cigarette use, alcohol use, or illicit drug use    Vital Signs Last 24 Hrs  T(F): 98.4 (09 Feb 2022 08:41), Max: 98.4 (09 Feb 2022 08:41)  HR: 77 (09 Feb 2022 08:41) (60 - 81)  BP: 126/59 (09 Feb 2022 08:41) (111/58 - 163/62)  RR: 18 (09 Feb 2022 08:41) (17 - 20)  Height (cm): 149.9 (02-08-22 @ 15:49)  Weight (kg): 99 (02-08-22 @ 15:49)  BMI (kg/m2): 44.1 (02-08-22 @ 15:49)  BSA (m2): 1.91 (02-08-22 @ 15:49)    UO:     General Appearance - AAOx3, NAD  Heart - S1S2 regular rate and rhythm  Lung - CTA Bilaterally  Abdomen - Soft, nontender, nondistended, no rebound, no rigidity, no guarding, bowel sounds present    GYN/Pelvis:    Labia Majora - Normal  Labia Minora - Normal  Clitoris - Normal  Urethra - Normal  Vagina - Normal  Cervix - Normal    Uterus:  Size - Normal  Tenderness - None  Mass - None  Freely mobile    Adnexa:  Masses - None  Tenderness - None      Meds:   (ADM OVERRIDE) 1 each &lt;see task&gt; GiveOnce  (ADM OVERRIDE) 2 each &lt;see task&gt; GiveOnce  (ADM OVERRIDE) 1 each &lt;see task&gt; GiveOnce  (ADM OVERRIDE) 1 each &lt;see task&gt; GiveOnce  (ADM OVERRIDE) 1 each &lt;see task&gt; GiveOnce  (ADM OVERRIDE) 2 each &lt;see task&gt; GiveOnce  (ADM OVERRIDE) 1 each &lt;see task&gt; GiveOnce  (ADM OVERRIDE) 1 each &lt;see task&gt; GiveOnce  (ADM OVERRIDE) 1 each &lt;see task&gt; GiveOnce  (Floorstock) 1 each &lt;see task&gt; GiveOnce  (Floorstock) 1 each &lt;see task&gt; GiveOnce  (Floorstock) 1 each &lt;see task&gt; GiveOnce  acetaminophen     Tablet .. 650 milliGRAM(s) Oral once  heparin   Injectable 8000 Unit(s) IV Push once  hydrocortisone sodium succinate Injectable 100 milliGRAM(s) IV Push once  lactated ringers Bolus 1000 milliLiter(s) IV Bolus once  ondansetron Injectable 4 milliGRAM(s) IV Push once  predniSONE   Tablet 50 milliGRAM(s) Oral every 6 hours    Height (cm): 149.9 (02-08-22 @ 15:49)  Weight (kg): 99 (02-08-22 @ 15:49)  BMI (kg/m2): 44.1 (02-08-22 @ 15:49)  BSA (m2): 1.91 (02-08-22 @ 15:49)    LABS:                        11.9   10.29 )-----------( 313      ( 09 Feb 2022 04:30 )             36.3         02-08    136  |  101  |  16  ----------------------------<  168<H>  4.3   |  23  |  0.7    Ca    8.8      08 Feb 2022 07:00  Mg     2.1     02-08    TPro  7.0  /  Alb  3.7  /  TBili  0.6  /  DBili  x   /  AST  61<H>  /  ALT  79<H>  /  AlkPhos  105  02-08    PTT - ( 09 Feb 2022 04:30 )  PTT:61.2 sec      Culture - Urine (collected 02-06-22 @ 16:12)  Source: Clean Catch Clean Catch (Midstream)  Final Report (02-08-22 @ 09:46):    >=3 organisms. Probable collection contamination.        RADIOLOGY & ADDITIONAL STUDIES:   Chief Complaint: uterine fibroids, LLE DVT    HPI: 72yo P1 postmenopausal since 53yo, with PMHx of DM (noncompliant with medications), glaucoma, cataracts, DLD, chronic back pain, admitted for LLE DVT s/p thrombectomy, GYN consulted for uterine fibroids noted to compress left iliac vessels and causing right hydronephrosis. Reports she has known about her fibroids since , but no doctor has wanted to operate on her in the past. Reports history of heavy menses (prior to menopause). Receives OBGYN care with Dr. Cruz in [place]. Denies any history of post-menopausal bleeding. Denies HA, CP, SOB, nausea, vomiting, fevers, chills, vaginal discharge, pruritis, urinary symptoms, or changes in bowel habits.     Ob/Gyn History:                   LMP - postmenopausal since 53yo                  Cycle Length - prior to menopause, was q30d, heavy and painful menses  Denies history of ovarian cysts, abnormal paps, or STIs. Known history of multiple calcified uterine fibroids.   Last Pap Smear - , only pap smear she has ever had, reports was negative  Last Mammogram - never, as it is against her cultural beliefs   Last Colonoscopy - never    OBhx:  FT  x1, reports was uncomplicated  eTOP x4, D&C x4    Denies the following: constitutional symptoms, visual symptoms, cardiovascular symptoms, respiratory symptoms, GI symptoms, musculoskeletal symptoms, skin symptoms, neurologic symptoms, hematologic symptoms, allergic symptoms, psychiatric symptoms  Except any pertinent positives listed.     PAST MEDICAL & SURGICAL HISTORY:  DM (diabetes mellitus)  GERD (gastroesophageal reflux disease)  Glaucoma  Cataracts  DLD  Chronic back pain    Allergies:  IV Contrast (Short breath)  sulfa drugs (hives)    Home Medications:  atorvastatin 20 mg oral tablet: 1 tab(s) orally once a day (at bedtime) (2022 14:56)  meclizine 25 mg oral tablet: 1 tab(s) orally once a day, As needed, Dizziness (2022 14:56)    FAMILY HISTORY:  Mother - HTN, s/p workup for possible endometrial cancer however was negative  Brother - HTN, DM  Sister - DM    SOCIAL HISTORY: Denies cigarette use, alcohol use, or illicit drug use. Remote history of heavy alcohol use.     Vital Signs Last 24 Hrs  T(F): 98.4 (2022 08:41), Max: 98.4 (2022 08:41)  HR: 77 (2022 08:41) (60 - 81)  BP: 126/59 (2022 08:41) (111/58 - 163/62)  RR: 18 (2022 08:41) (17 - 20)  Height (cm): 149.9 (22 @ 15:49)  Weight (kg): 99 (22 @ 15:49)  BMI (kg/m2): 44.1 (22 @ 15:49)  BSA (m2): 1.91 (22 @ 15:49)    UO:     General Appearance - AAOx3, NAD  Heart - S1S2 regular rate and rhythm  Lung - CTA Bilaterally  Abdomen - Soft, nontender, nondistended, no rebound, no rigidity, no guarding, bowel sounds present    GYN/Pelvis:  Labia Majora - Normal  Labia Minora - Minimal postmenopausal atrophy  Clitoris - Normal  Urethra - Normal  Vagina - Normal  Cervix - Normal    Uterus:  Size - 16w in size  Tenderness - None  Mass - Bulky exophytic fibroid noted extending to the right  Mostly mobile    Adnexa:  Masses - None  Tenderness - None    Meds:   acetaminophen     Tablet .. 650 milliGRAM(s) Oral once  heparin   Injectable 8000 Unit(s) IV Push once  hydrocortisone sodium succinate Injectable 100 milliGRAM(s) IV Push once  lactated ringers Bolus 1000 milliLiter(s) IV Bolus once  ondansetron Injectable 4 milliGRAM(s) IV Push once  predniSONE   Tablet 50 milliGRAM(s) Oral every 6 hours    Height (cm): 149.9 (22 @ 15:49)  Weight (kg): 99 (22 @ 15:49)  BMI (kg/m2): 44.1 (22 @ 15:49)  BSA (m2): 1.91 (22 @ 15:49)    LABS:                        11.9   10.29 )-----------( 313      ( 2022 04:30 )             36.3         136  |  101  |  16  ----------------------------<  168<H>  4.3   |  23  |  0.7    Ca    8.8      2022 07:00  Mg     2.1         TPro  7.0  /  Alb  3.7  /  TBili  0.6  /  DBili  x   /  AST  61<H>  /  ALT  79<H>  /  AlkPhos  105      PTT - ( 2022 04:30 )  PTT:61.2 sec      Culture - Urine (collected 22 @ 16:12)  Source: Clean Catch Clean Catch (Midstream)  Final Report (22 @ 09:46):    >=3 organisms. Probable collection contamination.        RADIOLOGY & ADDITIONAL STUDIES:   Chief Complaint: uterine fibroids, LLE DVT    HPI: 72yo P1 postmenopausal since 53yo, with PMHx of DM (noncompliant with medications), glaucoma, cataracts, DLD, GERD, chronic back pain, admitted for LLE DVT s/p thrombectomy, GYN consulted for uterine fibroids noted to compress left iliac vessels and causing right hydronephrosis. Reports she has known about her fibroids since , but no doctor has wanted to operate on her in the past. Reports history of heavy menses (prior to menopause). Receives OBGYN care with Dr. Cruz in [place]. Denies any history of post-menopausal bleeding. Denies HA, CP, SOB, nausea, vomiting, fevers, chills, vaginal discharge, pruritis, urinary symptoms, or changes in bowel habits.     Ob/Gyn History:                   LMP - postmenopausal since 53yo                  Cycle Length - prior to menopause, was q30d, heavy and painful menses  Denies history of ovarian cysts, abnormal paps, or STIs. Known history of multiple calcified uterine fibroids.   Last Pap Smear - , only pap smear she has ever had, reports was negative  Last Mammogram - never, as it is against her cultural beliefs   Last Colonoscopy - never    OBhx:  FT  x1, reports was uncomplicated  eTOP x4, D&C x4    Denies the following: constitutional symptoms, visual symptoms, cardiovascular symptoms, respiratory symptoms, GI symptoms, musculoskeletal symptoms, skin symptoms, neurologic symptoms, hematologic symptoms, allergic symptoms, psychiatric symptoms  Except any pertinent positives listed.     PAST MEDICAL & SURGICAL HISTORY:  DM (diabetes mellitus)  GERD (gastroesophageal reflux disease)  Glaucoma  Cataracts  DLD  Chronic back pain    Allergies:  IV Contrast (Short breath)  sulfa drugs (hives)    Home Medications:  atorvastatin 20 mg oral tablet: 1 tab(s) orally once a day (at bedtime) (2022 14:56)  meclizine 25 mg oral tablet: 1 tab(s) orally once a day, As needed, Dizziness (2022 14:56)    FAMILY HISTORY:  Mother - HTN, s/p workup for possible endometrial cancer however was negative  Brother - HTN, DM  Sister - DM    SOCIAL HISTORY: Denies cigarette use, alcohol use, or illicit drug use. Remote history of heavy alcohol use.     Vital Signs Last 24 Hrs  T(F): 98.4 (2022 08:41), Max: 98.4 (2022 08:41)  HR: 77 (2022 08:41) (60 - 81)  BP: 126/59 (2022 08:41) (111/58 - 163/62)  RR: 18 (2022 08:41) (17 - 20)  Height (cm): 149.9 (22 @ 15:49)  Weight (kg): 99 (22 @ 15:49)  BMI (kg/m2): 44.1 (22 @ 15:49)  BSA (m2): 1.91 (22 @ 15:49)    General Appearance - AAOx3, NAD  Heart - S1S2 regular rate and rhythm  Lung - CTA Bilaterally  Abdomen - Soft, nontender, nondistended, no rebound, no rigidity, no guarding, bowel sounds present    GYN/Pelvis:  Labia Majora - Normal  Labia Minora - Minimal postmenopausal atrophy  Clitoris - Normal  Urethra - Normal  Vagina - Normal  Cervix - Normal    Uterus:  Size - 16w in size  Tenderness - None  Mass - Bulky exophytic fibroid noted extending to the right  Mostly mobile    Adnexa:  Masses - None  Tenderness - None    Meds:   acetaminophen     Tablet .. 650 milliGRAM(s) Oral once  heparin   Injectable 8000 Unit(s) IV Push once  hydrocortisone sodium succinate Injectable 100 milliGRAM(s) IV Push once  lactated ringers Bolus 1000 milliLiter(s) IV Bolus once  ondansetron Injectable 4 milliGRAM(s) IV Push once  predniSONE   Tablet 50 milliGRAM(s) Oral every 6 hours    Height (cm): 149.9 (22 @ 15:49)  Weight (kg): 99 (22 @ 15:49)  BMI (kg/m2): 44.1 (22 @ 15:49)  BSA (m2): 1.91 (22 @ 15:49)    LABS:                        11.9   10.29 )-----------( 313      ( 2022 04:30 )             36.3         136  |  101  |  16  ----------------------------<  168<H>  4.3   |  23  |  0.7    Ca    8.8      2022 07:00  Mg     2.1         TPro  7.0  /  Alb  3.7  /  TBili  0.6  /  DBili  x   /  AST  61<H>  /  ALT  79<H>  /  AlkPhos  105      PTT - ( 2022 04:30 )  PTT:61.2 sec    Culture - Urine (collected 22 @ 16:12)  Source: Clean Catch Clean Catch (Midstream)  Final Report (22 @ 09:46):    >=3 organisms. Probable collection contamination.      RADIOLOGY & ADDITIONAL STUDIES:  < from: US Pelvis Complete (US Pelvis Complete .) (22 @ 11:27) >    ACC: [id number] EXAM:  US PELVIC COMPLETE                          PROCEDURE DATE:  2022        INTERPRETATION:  CLINICAL INFORMATION: Pelvic pain.    LMP: Patient is postmenopausal.    COMPARISON: CT scan dated 2022.    TECHNIQUE:  Transabdominal pelvic sonogram only. Color and Spectral Doppler was   performed.    FINDINGS:    Uterus: 15.2 cm x 10.3 cm x 8.3 cm. Large fibroids are seen, measuring   greater than 8 cm. Areas of calcification is recognized. Please note that   the fibroids obscure the endometrial echo.    Neither ovary is identified.    Fluid: None.    IMPRESSION:  Fibroid uterus.    Unchanged.    --- End of Report ---            DAVID HERNANDEZ MD; Attending Interventional Radiologist  This document has been electronically signed. 2022 12:36PM    < end of copied text >  < from: CT Abdomen and Pelvis No Cont (22 @ 17:21) >    ACC: [id number] EXAM:  CT ABDOMEN AND PELVIS                          PROCEDURE DATE:  2022        INTERPRETATION:  CLINICAL STATEMENT: Lower abdominal pain    TECHNIQUE: Contiguous axial CT images were obtained from the lower chest   to the pubic symphysis without intravenous contrast.  Oral contrast was   not administered.  Reformatted images in the coronal and sagittal planes   were acquired.    COMPARISON/CORRELATION: None available.      FINDINGS:    LOWER CHEST: Unremarkable.    HEPATOBILIARY: No suspicious parenchymal lesions or biliary ductal   dilatation.    SPLEEN: Unremarkable.    PANCREAS: Unremarkable.    ADRENAL GLANDS: Unremarkable.    KIDNEYS: Mild right hydronephrosis extending to pelvic calcified mass    ABDOMINOPELVIC NODES: Unremarkable.    PELVIC ORGANS: Fibroid uterus containing fibroids with coarse   calcifications. Additional 8.4 x 5.9 cm exophytic fibroid.    PERITONEUM/MESENTERY/BOWEL: No evidence of bowel obstruction,   pneumoperitoneum or ascites. Appendix is unremarkable.    BONES/SOFT TISSUES: Inflammatory changes surrounding the left iliac   vessels, limited in evaluation without intravenous contrast. No acute   osseous abnormality.. Bilateral gluteal calcified injection granulomas.      IMPRESSION:    Mild right hydronephrosis extending to bulky uterine fibroids.    Enlarged myometrial uterus, several which are calcified.    Inflammatory changes surrounding the left iliac vessels, limited   evaluation without intravenous contrast. Consider repeat CT with   intravenous contrast as clinically warranted.    --- End of Report ---      SALVADOR WAN MD; Resident Radiologist  This document has been electronically signed.  ELLIOT LANDAU MD; Attending Radiologist  This document has been electronically signed. 2022  5:45PM    < end of copied text >     Chief Complaint: uterine fibroids, LLE DVT    HPI: 70yo P1 postmenopausal since 51yo, with PMHx of DM (noncompliant with medications), glaucoma, cataracts, DLD, GERD, chronic back pain, admitted for LLE DVT s/p thrombectomy, GYN consulted for uterine fibroids possibly compressing left iliac vessels on CT 2022 and causing right hydronephrosis. Reports she has known about her fibroids since , but "no doctor has wanted to operate on her in the past." Reports history of heavy menses (prior to menopause). Receives OBGYN care with Dr. Cruz in [place]. Denies any history of post-menopausal bleeding. Denies HA, CP, SOB, nausea, vomiting, fevers, chills, vaginal discharge, pruritis, urinary symptoms, or changes in bowel habits.     Ob/Gyn History:                   LMP - postmenopausal since 51yo                  Cycle Length - prior to menopause, was q30d, heavy and painful menses  Denies history of ovarian cysts, abnormal paps, or STIs. Known history of multiple calcified uterine fibroids.   Last Pap Smear - , only pap smear she has ever had, reports was negative  Last Mammogram - never, as it is against her cultural beliefs   Last Colonoscopy - never    OBhx:  FT  x1, reports was uncomplicated  eTOP x4, D&C x4    Denies the following: constitutional symptoms, visual symptoms, cardiovascular symptoms, respiratory symptoms, GI symptoms, musculoskeletal symptoms, skin symptoms, neurologic symptoms, hematologic symptoms, allergic symptoms, psychiatric symptoms  Except any pertinent positives listed.     PAST MEDICAL & SURGICAL HISTORY:  DM (diabetes mellitus)  GERD (gastroesophageal reflux disease)  Glaucoma  Cataracts  DLD  Chronic back pain    Allergies:  IV Contrast (Short breath)  sulfa drugs (hives)    Home Medications:  atorvastatin 20 mg oral tablet: 1 tab(s) orally once a day (at bedtime) (2022 14:56)  meclizine 25 mg oral tablet: 1 tab(s) orally once a day, As needed, Dizziness (2022 14:56)    FAMILY HISTORY:  Mother - HTN, s/p workup for possible endometrial cancer however was negative  Brother - HTN, DM  Sister - DM    SOCIAL HISTORY: Denies cigarette use, alcohol use, or illicit drug use. Remote history of heavy alcohol use.     Vital Signs Last 24 Hrs  T(F): 98.4 (2022 08:41), Max: 98.4 (2022 08:41)  HR: 77 (2022 08:41) (60 - 81)  BP: 126/59 (2022 08:41) (111/58 - 163/62)  RR: 18 (2022 08:41) (17 - 20)  Height (cm): 149.9 (22 @ 15:49)  Weight (kg): 99 (22 @ 15:49)  BMI (kg/m2): 44.1 (22 @ 15:49)  BSA (m2): 1.91 (22 @ 15:49)    General Appearance - AAOx3, NAD  Heart - S1S2 regular rate and rhythm  Lung - CTA Bilaterally  Abdomen - Soft, mildly tender, nondistended, no rebound, no rigidity, no guarding, bowel sounds present    GYN/Pelvis:  Labia Majora - Normal  Labia Minora - Minimal postmenopausal atrophy  Clitoris - Normal  Urethra - Normal  Vagina - Minimal postmenopausal atrophy  Cervix - Normal    Uterus: Approximately 16w in size, palpated to be bulky and extending to the right, moderately mobile     Adnexa:  Masses - None  Tenderness - None    Meds:   acetaminophen     Tablet .. 650 milliGRAM(s) Oral once  heparin   Injectable 8000 Unit(s) IV Push once  hydrocortisone sodium succinate Injectable 100 milliGRAM(s) IV Push once  lactated ringers Bolus 1000 milliLiter(s) IV Bolus once  ondansetron Injectable 4 milliGRAM(s) IV Push once  predniSONE   Tablet 50 milliGRAM(s) Oral every 6 hours    Height (cm): 149.9 (22 @ 15:49)  Weight (kg): 99 (22 @ 15:49)  BMI (kg/m2): 44.1 (22 @ 15:49)  BSA (m2): 1.91 (22 @ 15:49)    LABS:                        11.9   10.29 )-----------( 313      ( 2022 04:30 )             36.3     -    136  |  101  |  16  ----------------------------<  168<H>  4.3   |  23  |  0.7    Ca    8.8      2022 07:00  Mg     2.1         TPro  7.0  /  Alb  3.7  /  TBili  0.6  /  DBili  x   /  AST  61<H>  /  ALT  79<H>  /  AlkPhos  105      PTT - ( 2022 04:30 )  PTT:61.2 sec    Culture - Urine (collected 22 @ 16:12)  Source: Clean Catch Clean Catch (Midstream)  Final Report (22 @ 09:46):    >=3 organisms. Probable collection contamination.      RADIOLOGY & ADDITIONAL STUDIES:  < from: US Pelvis Complete (US Pelvis Complete .) (22 @ 11:27) >    ACC: [id number] EXAM:  US PELVIC COMPLETE                          PROCEDURE DATE:  2022        INTERPRETATION:  CLINICAL INFORMATION: Pelvic pain.    LMP: Patient is postmenopausal.    COMPARISON: CT scan dated 2022.    TECHNIQUE:  Transabdominal pelvic sonogram only. Color and Spectral Doppler was   performed.    FINDINGS:    Uterus: 15.2 cm x 10.3 cm x 8.3 cm. Large fibroids are seen, measuring   greater than 8 cm. Areas of calcification is recognized. Please note that   the fibroids obscure the endometrial echo.    Neither ovary is identified.    Fluid: None.    IMPRESSION:  Fibroid uterus.    Unchanged.    --- End of Report ---            DAVID HERNANDEZ MD; Attending Interventional Radiologist  This document has been electronically signed. 2022 12:36PM    < end of copied text >  < from: CT Abdomen and Pelvis No Cont (22 @ 17:21) >    ACC: [id number] EXAM:  CT ABDOMEN AND PELVIS                          PROCEDURE DATE:  2022        INTERPRETATION:  CLINICAL STATEMENT: Lower abdominal pain    TECHNIQUE: Contiguous axial CT images were obtained from the lower chest   to the pubic symphysis without intravenous contrast.  Oral contrast was   not administered.  Reformatted images in the coronal and sagittal planes   were acquired.    COMPARISON/CORRELATION: None available.      FINDINGS:    LOWER CHEST: Unremarkable.    HEPATOBILIARY: No suspicious parenchymal lesions or biliary ductal   dilatation.    SPLEEN: Unremarkable.    PANCREAS: Unremarkable.    ADRENAL GLANDS: Unremarkable.    KIDNEYS: Mild right hydronephrosis extending to pelvic calcified mass    ABDOMINOPELVIC NODES: Unremarkable.    PELVIC ORGANS: Fibroid uterus containing fibroids with coarse   calcifications. Additional 8.4 x 5.9 cm exophytic fibroid.    PERITONEUM/MESENTERY/BOWEL: No evidence of bowel obstruction,   pneumoperitoneum or ascites. Appendix is unremarkable.    BONES/SOFT TISSUES: Inflammatory changes surrounding the left iliac   vessels, limited in evaluation without intravenous contrast. No acute   osseous abnormality.. Bilateral gluteal calcified injection granulomas.      IMPRESSION:    Mild right hydronephrosis extending to bulky uterine fibroids.    Enlarged myometrial uterus, several which are calcified.    Inflammatory changes surrounding the left iliac vessels, limited   evaluation without intravenous contrast. Consider repeat CT with   intravenous contrast as clinically warranted.    --- End of Report ---      SALVADOR WAN MD; Resident Radiologist  This document has been electronically signed.  ELLIOT LANDAU MD; Attending Radiologist  This document has been electronically signed. 2022  5:45PM    < end of copied text >

## 2022-02-09 NOTE — PROGRESS NOTE ADULT - ASSESSMENT
ASSESSMENT:  72 yo F with PMHx of Vertigo, DLD, Chronic back pain and DM noncompliant with medications now s/p mechanical thrombectomy of LLE.    PLAN:  - A/C with Eliquis, bridge with HGTT  - Compression stockings  - F/U with vascular surgery outpatient  - Defer discharge to primary team    Lines/Tubes: CAROL    VASCULAR TEAM SPECTRA: [id number]   ASSESSMENT:  70 yo F with PMHx of Vertigo, DLD, Chronic back pain and DM noncompliant with medications now s/p mechanical thrombectomy of LLE.    PLAN:  - A/C with Eliquis, bridge with HGTT  - Compression stockings on d/c  - F/U with vascular surgery outpatient  - Defer discharge to primary team    Lines/Tubes: CAROL    VASCULAR TEAM SPECTRA: [id number]   ASSESSMENT:  70 yo F with PMHx of Vertigo, DLD, Chronic back pain and DM noncompliant with medications now s/p mechanical thrombectomy of LLE.    PLAN:  - A/C with loading Eliquis 10mg BID for 1 week, bridge with HGTT. Therapeutic A/C to follow loading dose  - Compression stockings on d/c  - F/U with vascular surgery outpatient  - Defer discharge to primary team    Lines/Tubes: PIV    VASCULAR TEAM SPECTRA: [id number]

## 2022-02-09 NOTE — DISCHARGE NOTE PROVIDER - [id number]_GEN_ALL_CORE
Include Location In Plan?: No Detail Level: Detailed Detail Level: Simple This document is complete and the patient is ready for discharge.

## 2022-02-09 NOTE — DISCHARGE NOTE PROVIDER - NSDCCPCAREPLAN_GEN_ALL_CORE_FT
PRINCIPAL DISCHARGE DIAGNOSIS  Diagnosis: Left leg DVT  Assessment and Plan of Treatment: You came into the hospital with left leg swelling and pain. You were found to have a deep vein thrombosis (DVT), which is a clot in the vein of your leg. Vascular surgery did a venogram and removed the clot from your leg. You were treated with a heparin drip to help break down the clot and prevent it from traveling to the lungs. You will be started on Eliquis 10mg twice a day to prevent the clot from reforming. Please follow up with Dr. Saucedo in 2 weeks. If similar symptoms develop again, please return to the Emergency department.      SECONDARY DISCHARGE DIAGNOSES  Diagnosis: Fibroid uterus  Assessment and Plan of Treatment: You were found to have bulky fibroids on imaging when you came to the hospital. These fibroids were partially obstructing your right ureter, causing hydronephrosis, or water accumulation, in your right kidney. GYN recommended .     PRINCIPAL DISCHARGE DIAGNOSIS  Diagnosis: Left leg DVT  Assessment and Plan of Treatment: You came into the hospital with left leg swelling and pain. You were found to have a deep vein thrombosis (DVT), which is a clot in the vein of your leg. Vascular surgery did a venogram and removed the clot from your leg. You were treated with a heparin drip to help break down the clot and prevent it from traveling to the lungs. You will be started on Eliquis 10mg twice a day to prevent the clot from reforming. Please follow up with Dr. Saucedo in 2 weeks. If similar symptoms develop again, please return to the Emergency department.      SECONDARY DISCHARGE DIAGNOSES  Diagnosis: Fibroid uterus  Assessment and Plan of Treatment: You were found to have bulky fibroids on imaging when you came to the hospital. These fibroids were partially obstructing your right ureter, causing hydronephrosis, or water accumulation, in your right kidney. GYN recommended an inpatient hysterectomy but deferred to your preference to do it outpatient. You will meet with them 2/28/22 to discuss pre-operative plans and clearance. If you develop vaginal bleeding or difficulty urinating please return to the hospital.     PRINCIPAL DISCHARGE DIAGNOSIS  Diagnosis: Left leg DVT  Assessment and Plan of Treatment: You came into the hospital with left leg swelling and pain. You were found to have a deep vein thrombosis (DVT), which is a clot in the vein of your leg. Vascular surgery did a venogram and removed the clot from your leg. You were treated with a heparin drip to help break down the clot and prevent it from traveling to the lungs. You will be started on Eliquis 10mg twice a day for 7 days followed by 5mg BID to prevent the clot from reforming. Please follow up with Dr. Saucedo in 2 weeks. If similar symptoms develop again, please return to the Emergency department.      SECONDARY DISCHARGE DIAGNOSES  Diagnosis: Fibroid uterus  Assessment and Plan of Treatment: You were found to have bulky fibroids on imaging when you came to the hospital. These fibroids were partially obstructing your right ureter, causing hydronephrosis, or water accumulation, in your right kidney. GYN recommended an inpatient hysterectomy but deferred to your preference to do it outpatient. You will meet with them 2/28/22 to discuss pre-operative plans and clearance. If you develop vaginal bleeding or difficulty urinating please return to the hospital.

## 2022-02-09 NOTE — DISCHARGE NOTE PROVIDER - CARE PROVIDER_API CALL
Stephanie Harris (MD)  Surgery  [address] [place] [address]  Phone: [phone]  Fax: [phone]  Follow Up Time: 2 weeks   Stephanie Harris)  Surgery  [address] [place] [address]  Phone: [phone]  Fax: [phone]  Follow Up Time: 2 weeks    Graciela Selby)  Internal Medicine  [address] [facility], 1st Floor  [place], [place] [id number]  Phone: [phone]  Fax: [phone]  Follow Up Time:     Devang Martinez)  Obstetrics and Gynecology  [address] [place] [address]  Phone: [phone]  Fax: [phone]  Follow Up Time:

## 2022-02-09 NOTE — DISCHARGE NOTE NURSING/CASE MANAGEMENT/SOCIAL WORK - PATIENT PORTAL LINK FT
You can access the FollowMyHealth Patient Portal offered by [facility] by registering at the following website: [email or web address]. By joining [facility]’s FollowMyHealth portal, you will also be able to view your health information using other applications (apps) compatible with our system.

## 2022-02-09 NOTE — CONSULT NOTE ADULT - ASSESSMENT
70yo P1 postmenopausal, PMHx of DM (noncompliant with meds), glaucoma, cataracts, DLD, GERD, chronic back pain, admitted for LLE DVT s/p thrombectomy, s/p heparin drip to be started on Eliquis with known calcified uterine fibroids largest measuring 9cm now compressing left iliac vessels and with right hydronephrosis, clinically and hemodynamically stable.   -Recommend TVUS, as only TAUS was completed on this admission  -Obtain medical records from patient's OBGYN (Dr. Cruz); patient reports she has all her medical records at home and her daughter is on the way to the hospital and will bring them with her. If unable to do so, consent obtained for medical release    Dr Devlin and Dr Richmond to be made aware.    THIS NOTE IS INCOMPLETE ******************* 70yo P1 postmenopausal, PMHx of DM (noncompliant with meds), glaucoma, cataracts, DLD, GERD, chronic back pain, admitted for LLE DVT s/p thrombectomy, s/p heparin drip to be started on Eliquis, with known calcified uterine fibroids largest measuring 9cm suspected to be compressing left iliac vessels and with right hydronephrosis, clinically and hemodynamically stable.   -Recommend TVUS, as only TAUS was completed on this admission  -Obtain medical records from patient's OBGYN (Dr. Cruz); patient reports she has all her medical records at home and her daughter is on the way to the hospital and will bring them with her. If unable to do so, consent obtained for medical release    Dr Devlin and Dr Richmond to be made aware.    THIS NOTE IS INCOMPLETE ******************* 72yo P1 postmenopausal, PMHx of DM (noncompliant with meds), glaucoma, cataracts, DLD, GERD, chronic back pain, admitted for LLE DVT s/p thrombectomy, s/p heparin drip to be started on Eliquis, with known calcified uterine fibroids largest measuring 9cm suspected to be compressing left iliac vessels and with right hydronephrosis, clinically and hemodynamically stable.   -Recommend TVUS, as only TAUS was completed on this admission  -Obtain medical records from patient's OBGYN (Dr. Cruz); patient reports she has all her medical records at home and her daughter is on the way to the hospital and will bring them with her. If unable to do so, consent obtained for medical release  -Plan for surgery    Dr Devlin and Dr Richmond aware.   70yo P1 postmenopausal, PMHx of DM (noncompliant with meds), glaucoma, cataracts, DLD, GERD, chronic back pain, admitted for LLE DVT s/p thrombectomy, s/p heparin drip to be started on Eliquis, with known calcified uterine fibroids largest measuring 9cm suspected to be compressing left iliac vessels and with right hydronephrosis, clinically and hemodynamically stable.   -Recommend TVUS, as only TAUS was completed on this admission.  -Obtain medical records from patient's OBGYN (Dr. Cruz); patient reports she has all her medical records at home and her daughter is on the way to the hospital and will bring them with her. If unable to do so, consent obtained for medical release.  -Plan for surgery (hysterectomy), robotic versus open. Will need medical and cardiac clearances. Will also need to transition to heparin drip and discontinue Eliquis prior to surgery.     Dr Devlin and Dr Richmond aware.

## 2022-02-09 NOTE — PHYSICAL THERAPY INITIAL EVALUATION ADULT - PERTINENT HX OF CURRENT PROBLEM, REHAB EVAL
70 yo F with PMHx of Vertigo, DLD, Chronic back pain and DM noncompliant with medications because she reads articles about their side effects and does not want to take them presents to the ED for evaluation of left calf pain radiating up to the LLQ x 2 days.

## 2022-02-09 NOTE — DISCHARGE NOTE NURSING/CASE MANAGEMENT/SOCIAL WORK - NSDCPEFALRISK_GEN_ALL_CORE
For information on Fall & Injury Prevention, visit: [email or web address] OR  [email or web address] OR  https://www.cdc.gov/steadi/patient.html

## 2022-02-09 NOTE — DISCHARGE NOTE PROVIDER - HOSPITAL COURSE
72 yo F with PMHx of Vertigo, DLD, and DM noncompliant with medications because she reads articles about their side effects and does not want to take them presents to the ED for evaluation of left calf pain radiating up to the LLQ x 2 days found to have L calf DVT.     # L calf DVT   - B/l LE duplex c/w L dvt  - Hep gtt with PTT ATC (goal 60-80), f/u PPTs   - Monitor for signs of bleed  - Pain control   - Vascular venogram + possible thrombectomy today  - PT eval after venogram  - Vascular recommends A/C with loading Eliquis 10mg BID for 1 week, bridge with HGTT. Therapeutic A/C to follow loading dose    # Chronic back pain  - Pain control  - PT eval  - Continue w/u with Dr Lang as OP    # Calcified enlarged myometrium   # Bulky leiomyomas  # LLQ abd pain   - Physical with tense and tender pelvic area  - CT A/P noncont: Mild R hydro extending to bulky uterine fibroids. Enlarged myometrial uterus, several which are calcified. Inflammatory changes surrounding the left iliac vessels, limited evaluation without intravenous contrast. Consider repeat CT with intravenous contrast as clinically warranted  - Pt refuses contrast initially however no agreeable, will pre-tx w/ prednisone and benadryl as per vacular   - pain control with tylenol, percocet   - OB consult --> pt hasn't followed up in many years with Gyn and has had increasing bulky sensation, pain, and R hydronephrosis  - pelvic sono showed fibroid uterus    # DLD   - Off meds, will order Atorvastatin 20mg po Qd  - lipid profile , total 223    # DM  - Monitor FS  - Start basal bolus if FS persistently >180  - F/u A1c     # Vertigo  - Was rx Meclizine unsure of dose  - Start Meclizine 25mg PO PRN     DVT ppx: hep gtt  GI ppx: None  Diet: Carb consistent  Activity: IAT  Dispo: Acute    70 yo F with PMHx of Vertigo, DLD, and DM noncompliant with medications because she reads articles about their side effects and does not want to take them presents to the ED for evaluation of left calf pain radiating up to the LLQ x 2 days found to have L calf DVT.     # L calf DVT   - B/l LE duplex c/w L dvt  - s/p thrombectomy   - Vascular recommends A/C with loading Eliquis 10mg BID for 1 week    # Chronic back pain  - Pain control  - Continue w/u with Dr Lang as OP    # Calcified enlarged myometrium   # Bulky leiomyomas  # LLQ abd pain   - Physical with tense and tender pelvic area  - CT A/P noncont: Mild R hydro extending to bulky uterine fibroids. Enlarged myometrial uterus, several which are calcified. Inflammatory changes surrounding the left iliac vessels, limited evaluation without intravenous contrast. Consider repeat CT with intravenous contrast as clinically warranted  - Pt refuses contrast initially however no agreeable, will pre-tx w/ prednisone and benadryl as per vacular   - pain control with tylenol, percocet   - OB consult --> pt hasn't followed up in many years with Gyn and has had increasing bulky sensation, pain, and R hydronephrosis  - pelvic sono showed fibroid uterus     70 yo F with PMHx of Vertigo, DLD, and DM noncompliant with medications because she reads articles about their side effects and does not want to take them presents to the ED for evaluation of left calf pain radiating up to the LLQ x 2 days found to have L calf DVT.     # L calf DVT   - B/l LE duplex c/w L dvt  - s/p thrombectomy   - Vascular recommends A/C with loading Eliquis 10mg BID for 1 week    # Chronic back pain  - Pain control  - Continue w/u with Dr Lang as OP    # Calcified enlarged myometrium   # Bulky leiomyomas  # LLQ abd pain   follow up dr becerra for further management , scheduled for pre op at end of month     70 yo F with PMHx of Vertigo, DLD, and DM noncompliant with medications because she reads articles about their side effects and does not want to take them presents to the ED for evaluation of left calf pain radiating up to the LLQ x 2 days found to have L calf DVT.     # L calf DVT   - B/l LE duplex c/w L dvt  - Hep gtt with PTT ATC (goal 60-80), f/u PPTs   - Monitor for signs of bleed  - Pain control   - Vascular venogram + possible thrombectomy today  - PT eval after venogram  - Vascular recommends A/C with loading Eliquis 10mg BID for 1 week, bridge with HGTT. Therapeutic A/C to follow loading dose.  - Pt resumed heparin drip in order to go for hysterectomy tomorrow.    # Calcified enlarged myometrium   # Bulky leiomyomas  # LLQ abd pain   - Physical with tense and tender pelvic area  - CT A/P noncont: Mild R hydro extending to bulky uterine fibroids. Enlarged myometrial uterus, several which are calcified. Inflammatory changes surrounding the left iliac vessels, limited evaluation without intravenous contrast. Consider repeat CT with intravenous contrast as clinically warranted  - Pt refuses contrast initially however no agreeable, will pre-tx w/ prednisone and benadryl as per vacular   - pain control with tylenol, percocet   - OB consult --> pt hasn't followed up in many years with Gyn and has had increasing bulky sensation, pain, and R hydronephrosis  - pelvic sono showed fibroid uterus. Largest one measures 8cm. Previous US from 2021 showed largest fibroid at 5.5 cm.  - OB will do a hysterectomy outpatient in March bc pt is feeling weak at this time. No cardiac history or medications. TVUS not necessary after CT and TAUS, as window for TVUS is 7cm and largest fibroid is 8cm.    # Spasms  - Pt has had them prior to admission, happened again last night.   - not continuous and pt is able to remember them. No hx of seizures  - will monitor    # Chronic back pain  - Pain control  - PT eval  - Continue w/u with Dr Lang as OP    # DLD   - Off meds, will order Atorvastatin 20mg po Qd  - lipid profile , total 223    # DM  - Monitor FS  - Start basal bolus if FS persistently >180  - A1c 6.3    # Vertigo  - Was rx Meclizine unsure of dose  - Start Meclizine 25mg PO PRN     DVT ppx: hep gtt  GI ppx: None  Diet: Carb consistent  Activity: IAT  Dispo: Acute    70 yo F with PMHx of Vertigo, DLD, and DM noncompliant with medications because she reads articles about their side effects and does not want to take them presents to the ED for evaluation of left calf pain radiating up to the LLQ x 2 days found to have L calf DVT.     # L calf DVT   - B/l LE duplex c/w L dvt  - s/p Hep gtt   - s/p thrombectomy  - Vascular recommends A/C with loading Eliquis 10mg BID for 1 week, bridge with HGTT. Therapeutic A/C to follow loading dose.  - F/U with vascular surgery outpatient      # Calcified enlarged myometrium   # Bulky leiomyomas  # LLQ abd pain   - Physical with tense and tender pelvic area  - CT A/P noncont: Mild R hydro extending to bulky uterine fibroids. Enlarged myometrial uterus, several which are calcified. Inflammatory changes surrounding the left iliac vessels, limited evaluation without intravenous contrast. Consider repeat CT with intravenous contrast as clinically warranted  - Pt refuses contrast initially however no agreeable, will pre-tx w/ prednisone and benadryl as per vacular   - pain control with tylenol, percocet   - OB consult --> pt hasn't followed up in many years with Gyn and has had increasing bulky sensation, pain, and R hydronephrosis  - pelvic sono showed fibroid uterus. Largest one measures 8cm. Previous US from 2021 showed largest fibroid at 5.5 cm.  - OB will do a hysterectomy outpatient in March bc pt is feeling weak at this time. No cardiac history or medications. TVUS not necessary after CT and TAUS, as window for TVUS is 7cm and largest fibroid is 8cm.

## 2022-02-09 NOTE — CONSULT NOTE ADULT - ATTENDING COMMENTS
Venous duplex was reviewed- LLE DVT from EIV down to leg.  CT is done without contrast- difficult to interpret, but IVC seems ot be free of thrombus.  Scheduled for venogram and possible thrombectomy- need to clarify the patient's concern regarding IV contrast.
See note from 2/10/2022.

## 2022-02-09 NOTE — DISCHARGE NOTE NURSING/CASE MANAGEMENT/SOCIAL WORK - NSCORESITESY/N_GEN_A_CORE_RD
Subjective:       Patient ID: Pietro Whatley is a 55 y.o. male.    Vitals:  weight is 96.2 kg (212 lb). His tympanic temperature is 97.6 °F (36.4 °C). His blood pressure is 127/84 and his pulse is 77. His oxygen saturation is 96%.     Chief Complaint: Arm Injury    Patient has bruise on his left arm that he noticed on Monday, and started with diarrhea Monday night.       Arm Injury    The incident occurred 3 to 5 days ago. There was no injury mechanism. The pain is present in the left forearm. The pain is at a severity of 0/10. The patient is experiencing no pain. Pertinent negatives include no chest pain. Nothing aggravates the symptoms. He has tried nothing for the symptoms. The treatment provided no relief.     Review of Systems   Constitution: Negative for chills and fever.   HENT: Negative for sore throat.    Eyes: Negative for blurred vision.   Cardiovascular: Negative for chest pain.   Respiratory: Negative for shortness of breath.    Skin: Negative for rash.   Musculoskeletal: Negative for back pain and joint pain.   Gastrointestinal: Positive for abdominal pain and diarrhea. Negative for nausea and vomiting.   Neurological: Negative for headaches.   Psychiatric/Behavioral: The patient is not nervous/anxious.        Objective:      Physical Exam   Constitutional: He is oriented to person, place, and time. He appears well-developed and well-nourished.   HENT:   Head: Normocephalic and atraumatic.   Right Ear: External ear normal.   Nose: Nose normal.   Eyes: Conjunctivae, EOM and lids are normal. Pupils are equal, round, and reactive to light.   Neck: Trachea normal, normal range of motion, full passive range of motion without pain and phonation normal. Neck supple.   Cardiovascular: Normal rate.    Pulmonary/Chest: Effort normal.   Musculoskeletal: Normal range of motion.   Neurological: He is alert and oriented to person, place, and time.   Skin: Skin is warm, dry and intact. Bruising (not tender, flat non  blanching) noted.        Psychiatric: He has a normal mood and affect. His speech is normal and behavior is normal. Judgment and thought content normal. Cognition and memory are normal.   Nursing note and vitals reviewed.      Assessment:       1. Contusion of left upper extremity, initial encounter    2. Bruising        Plan:         Contusion of left upper extremity, initial encounter    Bruising  -     CBC W/ AUTO DIFFERENTIAL; Future; Expected date: 02/21/2018  -     LYME DISEASE ANTIBODY BY EIA; Future; Expected date: 02/21/2018           No

## 2022-02-09 NOTE — DISCHARGE NOTE PROVIDER - NSDCMRMEDTOKEN_GEN_ALL_CORE_FT
atorvastatin 20 mg oral tablet: 1 tab(s) orally once a day (at bedtime)  meclizine 25 mg oral tablet: 1 tab(s) orally once a day, As needed, Dizziness   atorvastatin 20 mg oral tablet: 1 tab(s) orally once a day (at bedtime)  Eliquis 5 mg oral tablet: 1 tab(s) orally 2 times a day. Start 2/17/2022   Eliquis 5 mg oral tablet: 2 tab(s) orally 2 times a day for 7 days (END : 02/16/2022)  meclizine 25 mg oral tablet: 1 tab(s) orally once a day, As needed, Dizziness   atorvastatin 20 mg oral tablet: 1 tab(s) orally once a day (at bedtime)  Eliquis 5 mg oral tablet: 1 tab(s) orally 2 times a day. Start 2/17/2022   Eliquis 5 mg oral tablet: 2 tab(s) orally 2 times a day for 7 days then 1 tab q12h  meclizine 25 mg oral tablet: 1 tab(s) orally once a day, As needed, Dizziness   atorvastatin 20 mg oral tablet: 1 tab(s) orally once a day (at bedtime)  Eliquis 5 mg oral tablet: 1 tab(s) orally 2 times a day. Start 2/17/2022   Eliquis 5 mg oral tablet: 1 tab(s) orally 2 times a day  Eliquis 5 mg oral tablet: 2 tab(s) orally 2 times a day UNTIL 2/16/22 then 1 tab q12h  meclizine 25 mg oral tablet: 1 tab(s) orally once a day, As needed, Dizziness

## 2022-02-09 NOTE — PHYSICAL THERAPY INITIAL EVALUATION ADULT - GENERAL OBSERVATIONS, REHAB EVAL
nurse BART Cobos notified PT patient on hold and not allowed to be OOB d/t DVT on LLE. Hold eval until patient is medically stable.
Pt encountered in semi-lovett position in bed in AD, +IV, c/o Lt foot pain 2/10 scale and agreeable with PT. Pt performed bed mobillity with Min A, transfer mobility Min A and ambulated 5 ft Min A using RW. Dr Lainez advised light therapy at this time due to pt  cont on hep drip. Pt left in bed as found in NAD.

## 2022-02-09 NOTE — DISCHARGE NOTE PROVIDER - NSDCFUSCHEDAPPT_GEN_ALL_CORE_FT
RENEA POWERS ; 02/18/2022 ; [facility] [address] ; 03/29/2022 ; [facility] Neurology [address] RENEA POWERS ; 02/18/2022 ; [facility] [address] ; 02/28/2022 ; [facility] [address] RENEA FIGUEROA ; 03/29/2022 ; [facility] Neurology [address]

## 2022-02-09 NOTE — PROGRESS NOTE ADULT - ASSESSMENT
70 yo F with PMHx of Vertigo, DLD, Chronic back pain and DM noncompliant with medications because she reads articles about their side effects and does not want to take them presents to the ED for evaluation of left calf pain radiating up to the LLQ x 2 days.    # Left lower ext DVT   sp thrombectomy   ok to switch to eliquis on dc   maintain on heparin gtt for now     # Enlarged myometrial uterus secondary to  Bulky  uterine fibroids  cardiology clearance  transvaginal ultrasound   plan for operative intevention     #Drug monitoring of high risk medication , potential for drug drug reaction , requiring close monitoring monitor ptt and adjust heparin   PTT - ( 09 Feb 2022 04:30 )  PTT:61.2 sec    # Right Hydronephrosis sec to enlarged uteruus    # DM type2  POCT Blood Glucose.: 124 mg/dL (09 Feb 2022 11:12)  controlled    # Chronic back pain probably se to bulky uterus/ radiculopathy controlled    # Vertigo  - c/w meclizine    # Dyslipidemia  - statin    PROGRESS NOTE HANDOFF    Pending: transvaginal ultrasound , cardiology evaluation     Family discussion: patient verbalized understanding and agreeable to plan of care     Disposition: Home

## 2022-02-09 NOTE — DISCHARGE NOTE NURSING/CASE MANAGEMENT/SOCIAL WORK - NSDCPEELIQUISREACT_GEN_ALL_CORE
fingers/toes warm to touch/capillary refill time < 2 seconds/no paresthesia/no cyanosis of extremity/no swelling Apixaban/Eliquis increases your risk for bleeding. Notify your doctor if you experience any of the following side effects: bleeding, coughing or vomiting blood, red or black stool, unexpected pain or swelling, itching or hives, chest pain, chest tightness, trouble breathing, changes in how much or how often you urinate, red or pink urine, numbness or tingling in your feet, or unusual muscle weakness. When Apixaban/Eliquis is taken with other medicines, they can affect how it works. Taking other medications such as aspirin, blood thinners, nonsteroidal anti-inflammatories, and medications that treat depression can increase your risk of bleeding. It is very important to tell your health care provider about all of the other medicines, including over-the-counter medications, herbs, and vitamins you are taking. DO NOT start, stop, or change the dosage of any medicine, including over-the-counter medicines, vitamins, and herbal products without your doctor’s approval. Any products containing aspirin or are nonsteroidal anti-inflammatories lessen the blood’s ability to form clots and add to the effect of Apixaban/Eliquis. Never take aspirin or medicines that contain aspirin without speaking to your doctor.

## 2022-02-09 NOTE — DISCHARGE NOTE PROVIDER - PROVIDER TOKENS
PROVIDER:[TOKEN:[[id number]],FOLLOWUP:[2 weeks]] PROVIDER:[TOKEN:[[id number]],FOLLOWUP:[2 weeks]],PROVIDER:[TOKEN:[[id number]]],PROVIDER:[TOKEN:[[id number]]]

## 2022-02-09 NOTE — PROGRESS NOTE ADULT - ASSESSMENT
70 yo F with PMHx of Vertigo, DLD, and DM noncompliant with medications because she reads articles about their side effects and does not want to take them presents to the ED for evaluation of left calf pain radiating up to the LLQ x 2 days found to have L calf DVT.     # L calf DVT   - B/l LE duplex c/w L dvt  - Hep gtt with PTT ATC (goal 60-80), f/u PPTs   - Monitor for signs of bleed  - Pain control   - Vascular venogram + possible thrombectomy today  - PT eval after venogram    # Chronic back pain  - Pain control  - PT eval  - Continue w/u with Dr Lang as OP    # Calcified enlarged myometrium   # Bulky leiomyomas  # LLQ abd pain   - Physical with tense and tender pelvic area  - CT A/P noncont: Mild R hydro extending to bulky uterine fibroids. Enlarged myometrial uterus, several which are calcified. Inflammatory changes surrounding the left iliac vessels, limited evaluation without intravenous contrast. Consider repeat CT with intravenous contrast as clinically warranted  - Pt refuses contrast initially however no agreeable, will pre-tx w/ prednisone and benadryl as per vacular   - pain control with tylenol, percocet   - OB consult --> pt hasn't followed up in many years with Gyn and has had increasing bulky sensation, pain, and R hydronephrosis  - pelvic sono showed fibroid uterus    # DLD   - Off meds, will order Atorvastatin 20mg po Qd  - lipid profile , total 223    # DM  - Monitor FS  - Start basal bolus if FS persistently >180  - F/u A1c     # Vertigo  - Was rx Meclizine unsure of dose  - Start Meclizine 25mg PO PRN     DVT ppx: hep gtt  GI ppx: None  Diet: Carb consistent  Activity: IAT  Dispo: Acute      70 yo F with PMHx of Vertigo, DLD, and DM noncompliant with medications because she reads articles about their side effects and does not want to take them presents to the ED for evaluation of left calf pain radiating up to the LLQ x 2 days found to have L calf DVT.     # L calf DVT   - B/l LE duplex c/w L dvt  - Hep gtt with PTT ATC (goal 60-80), f/u PPTs   - Monitor for signs of bleed  - Pain control   - Vascular venogram + possible thrombectomy today  - PT eval after venogram  - Vascular recommends A/C with loading Eliquis 10mg BID for 1 week, bridge with HGTT. Therapeutic A/C to follow loading dose    # Chronic back pain  - Pain control  - PT eval  - Continue w/u with Dr Lang as OP    # Calcified enlarged myometrium   # Bulky leiomyomas  # LLQ abd pain   - Physical with tense and tender pelvic area  - CT A/P noncont: Mild R hydro extending to bulky uterine fibroids. Enlarged myometrial uterus, several which are calcified. Inflammatory changes surrounding the left iliac vessels, limited evaluation without intravenous contrast. Consider repeat CT with intravenous contrast as clinically warranted  - Pt refuses contrast initially however no agreeable, will pre-tx w/ prednisone and benadryl as per vacular   - pain control with tylenol, percocet   - OB consult --> pt hasn't followed up in many years with Gyn and has had increasing bulky sensation, pain, and R hydronephrosis  - pelvic sono showed fibroid uterus    # DLD   - Off meds, will order Atorvastatin 20mg po Qd  - lipid profile , total 223    # DM  - Monitor FS  - Start basal bolus if FS persistently >180  - F/u A1c     # Vertigo  - Was rx Meclizine unsure of dose  - Start Meclizine 25mg PO PRN     DVT ppx: hep gtt  GI ppx: None  Diet: Carb consistent  Activity: IAT  Dispo: Acute

## 2022-02-10 LAB
APTT BLD: 36.5 SEC — SIGNIFICANT CHANGE UP (ref 27–39.2)
APTT BLD: 44.2 SEC — HIGH (ref 27–39.2)
APTT BLD: 82.9 SEC — CRITICAL HIGH (ref 27–39.2)
GLUCOSE BLDC GLUCOMTR-MCNC: 102 MG/DL — HIGH (ref 70–99)
GLUCOSE BLDC GLUCOMTR-MCNC: 108 MG/DL — HIGH (ref 70–99)
GLUCOSE BLDC GLUCOMTR-MCNC: 93 MG/DL — SIGNIFICANT CHANGE UP (ref 70–99)
GLUCOSE BLDC GLUCOMTR-MCNC: 94 MG/DL — SIGNIFICANT CHANGE UP (ref 70–99)
HCT VFR BLD CALC: 36.6 % — LOW (ref 37–47)
HGB BLD-MCNC: 11.8 G/DL — LOW (ref 12–16)
MCHC RBC-ENTMCNC: 28.9 PG — SIGNIFICANT CHANGE UP (ref 27–31)
MCHC RBC-ENTMCNC: 32.2 G/DL — SIGNIFICANT CHANGE UP (ref 32–37)
MCV RBC AUTO: 89.5 FL — SIGNIFICANT CHANGE UP (ref 81–99)
NRBC # BLD: 0 /100 WBCS — SIGNIFICANT CHANGE UP (ref 0–0)
PLATELET # BLD AUTO: 287 K/UL — SIGNIFICANT CHANGE UP (ref 130–400)
RBC # BLD: 4.09 M/UL — LOW (ref 4.2–5.4)
RBC # FLD: 13.2 % — SIGNIFICANT CHANGE UP (ref 11.5–14.5)
SURGICAL PATHOLOGY STUDY: SIGNIFICANT CHANGE UP
WBC # BLD: 6.88 K/UL — SIGNIFICANT CHANGE UP (ref 4.8–10.8)
WBC # FLD AUTO: 6.88 K/UL — SIGNIFICANT CHANGE UP (ref 4.8–10.8)

## 2022-02-10 PROCEDURE — 99232 SBSQ HOSP IP/OBS MODERATE 35: CPT

## 2022-02-10 RX ORDER — APIXABAN 2.5 MG/1
10 TABLET, FILM COATED ORAL EVERY 12 HOURS
Refills: 0 | Status: DISCONTINUED | OUTPATIENT
Start: 2022-02-10 | End: 2022-02-11

## 2022-02-10 RX ORDER — APIXABAN 2.5 MG/1
2 TABLET, FILM COATED ORAL
Qty: 120 | Refills: 0
Start: 2022-02-10 | End: 2022-03-11

## 2022-02-10 RX ADMIN — APIXABAN 10 MILLIGRAM(S): 2.5 TABLET, FILM COATED ORAL at 17:49

## 2022-02-10 RX ADMIN — HEPARIN SODIUM 13 UNIT(S)/HR: 5000 INJECTION INTRAVENOUS; SUBCUTANEOUS at 00:23

## 2022-02-10 RX ADMIN — HEPARIN SODIUM 12 UNIT(S)/HR: 5000 INJECTION INTRAVENOUS; SUBCUTANEOUS at 11:05

## 2022-02-10 RX ADMIN — ATORVASTATIN CALCIUM 20 MILLIGRAM(S): 80 TABLET, FILM COATED ORAL at 21:11

## 2022-02-10 RX ADMIN — CHLORHEXIDINE GLUCONATE 1 APPLICATION(S): 213 SOLUTION TOPICAL at 05:05

## 2022-02-10 NOTE — PROGRESS NOTE ADULT - ASSESSMENT
72 yo F with PMHx of Vertigo, DLD, Chronic back pain and DM noncompliant with medications because she reads articles about their side effects and does not want to take them presents to the ED for evaluation of left calf pain radiating up to the LLQ x 2 days.    # Left lower ext DVT   sp thrombectomy   ok to switch to eliquis on dc   maintain on heparin gtt for now     # Enlarged myometrial uterus secondary to  Bulky  uterine fibroids  due to lack of cardiac disease - no cardiac clearance needed  medical clearance per beth criteria patient is class 2 risk , patient is low-mod for mod high risk surgery given intrabdominal location     #Drug monitoring of high risk medication , potential for drug drug reaction , requiring close monitoring monitor ptt and adjust heparin   PTT - ( 10 Feb 2022 04:30 )  PTT:82.9 sec    # Right Hydronephrosis sec to enlarged uteruus    # DM type2  POCT Blood Glucose.: 108 mg/dL (10 Feb 2022 11:09)  POCT Blood Glucose.: 93 mg/dL (10 Feb 2022 07:32)  controlled    # Chronic back pain probably se to bulky uterus/ radiculopathy controlled    # Vertigo  - c/w meclizine    # Dyslipidemia  - statin    PROGRESS NOTE HANDOFF    Pendin gyn plan for operative intervnetion    Family discussion: patient verbalized understanding and agreeable to plan of care     Disposition: Home

## 2022-02-10 NOTE — CHART NOTE - NSCHARTNOTEFT_GEN_A_CORE
PGY1 Note    Patient seen and examined at bedside with Dr. Martinez. Discussed with patient that a minimally invasive hysterectomy (robotic) is recommended. Patient desires surgery for management of fibroids and is agreeable with the plan. Discussed R/B/A and patient will follow up outpatient. She will schedule a preop appointment with him  at the office on [address] on 2/28/22 and will then schedule for a surgery in early March.     Spoke with medicine team regarding patient's anticoagulant requires. She will be discharged home on Eliquis for 3 months and per Medicine team, ok to hold for up to 5 days preop. Will reconsult vascular team closer to surgery.     Dr Martinez at bedside, and Dr Devlin aware. PGY1 Note    Patient seen and examined at bedside with Dr. Martinez. Discussed with patient that a minimally invasive hysterectomy (robotic) is recommended. Patient desires surgery for management of fibroids and is agreeable with the plan. Discussed R/B/A and patient will follow up outpatient. She will schedule a preop appointment with him  at the office on [address] [facility] on 2/28/22 and will then schedule for a surgery in early March.     Spoke with medicine team regarding patient's anticoagulant requires. She will be discharged home on Eliquis for 3 months and per Medicine team, ok to hold for up to 5 days preop. Will reconsult vascular team closer to surgery.     Dr Martinez at bedside, and Dr Devlin aware.    Attending:    Patient seen and evaluated. Agree with above note.   I counselled her that she is a candidate for minimally invasive surgery. I am recommending a robotic assisted total laparscopic hysterectomy, bilateral salpingo-oopherectomy.  She will need to be off anticougulation.  The fibroid is likely contributing factor for her to develop a DVT.  Patient also told that there is a small chance of her fibroids being a uterine sarcoma. Given that she has a long standing history of fibroids with calcifications - the likelihood of sarcoma is extremely  low.    I suspect that she will undergo surgery in early to mid march.

## 2022-02-10 NOTE — PROGRESS NOTE ADULT - ASSESSMENT
72 yo F with PMHx of Vertigo, DLD, and DM noncompliant with medications because she reads articles about their side effects and does not want to take them presents to the ED for evaluation of left calf pain radiating up to the LLQ x 2 days found to have L calf DVT.     # L calf DVT   - B/l LE duplex c/w L dvt  - Hep gtt with PTT ATC (goal 60-80), f/u PPTs   - Monitor for signs of bleed  - Pain control   - Vascular venogram + possible thrombectomy today  - PT eval after venogram  - Vascular recommends A/C with loading Eliquis 10mg BID for 1 week, bridge with HGTT. Therapeutic A/C to follow loading dose.  - Pt resumed heparin drip in order to go for hysterectomy tomorrow.    # Calcified enlarged myometrium   # Bulky leiomyomas  # LLQ abd pain   - Physical with tense and tender pelvic area  - CT A/P noncont: Mild R hydro extending to bulky uterine fibroids. Enlarged myometrial uterus, several which are calcified. Inflammatory changes surrounding the left iliac vessels, limited evaluation without intravenous contrast. Consider repeat CT with intravenous contrast as clinically warranted  - Pt refuses contrast initially however no agreeable, will pre-tx w/ prednisone and benadryl as per vacular   - pain control with tylenol, percocet   - OB consult --> pt hasn't followed up in many years with Gyn and has had increasing bulky sensation, pain, and R hydronephrosis  - pelvic sono showed fibroid uterus. Largest one measures 8cm. Previous US from 2021 showed largest fibroid at 5.5 cm.  - OB will do a hysterectomy likely tomorrow. No cardiac history or medications. TVUS not necessary after CT and TAUS, as window for TVUS is 7cm and largest fibroid is 8cm.    # Spasms  - Pt has had them prior to admission, happened again last night.   - not continuous and pt is able to remember them. No hx of seizures  - will monitor    # Chronic back pain  - Pain control  - PT eval  - Continue w/u with Dr Lang as OP    # DLD   - Off meds, will order Atorvastatin 20mg po Qd  - lipid profile , total 223    # DM  - Monitor FS  - Start basal bolus if FS persistently >180  - A1c 6.3    # Vertigo  - Was rx Meclizine unsure of dose  - Start Meclizine 25mg PO PRN     DVT ppx: hep gtt  GI ppx: None  Diet: Carb consistent  Activity: IAT  Dispo: Acute

## 2022-02-11 VITALS
HEART RATE: 61 BPM | SYSTOLIC BLOOD PRESSURE: 134 MMHG | RESPIRATION RATE: 18 BRPM | DIASTOLIC BLOOD PRESSURE: 74 MMHG | TEMPERATURE: 98 F

## 2022-02-11 LAB
ANION GAP SERPL CALC-SCNC: 12 MMOL/L — SIGNIFICANT CHANGE UP (ref 7–14)
APTT BLD: 34.9 SEC — SIGNIFICANT CHANGE UP (ref 27–39.2)
APTT BLD: 35.8 SEC — SIGNIFICANT CHANGE UP (ref 27–39.2)
BUN SERPL-MCNC: 13 MG/DL — SIGNIFICANT CHANGE UP (ref 10–20)
CALCIUM SERPL-MCNC: 8.9 MG/DL — SIGNIFICANT CHANGE UP (ref 8.5–10.1)
CHLORIDE SERPL-SCNC: 106 MMOL/L — SIGNIFICANT CHANGE UP (ref 98–110)
CO2 SERPL-SCNC: 27 MMOL/L — SIGNIFICANT CHANGE UP (ref 17–32)
CREAT SERPL-MCNC: 0.6 MG/DL — LOW (ref 0.7–1.5)
GLUCOSE BLDC GLUCOMTR-MCNC: 107 MG/DL — HIGH (ref 70–99)
GLUCOSE BLDC GLUCOMTR-MCNC: 126 MG/DL — HIGH (ref 70–99)
GLUCOSE SERPL-MCNC: 118 MG/DL — HIGH (ref 70–99)
HCT VFR BLD CALC: 35.8 % — LOW (ref 37–47)
HGB BLD-MCNC: 11.9 G/DL — LOW (ref 12–16)
MAGNESIUM SERPL-MCNC: 2 MG/DL — SIGNIFICANT CHANGE UP (ref 1.8–2.4)
MCHC RBC-ENTMCNC: 29.2 PG — SIGNIFICANT CHANGE UP (ref 27–31)
MCHC RBC-ENTMCNC: 33.2 G/DL — SIGNIFICANT CHANGE UP (ref 32–37)
MCV RBC AUTO: 88 FL — SIGNIFICANT CHANGE UP (ref 81–99)
NRBC # BLD: 0 /100 WBCS — SIGNIFICANT CHANGE UP (ref 0–0)
PLATELET # BLD AUTO: 297 K/UL — SIGNIFICANT CHANGE UP (ref 130–400)
POTASSIUM SERPL-MCNC: 4.1 MMOL/L — SIGNIFICANT CHANGE UP (ref 3.5–5)
POTASSIUM SERPL-SCNC: 4.1 MMOL/L — SIGNIFICANT CHANGE UP (ref 3.5–5)
RBC # BLD: 4.07 M/UL — LOW (ref 4.2–5.4)
RBC # FLD: 13.2 % — SIGNIFICANT CHANGE UP (ref 11.5–14.5)
SODIUM SERPL-SCNC: 145 MMOL/L — SIGNIFICANT CHANGE UP (ref 135–146)
WBC # BLD: 6.3 K/UL — SIGNIFICANT CHANGE UP (ref 4.8–10.8)
WBC # FLD AUTO: 6.3 K/UL — SIGNIFICANT CHANGE UP (ref 4.8–10.8)

## 2022-02-11 PROCEDURE — 99233 SBSQ HOSP IP/OBS HIGH 50: CPT

## 2022-02-11 RX ORDER — APIXABAN 2.5 MG/1
2 TABLET, FILM COATED ORAL
Qty: 24 | Refills: 0
Start: 2022-02-11 | End: 2022-02-16

## 2022-02-11 RX ORDER — APIXABAN 2.5 MG/1
1 TABLET, FILM COATED ORAL
Qty: 60 | Refills: 0
Start: 2022-02-11 | End: 2022-03-12

## 2022-02-11 RX ADMIN — APIXABAN 10 MILLIGRAM(S): 2.5 TABLET, FILM COATED ORAL at 05:57

## 2022-02-11 RX ADMIN — CHLORHEXIDINE GLUCONATE 1 APPLICATION(S): 213 SOLUTION TOPICAL at 05:58

## 2022-02-11 NOTE — PROGRESS NOTE ADULT - ASSESSMENT
70 yo F with PMHx of Vertigo, DLD, Chronic back pain and DM noncompliant with medications because she reads articles about their side effects and does not want to take them presents to the ED for evaluation of left calf pain radiating up to the LLQ x 2 days.    # Left lower ext DVT   sp thrombectomy   Eliquis     # Enlarged myometrial uterus secondary to  Bulky  uterine fibroids  plan for surgery outpatient , appointment 2/28      # Right Hydronephrosis sec to enlarged uteruus - creatinine stable    # DM type2  CAPILLARY BLOOD GLUCOSE      POCT Blood Glucose.: 107 mg/dL (11 Feb 2022 07:11)  POCT Blood Glucose.: 102 mg/dL (10 Feb 2022 21:03)  POCT Blood Glucose.: 94 mg/dL (10 Feb 2022 16:23)  POCT Blood Glucose.: 108 mg/dL (10 Feb 2022 11:09)  controlled    # Chronic back pain probably se to bulky uterus/ radiculopathy controlled    # Vertigo  - c/w meclizine    # Dyslipidemia  - statin    PROGRESS NOTE HANDOFF    Pendin barrier to dc is coverage of eliquis , cm working on insurance coverage for eliquis , once arranged dc home    Family discussion: patient verbalized understanding and agreeable to plan of care     Disposition: Home

## 2022-02-11 NOTE — PROGRESS NOTE ADULT - SUBJECTIVE AND OBJECTIVE BOX
POWERS, RENEA  71y  [facility] F3-4B 010 A      Patient is a 71y old  Female who presents with a chief complaint of Left calf pain (10 Feb 2022 10:17)      INTERVAL HPI/OVERNIGHT EVENTS:    no acute overnight events    REVIEW OF SYSTEMS:  CONSTITUTIONAL: No fever, weight loss, or fatigue  EYES: No eye pain, visual disturbances, or discharge  ENMT:  No difficulty hearing, tinnitus, vertigo; No sinus or throat pain  NECK: No pain or stiffness  BREASTS: No pain, masses, or nipple discharge  RESPIRATORY: No cough, wheezing, chills or hemoptysis; No shortness of breath  CARDIOVASCULAR: No chest pain, palpitations, dizziness, or leg swelling  GASTROINTESTINAL: No abdominal or epigastric pain. No nausea, vomiting, or hematemesis; No diarrhea or constipation. No melena or hematochezia.  GENITOURINARY: No dysuria, frequency, hematuria, or incontinence  NEUROLOGICAL: No headaches, memory loss, loss of strength, numbness, or tremors  SKIN: No itching, burning, rashes, or lesions   LYMPH NODES: No enlarged glands  ENDOCRINE: No heat or cold intolerance; No hair loss  MUSCULOSKELETAL: No joint pain or swelling; No muscle, back, or extremity pain  PSYCHIATRIC: No depression, anxiety, mood swings, or difficulty sleeping  HEME/LYMPH: No easy bruising, or bleeding gums  ALLERY AND IMMUNOLOGIC: No hives or eczema  FAMILY HISTORY:    T(C): 36.6 (02-10-22 @ 09:03), Max: 36.6 (02-10-22 @ 09:03)  HR: 71 (02-10-22 @ 09:03) (70 - 75)  BP: 128/69 (02-10-22 @ 09:03) (117/63 - 128/69)  RR: 18 (02-10-22 @ 09:03) (18 - 18)  SpO2: --  Wt(kg): --Vital Signs Last 24 Hrs  T(C): 36.6 (10 Feb 2022 09:03), Max: 36.6 (10 Feb 2022 09:03)  T(F): 97.9 (10 Feb 2022 09:03), Max: 97.9 (10 Feb 2022 09:03)  HR: 71 (10 Feb 2022 09:03) (70 - 75)  BP: 128/69 (10 Feb 2022 09:03) (117/63 - 128/69)  BP(mean): --  RR: 18 (10 Feb 2022 09:03) (18 - 18)  SpO2: --    PHYSICAL EXAM:  GENERAL: NAD, well-groomed, well-developed  HEAD:  Atraumatic, Normocephalic  EYES: EOMI, PERRLA, conjunctiva and sclera clear  ENMT: No tonsillar erythema, exudates, or enlargement; Moist mucous membranes, Good dentition, No lesions  NECK: Supple, No JVD, Normal thyroid  NERVOUS SYSTEM:  Alert & Oriented X3, Good concentration; Motor Strength 5/5 B/L upper and lower extremities; DTRs 2+ intact and symmetric  PULM: Clear to auscultation bilaterally  CARDIAC: Regular rate and rhythm; No murmurs, rubs, or gallops  GI: Soft, Nontender, Nondistended; Bowel sounds present  EXTREMITIES:  2+ Peripheral Pulses, No clubbing, cyanosis, or edema  LYMPH: No lymphadenopathy noted  SKIN: No rashes or lesions    Consultant(s) Notes Reviewed:  [x ] YES  [ ] NO  Care Discussed with Consultants/Other Providers [ x] YES  [ ] NO    LABS:                            11.8   6.88  )-----------( 287      ( 10 Feb 2022 04:30 )             36.6           < from: 12 Lead ECG (02.06.22 @ 14:21) >  Diagnosis Line Normal sinus rhythm  Rightward axis  Cannot rule out Anterior infarct , age undetermined  Abnormal ECG    < end of copied text >      < from: Xray Chest 1 View- PORTABLE-Routine (Xray Chest 1 View- PORTABLE-Routine .) (02.07.22 @ 10:24) >    IMPRESSION:    No radiographic evidence of acute pulmonary disease.    --- End of Report ---    < end of copied text >        acetaminophen     Tablet .. 650 milliGRAM(s) Oral every 6 hours PRN  atorvastatin 20 milliGRAM(s) Oral at bedtime  chlorhexidine 4% Liquid 1 Application(s) Topical <User Schedule>  heparin  Infusion 1100 Unit(s)/Hr IV Continuous <Continuous>  lidocaine   4% Patch 1 Patch Transdermal daily  meclizine 25 milliGRAM(s) Oral daily PRN  ondansetron Injectable 4 milliGRAM(s) IV Push every 8 hours PRN  sodium chloride 0.9%. 1000 milliLiter(s) IV Continuous <Continuous>      HEALTH ISSUES - PROBLEM Dx:          Case Discussed with House Staff   45 minutes spent on total encounter; more than 50% of the visit was spent counseling and/or coordinating care by the attending physician.   Spectra [phone]  
RENEA POWERS 71y Female  MRN#: [id number]   Hospital Day: 1d    SUBJECTIVE  Patient is a 71y old Female who presents with a chief complaint of Left calf pain (2022 01:06)  Currently admitted to medicine with the primary diagnosis of Left leg DVT      INTERVAL HPI AND OVERNIGHT EVENTS:  Patient was examined and seen at bedside. This morning she is resting comfortably in bed and reports no issues or overnight events.    REVIEW OF SYMPTOMS:  CONSTITUTIONAL: No weakness, fevers or chills; No headaches  EYES: No visual changes, eye pain, or discharge  ENT: No vertigo; No ear pain or change in hearing; No sore throat or difficulty swallowing  NECK: No pain or stiffness  RESPIRATORY: No cough, wheezing, or hemoptysis; No shortness of breath  CARDIOVASCULAR: No chest pain or palpitations  GASTROINTESTINAL: No abdominal or epigastric pain; No nausea, vomiting, or hematemesis; No diarrhea or constipation; No melena or hematochezia  GENITOURINARY: No dysuria, frequency or hematuria  MUSCULOSKELETAL: No joint pain, no muscle pain, no weakness  NEUROLOGICAL: No numbness or weakness  SKIN: No itching or rashes    OBJECTIVE  PAST MEDICAL & SURGICAL HISTORY  DM (diabetes mellitus)    HTN (hypertension)    GERD (gastroesophageal reflux disease)      ALLERGIES:  IV Contrast (Short breath)  sulfa drugs (Other)    MEDICATIONS:  STANDING MEDICATIONS  atorvastatin 20 milliGRAM(s) Oral at bedtime  chlorhexidine 4% Liquid 1 Application(s) Topical <User Schedule>  heparin  Infusion 1300 Unit(s)/Hr IV Continuous <Continuous>  lidocaine   4% Patch 1 Patch Transdermal daily  predniSONE   Tablet 50 milliGRAM(s) Oral every 6 hours    PRN MEDICATIONS  acetaminophen     Tablet .. 650 milliGRAM(s) Oral every 6 hours PRN  meclizine 25 milliGRAM(s) Oral daily PRN  oxycodone    5 mG/acetaminophen 325 mG 1 Tablet(s) Oral every 6 hours PRN      VITAL SIGNS: Last 24 Hours  T(C): 37.6 (2022 08:29), Max: 37.6 (2022 08:29)  T(F): 99.6 (2022 08:29), Max: 99.6 (2022 08:29)  HR: 97 (2022 08:29) (70 - 105)  BP: 134/61 (2022 08:29) (102/54 - 144/64)  BP(mean): --  RR: 18 (2022 08:29) (16 - 18)  SpO2: 99% (2022 08:29) (97% - 99%)    LABS:                        13.8   9.91  )-----------( 290      ( 2022 05:30 )             43.1     02-    137  |  102  |  16  ----------------------------<  167<H>  3.7   |  23  |  0.8    Ca    9.0      2022 05:30  Mg     2.1     -    TPro  7.5  /  Alb  3.9  /  TBili  0.6  /  DBili  x   /  AST  36  /  ALT  43<H>  /  AlkPhos  96  02-    PT/INR - ( 2022 14:30 )   PT: 13.60 sec;   INR: 1.18 ratio         PTT - ( 2022 05:30 )  PTT:188.2 sec  Urinalysis Basic - ( 2022 16:12 )    Color: Yellow / Appearance: Slightly Turbid / S.015 / pH: x  Gluc: x / Ketone: Trace  / Bili: Negative / Urobili: <2 mg/dL   Blood: x / Protein: Trace / Nitrite: Negative   Leuk Esterase: Negative / RBC: 9 /HPF / WBC 1 /HPF   Sq Epi: x / Non Sq Epi: 2 /HPF / Bacteria: Negative        Lactate, Blood: 1.7 mmol/L (22 @ 14:30)          RADIOLOGY:      PHYSICAL EXAM:  CONSTITUTIONAL: No acute distress, well-developed, well-groomed, AAOx3  HEAD: Atraumatic, normocephalic  EYES: EOM intact, PERRLA, conjunctiva and sclera clear  ENT: Supple, no masses, no thyromegaly, no bruits, no JVD; moist mucous membranes  PULMONARY: Clear to auscultation bilaterally; no wheezes, rales, or rhonchi  CARDIOVASCULAR: Regular rate and rhythm; no murmurs, rubs, or gallops  GASTROINTESTINAL: Soft, non-tender, non-distended; bowel sounds present  MUSCULOSKELETAL: 2+ peripheral pulses; no clubbing, no cyanosis, no edema  NEUROLOGY: non-focal  SKIN: No rashes or lesions; warm and dry    
PROGRESS NOTE:     Migel Ervin MS4    Patient is a 71y old  Female who presents with a chief complaint of Left calf pain (09 Feb 2022 07:56)      SUBJECTIVE:  No acute events overnight. Patient seen and evaluated at bedside. Reports pins and needles of right foot. Wants to work with PT  No fever/chills, SOB, chest pain, palpitations, abdominal pain, nausea/vomiting, diarrhea.        MEDICATIONS  (STANDING):  atorvastatin 20 milliGRAM(s) Oral at bedtime  chlorhexidine 4% Liquid 1 Application(s) Topical <User Schedule>  heparin  Infusion 1000 Unit(s)/Hr (7 mL/Hr) IV Continuous <Continuous>  lidocaine   4% Patch 1 Patch Transdermal daily  sodium chloride 0.9%. 1000 milliLiter(s) (100 mL/Hr) IV Continuous <Continuous>    MEDICATIONS  (PRN):  acetaminophen     Tablet .. 650 milliGRAM(s) Oral every 6 hours PRN Temp greater or equal to 38C (100.4F), Mild Pain (1 - 3)  meclizine 25 milliGRAM(s) Oral daily PRN Dizziness  ondansetron Injectable 4 milliGRAM(s) IV Push every 8 hours PRN Nausea and/or Vomiting      CAPILLARY BLOOD GLUCOSE      POCT Blood Glucose.: 139 mg/dL (08 Feb 2022 15:41)  POCT Blood Glucose.: 151 mg/dL (08 Feb 2022 11:59)    I&O's Summary    09 Feb 2022 07:01  -  09 Feb 2022 10:04  --------------------------------------------------------  IN: 200 mL / OUT: 300 mL / NET: -100 mL        PHYSICAL EXAM:  Vital Signs Last 24 Hrs  T(C): 36.9 (09 Feb 2022 08:41), Max: 36.9 (09 Feb 2022 08:41)  T(F): 98.4 (09 Feb 2022 08:41), Max: 98.4 (09 Feb 2022 08:41)  HR: 77 (09 Feb 2022 08:41) (60 - 81)  BP: 126/59 (09 Feb 2022 08:41) (111/58 - 163/62)  BP(mean): --  RR: 18 (09 Feb 2022 08:41) (17 - 20)  SpO2: 97% (08 Feb 2022 20:04) (97% - 100%)    CONSTITUTIONAL: NAD, well-developed  RESPIRATORY: Normal respiratory effort; lungs are clear to auscultation bilaterally  CARDIOVASCULAR: Regular rate and rhythm, normal S1 and S2, no murmur/rub/gallop; No lower extremity edema; Peripheral pulses are 2+ bilaterally  ABDOMEN: pressure sensation to palpation on suprapubic area, normoactive bowel sounds, no rebound/guarding; No hepatosplenomegaly  MUSCLOSKELETAL: no clubbing or cyanosis of digits; no joint swelling or tenderness to palpation  PSYCH: A+O to person, place, and time; affect appropriate    LABS:                        11.9   10.29 )-----------( 313      ( 09 Feb 2022 04:30 )             36.3     02-08    136  |  101  |  16  ----------------------------<  168<H>  4.3   |  23  |  0.7    Ca    8.8      08 Feb 2022 07:00  Mg     2.1     02-08    TPro  7.0  /  Alb  3.7  /  TBili  0.6  /  DBili  x   /  AST  61<H>  /  ALT  79<H>  /  AlkPhos  105  02-08    PTT - ( 09 Feb 2022 04:30 )  PTT:61.2 sec          Culture - Urine (collected 06 Feb 2022 16:12)  Source: Clean Catch Clean Catch (Midstream)  Final Report (08 Feb 2022 09:46):    >=3 organisms. Probable collection contamination.        RADIOLOGY & ADDITIONAL TESTS:  Results Reviewed:   Imaging Personally Reviewed:  Electrocardiogram Personally Reviewed:    COORDINATION OF CARE:  Care Discussed with Consultants/Other Providers [Y/N]:  Prior or Outpatient Records Reviewed [Y/N]:  
VASCULAR SURGERY PROGRESS NOTE    Patient: RENEA POWERS , 71y ([birth year])Female   MRN: [id number]  Location: [address] 010 A  Visit: 02-06-22 Inpatient  Date: 02-09-22 @ 07:56    Hospital Day #: 4  Post-Op Day #: 1    Procedure/Dx/Injuries: 71F s/p mechanical thrombectomy of LLE.    Events of past 24 hours: Patient underwent mechanical thrombectomy of LLE, HD stable, minimal pain. No nausea/vomiting/fevers/chills    PAST MEDICAL & SURGICAL HISTORY:  DM (diabetes mellitus)    HTN (hypertension)    GERD (gastroesophageal reflux disease)        Vitals:   T(F): 97.5 (02-09-22 @ 00:34), Max: 98 (02-08-22 @ 18:04)  HR: 74 (02-09-22 @ 00:34)  BP: 117/62 (02-09-22 @ 00:34)  RR: 18 (02-09-22 @ 00:34)  SpO2: 97% (02-08-22 @ 20:04)      Diet, Consistent Carbohydrate w/Evening Snack      Fluids: sodium chloride 0.9%.: Solution, 1000 milliLiter(s) infuse at 100 mL/Hr  Special Instructions: start the fluids at midnight when patient is NPO    PHYSICAL EXAM:  General Appearance: NAD,   Heart: RRR  Lungs: CTABL  Abdomen:  Soft, nontender,   MSK/Extremities: Warm & well-perfused. Peripheral pulses intact. LLE covered with ACE, moderately swollen, nontender    MEDICATIONS  (STANDING):  atorvastatin 20 milliGRAM(s) Oral at bedtime  chlorhexidine 4% Liquid 1 Application(s) Topical <User Schedule>  heparin  Infusion 1000 Unit(s)/Hr (7 mL/Hr) IV Continuous <Continuous>  lidocaine   4% Patch 1 Patch Transdermal daily  sodium chloride 0.9%. 1000 milliLiter(s) (100 mL/Hr) IV Continuous <Continuous>    MEDICATIONS  (PRN):  acetaminophen     Tablet .. 650 milliGRAM(s) Oral every 6 hours PRN Temp greater or equal to 38C (100.4F), Mild Pain (1 - 3)  meclizine 25 milliGRAM(s) Oral daily PRN Dizziness  ondansetron Injectable 4 milliGRAM(s) IV Push every 8 hours PRN Nausea and/or Vomiting      DVT PROPHYLAXIS: heparin  Infusion 1000 Unit(s)/Hr IV Continuous <Continuous>    GI PROPHYLAXIS:   ANTICOAGULATION:   ANTIBIOTICS:      LAB/STUDIES:  Labs:  CAPILLARY BLOOD GLUCOSE      POCT Blood Glucose.: 139 mg/dL (08 Feb 2022 15:41)  POCT Blood Glucose.: 151 mg/dL (08 Feb 2022 11:59)  POCT Blood Glucose.: 162 mg/dL (08 Feb 2022 08:07)                          13.0   7.73  )-----------( 317      ( 08 Feb 2022 07:00 )             39.7         02-08    136  |  101  |  16  ----------------------------<  168<H>  4.3   |  23  |  0.7          LFTs:             7.0  | 0.6  | 61       ------------------[105     ( 08 Feb 2022 07:00 )  3.7  | x    | 79          Lipase:x      Amylase:x         Lactate, Blood: 1.7 mmol/L (02-06-22 @ 14:30)  Blood Gas Venous - Lactate: 1.60 mmol/L (02-06-22 @ 14:10)      Coags:     x      ----< x       ( 09 Feb 2022 01:15 )     62.0      Culture - Urine (collected 06 Feb 2022 16:12)  Source: Clean Catch Clean Catch (Midstream)  Final Report (08 Feb 2022 09:46):    >=3 organisms. Probable collection contamination.      ACCESS/ DEVICES:  [x] Peripheral IV  
  POWERS, RENEA  71y  [facility] F3-4B 010 A      Patient is a 71y old  Female who presents with a chief complaint of Left calf pain (09 Feb 2022 15:34)      INTERVAL HPI/OVERNIGHT EVENTS:      no acute events overnight   REVIEW OF SYSTEMS:  CONSTITUTIONAL: No fever, weight loss, or fatigue  EYES: No eye pain, visual disturbances, or discharge  ENMT:  No difficulty hearing, tinnitus, vertigo; No sinus or throat pain  NECK: No pain or stiffness  BREASTS: No pain, masses, or nipple discharge  RESPIRATORY: No cough, wheezing, chills or hemoptysis; No shortness of breath  CARDIOVASCULAR: No chest pain, palpitations, dizziness, or leg swelling  GASTROINTESTINAL:Abdominal discomfort  GENITOURINARY: No dysuria, frequency, hematuria, or incontinence  NEUROLOGICAL: No headaches, memory loss, loss of strength, numbness, or tremors  SKIN: No itching, burning, rashes, or lesions   LYMPH NODES: No enlarged glands  ENDOCRINE: No heat or cold intolerance; No hair loss  MUSCULOSKELETAL: No joint pain or swelling; No muscle, back, or extremity pain  PSYCHIATRIC: No depression, anxiety, mood swings, or difficulty sleeping  HEME/LYMPH: No easy bruising, or bleeding gums  ALLERY AND IMMUNOLOGIC: No hives or eczema  FAMILY HISTORY:    T(C): 36.5 (02-09-22 @ 15:46), Max: 36.9 (02-09-22 @ 08:41)  HR: 75 (02-09-22 @ 15:46) (60 - 81)  BP: 120/59 (02-09-22 @ 15:46) (117/62 - 163/62)  RR: 18 (02-09-22 @ 15:46) (18 - 20)  SpO2: 97% (02-08-22 @ 20:04) (97% - 100%)  Wt(kg): --Vital Signs Last 24 Hrs  T(C): 36.5 (09 Feb 2022 15:46), Max: 36.9 (09 Feb 2022 08:41)  T(F): 97.7 (09 Feb 2022 15:46), Max: 98.4 (09 Feb 2022 08:41)  HR: 75 (09 Feb 2022 15:46) (60 - 81)  BP: 120/59 (09 Feb 2022 15:46) (117/62 - 163/62)  BP(mean): --  RR: 18 (09 Feb 2022 15:46) (18 - 20)  SpO2: 97% (08 Feb 2022 20:04) (97% - 100%)    PHYSICAL EXAM:  GENERAL: NAD, well-groomed, well-developed  HEAD:  Atraumatic, Normocephalic  EYES: EOMI, PERRLA, conjunctiva and sclera clear  ENMT: No tonsillar erythema, exudates, or enlargement; Moist mucous membranes, Good dentition, No lesions  NECK: Supple, No JVD, Normal thyroid  NERVOUS SYSTEM:  Alert & Oriented X3, Good concentration; Motor Strength 5/5 B/L upper and lower extremities; DTRs 2+ intact and symmetric  PULM: Clear to auscultation bilaterally  CARDIAC: Regular rate and rhythm; No murmurs, rubs, or gallops  GI: abdominal distention   EXTREMITIES:  2+ Peripheral Pulses, No clubbing, cyanosis, or edema  LYMPH: No lymphadenopathy noted  SKIN: No rashes or lesions    Consultant(s) Notes Reviewed:  [x ] YES  [ ] NO  Care Discussed with Consultants/Other Providers [ x] YES  [ ] NO    LABS:                            11.9   10.29 )-----------( 313      ( 09 Feb 2022 04:30 )             36.3   02-08    136  |  101  |  16  ----------------------------<  168<H>  4.3   |  23  |  0.7    Ca    8.8      08 Feb 2022 07:00  Mg     2.1     02-08    TPro  7.0  /  Alb  3.7  /  TBili  0.6  /  DBili  x   /  AST  61<H>  /  ALT  79<H>  /  AlkPhos  105  02-08            acetaminophen     Tablet .. 650 milliGRAM(s) Oral every 6 hours PRN  atorvastatin 20 milliGRAM(s) Oral at bedtime  chlorhexidine 4% Liquid 1 Application(s) Topical <User Schedule>  heparin  Infusion 700 Unit(s)/Hr IV Continuous <Continuous>  lidocaine   4% Patch 1 Patch Transdermal daily  meclizine 25 milliGRAM(s) Oral daily PRN  ondansetron Injectable 4 milliGRAM(s) IV Push every 8 hours PRN  sodium chloride 0.9%. 1000 milliLiter(s) IV Continuous <Continuous>      HEALTH ISSUES - PROBLEM Dx:          Case Discussed with House Staff     Spectra [phone]  
  POWERS, RENEA  71y  [facility] F3-4B 010 A      Patient is a 71y old  Female who presents with a chief complaint of Left calf pain (10 Feb 2022 11:41)      INTERVAL HPI/OVERNIGHT EVENTS:    no acute events overnight     REVIEW OF SYSTEMS:  CONSTITUTIONAL: No fever, weight loss, or fatigue  EYES: No eye pain, visual disturbances, or discharge  ENMT:  No difficulty hearing, tinnitus, vertigo; No sinus or throat pain  NECK: No pain or stiffness  BREASTS: No pain, masses, or nipple discharge  RESPIRATORY: No cough, wheezing, chills or hemoptysis; No shortness of breath  CARDIOVASCULAR: No chest pain, palpitations, dizziness, or leg swelling  GASTROINTESTINAL: No abdominal or epigastric pain. No nausea, vomiting, or hematemesis; No diarrhea or constipation. No melena or hematochezia.  GENITOURINARY: No dysuria, frequency, hematuria, or incontinence  NEUROLOGICAL: No headaches, memory loss, loss of strength, numbness, or tremors  SKIN: No itching, burning, rashes, or lesions   LYMPH NODES: No enlarged glands  ENDOCRINE: No heat or cold intolerance; No hair loss  MUSCULOSKELETAL: No joint pain or swelling; No muscle, back, or extremity pain  PSYCHIATRIC: No depression, anxiety, mood swings, or difficulty sleeping  HEME/LYMPH: No easy bruising, or bleeding gums  ALLERY AND IMMUNOLOGIC: No hives or eczema  FAMILY HISTORY:    T(C): 36.6 (02-11-22 @ 08:34), Max: 37 (02-10-22 @ 16:00)  HR: 61 (02-11-22 @ 08:34) (61 - 72)  BP: 134/74 (02-11-22 @ 08:34) (118/66 - 140/67)  RR: 18 (02-11-22 @ 08:34) (18 - 18)  SpO2: --  Wt(kg): --Vital Signs Last 24 Hrs  T(C): 36.6 (11 Feb 2022 08:34), Max: 37 (10 Feb 2022 16:00)  T(F): 97.9 (11 Feb 2022 08:34), Max: 98.6 (10 Feb 2022 16:00)  HR: 61 (11 Feb 2022 08:34) (61 - 72)  BP: 134/74 (11 Feb 2022 08:34) (118/66 - 140/67)  BP(mean): --  RR: 18 (11 Feb 2022 08:34) (18 - 18)  SpO2: --    PHYSICAL EXAM:  GENERAL: NAD, well-groomed, well-developed  HEAD:  Atraumatic, Normocephalic  EYES: EOMI, PERRLA, conjunctiva and sclera clear  ENMT: No tonsillar erythema, exudates, or enlargement; Moist mucous membranes, Good dentition, No lesions  NECK: Supple, No JVD, Normal thyroid  NERVOUS SYSTEM:  Alert & Oriented X3, Good concentration; Motor Strength 5/5 B/L upper and lower extremities; DTRs 2+ intact and symmetric  PULM: Clear to auscultation bilaterally  CARDIAC: Regular rate and rhythm; No murmurs, rubs, or gallops  GI: Soft, Nontender, Nondistended; Bowel sounds present  EXTREMITIES:  2+ Peripheral Pulses, No clubbing, cyanosis, or edema  LYMPH: No lymphadenopathy noted  SKIN: No rashes or lesions    Consultant(s) Notes Reviewed:  [x ] YES  [ ] NO  Care Discussed with Consultants/Other Providers [ x] YES  [ ] NO    LABS:                            11.9   6.30  )-----------( 297      ( 11 Feb 2022 07:43 )             35.8                 acetaminophen     Tablet .. 650 milliGRAM(s) Oral every 6 hours PRN  apixaban 10 milliGRAM(s) Oral every 12 hours  atorvastatin 20 milliGRAM(s) Oral at bedtime  chlorhexidine 4% Liquid 1 Application(s) Topical <User Schedule>  lidocaine   4% Patch 1 Patch Transdermal daily  meclizine 25 milliGRAM(s) Oral daily PRN  ondansetron Injectable 4 milliGRAM(s) IV Push every 8 hours PRN  sodium chloride 0.9%. 1000 milliLiter(s) IV Continuous <Continuous>      HEALTH ISSUES - PROBLEM Dx:          Case Discussed with House Staff   Spectra [phone]  
PROGRESS NOTE:     Migel Ervin MS4    Patient is a 71y old  Female who presents with a chief complaint of Left calf pain (09 Feb 2022 17:19)      SUBJECTIVE:  No acute events overnight. Patient seen and evaluated at bedside. Pt complains of spasms occasionally while awake, has had them in the past and always remembers them when they happen. She does not think she is anxious. Reports some L knee and toe pain.  No fever/chills, SOB, chest pain, palpitations, abdominal pain, nausea/vomiting, diarrhea.        MEDICATIONS  (STANDING):  atorvastatin 20 milliGRAM(s) Oral at bedtime  chlorhexidine 4% Liquid 1 Application(s) Topical <User Schedule>  heparin  Infusion 1100 Unit(s)/Hr (13 mL/Hr) IV Continuous <Continuous>  lidocaine   4% Patch 1 Patch Transdermal daily  sodium chloride 0.9%. 1000 milliLiter(s) (100 mL/Hr) IV Continuous <Continuous>    MEDICATIONS  (PRN):  acetaminophen     Tablet .. 650 milliGRAM(s) Oral every 6 hours PRN Temp greater or equal to 38C (100.4F), Mild Pain (1 - 3)  meclizine 25 milliGRAM(s) Oral daily PRN Dizziness  ondansetron Injectable 4 milliGRAM(s) IV Push every 8 hours PRN Nausea and/or Vomiting      CAPILLARY BLOOD GLUCOSE      POCT Blood Glucose.: 93 mg/dL (10 Feb 2022 07:32)  POCT Blood Glucose.: 124 mg/dL (09 Feb 2022 11:12)    I&O's Summary    09 Feb 2022 07:01  -  10 Feb 2022 07:00  --------------------------------------------------------  IN: 400 mL / OUT: 800 mL / NET: -400 mL    10 Feb 2022 07:01  -  10 Feb 2022 10:18  --------------------------------------------------------  IN: 200 mL / OUT: 800 mL / NET: -600 mL        PHYSICAL EXAM:  Vital Signs Last 24 Hrs  T(C): 36.6 (10 Feb 2022 09:03), Max: 36.6 (10 Feb 2022 09:03)  T(F): 97.9 (10 Feb 2022 09:03), Max: 97.9 (10 Feb 2022 09:03)  HR: 71 (10 Feb 2022 09:03) (70 - 75)  BP: 128/69 (10 Feb 2022 09:03) (117/63 - 128/69)  BP(mean): --  RR: 18 (10 Feb 2022 09:03) (18 - 18)  SpO2: --    CONSTITUTIONAL: NAD, well-developed  RESPIRATORY: Normal respiratory effort; lungs are clear to auscultation bilaterally  CARDIOVASCULAR: Regular rate and rhythm, normal S1 and S2, no murmur/rub/gallop; No lower extremity edema; Peripheral pulses are 2+ bilaterally  ABDOMEN: Nontender to palpation, normoactive bowel sounds, no rebound/guarding; No hepatosplenomegaly  MUSCLOSKELETAL: no clubbing or cyanosis of digits; no joint swelling or tenderness to palpation  PSYCH: A+O to person, place, and time; affect appropriate    LABS:                        11.8   6.88  )-----------( 287      ( 10 Feb 2022 04:30 )             36.6           PTT - ( 10 Feb 2022 04:30 )  PTT:82.9 sec            RADIOLOGY & ADDITIONAL TESTS:  Results Reviewed:   Imaging Personally Reviewed:  Electrocardiogram Personally Reviewed:    COORDINATION OF CARE:  Care Discussed with Consultants/Other Providers [Y/N]:  Prior or Outpatient Records Reviewed [Y/N]:  
PROGRESS NOTE:     Migel Ervin MS4    Patient is a 71y old  Female who presents with a chief complaint of Left calf pain (2022 11:25)      SUBJECTIVE:  No acute events overnight. Patient seen and evaluated at bedside. No current nausea after vomiting yesterday afternoon. No complaints at this time.  No fever/chills, SOB, chest pain, palpitations, abdominal pain, nausea/vomiting, diarrhea.        MEDICATIONS  (STANDING):  atorvastatin 20 milliGRAM(s) Oral at bedtime  chlorhexidine 4% Liquid 1 Application(s) Topical <User Schedule>  heparin  Infusion 1000 Unit(s)/Hr (10 mL/Hr) IV Continuous <Continuous>  hydrocortisone sodium succinate Injectable 100 milliGRAM(s) IV Push once  lidocaine   4% Patch 1 Patch Transdermal daily  sodium chloride 0.9%. 1000 milliLiter(s) (100 mL/Hr) IV Continuous <Continuous>    MEDICATIONS  (PRN):  acetaminophen     Tablet .. 650 milliGRAM(s) Oral every 6 hours PRN Temp greater or equal to 38C (100.4F), Mild Pain (1 - 3)  diphenhydrAMINE Injectable 50 milliGRAM(s) IV Push once PRN Rash and/or Itching  meclizine 25 milliGRAM(s) Oral daily PRN Dizziness  ondansetron Injectable 4 milliGRAM(s) IV Push every 8 hours PRN Nausea and/or Vomiting  oxycodone    5 mG/acetaminophen 325 mG 1 Tablet(s) Oral every 6 hours PRN Moderate Pain (4 - 6)      CAPILLARY BLOOD GLUCOSE      POCT Blood Glucose.: 162 mg/dL (2022 08:07)  POCT Blood Glucose.: 147 mg/dL (2022 06:22)  POCT Blood Glucose.: 129 mg/dL (2022 01:18)  POCT Blood Glucose.: 174 mg/dL (2022 11:47)    I&O's Summary      PHYSICAL EXAM:  Vital Signs Last 24 Hrs  T(C): 36.4 (2022 08:15), Max: 37.4 (2022 01:00)  T(F): 97.6 (2022 08:15), Max: 99.3 (2022 01:00)  HR: 68 (2022 08:15) (68 - 92)  BP: 111/58 (2022 08:15) (111/58 - 140/77)  BP(mean): --  RR: 18 (2022 08:15) (18 - 20)  SpO2: 97% (2022 22:26) (97% - 98%)    CONSTITUTIONAL: NAD, well-developed  RESPIRATORY: Normal respiratory effort; lungs are clear to auscultation bilaterally  CARDIOVASCULAR: Regular rate and rhythm, normal S1 and S2, no murmur/rub/gallop; No lower extremity edema; Peripheral pulses are 2+ bilaterally  ABDOMEN: Nontender to palpation, normoactive bowel sounds, no rebound/guarding; No hepatosplenomegaly  MUSCLOSKELETAL: + LLE swelling and tenderness to palpation. no clubbing or cyanosis of digits.  PSYCH: A+O to person, place, and time; affect appropriate    LABS:                        13.0   7.73  )-----------( 317      ( 2022 07:00 )             39.7     02-08    136  |  101  |  16  ----------------------------<  168<H>  4.3   |  23  |  0.7    Ca    8.8      2022 07:00  Mg     2.1     02-08    TPro  7.0  /  Alb  3.7  /  TBili  0.6  /  DBili  x   /  AST  61<H>  /  ALT  79<H>  /  AlkPhos  105  02-08    PT/INR - ( 2022 14:30 )   PT: 13.60 sec;   INR: 1.18 ratio         PTT - ( 2022 07:00 )  PTT:70.0 sec      Urinalysis Basic - ( 2022 16:12 )    Color: Yellow / Appearance: Slightly Turbid / S.015 / pH: x  Gluc: x / Ketone: Trace  / Bili: Negative / Urobili: <2 mg/dL   Blood: x / Protein: Trace / Nitrite: Negative   Leuk Esterase: Negative / RBC: 9 /HPF / WBC 1 /HPF   Sq Epi: x / Non Sq Epi: 2 /HPF / Bacteria: Negative        Culture - Urine (collected 2022 16:12)  Source: Clean Catch Clean Catch (Midstream)  Final Report (2022 09:46):    >=3 organisms. Probable collection contamination.        RADIOLOGY & ADDITIONAL TESTS:  Results Reviewed:   Imaging Personally Reviewed:  Electrocardiogram Personally Reviewed:    COORDINATION OF CARE:  Care Discussed with Consultants/Other Providers [Y/N]:  Prior or Outpatient Records Reviewed [Y/N]:  
Patient is a 71y old  Female who presents with a chief complaint of Left calf pain (2022 09:58)    Patient was seen and examined.  Planned for left lower extremity venogram, possible thrombectomy today  no new complaints    PAST MEDICAL & SURGICAL HISTORY:  DM (diabetes mellitus)  HTN (hypertension)  GERD (gastroesophageal reflux disease)    Allergies  IV Contrast (Short breath)  sulfa drugs (Other)    MEDICATIONS  (STANDING):  atorvastatin 20 milliGRAM(s) Oral at bedtime  chlorhexidine 4% Liquid 1 Application(s) Topical <User Schedule>  heparin  Infusion 1000 Unit(s)/Hr (10 mL/Hr) IV Continuous <Continuous>  hydrocortisone sodium succinate Injectable 100 milliGRAM(s) IV Push once  lidocaine   4% Patch 1 Patch Transdermal daily  sodium chloride 0.9%. 1000 milliLiter(s) (100 mL/Hr) IV Continuous <Continuous>    MEDICATIONS  (PRN):  acetaminophen     Tablet .. 650 milliGRAM(s) Oral every 6 hours PRN Temp greater or equal to 38C (100.4F), Mild Pain (1 - 3)  diphenhydrAMINE Injectable 50 milliGRAM(s) IV Push once PRN Rash and/or Itching  meclizine 25 milliGRAM(s) Oral daily PRN Dizziness  ondansetron Injectable 4 milliGRAM(s) IV Push every 8 hours PRN Nausea and/or Vomiting  oxycodone    5 mG/acetaminophen 325 mG 1 Tablet(s) Oral every 6 hours PRN Moderate Pain (4 - 6)    Vital Signs Last 24 Hrs  T(C): 36.4  T(F): 97.6  HR: 68  BP: 111/58  BP(mean): --  RR: 18  SpO2: 97%    O/E:  Awake, alert, not in distress.  HEENT: atraumatic, EOMI.  Chest: clear.  CVS: SIS2 +, no murmur.  P/A: obese,  BS+  CNS: non focal.  Ext: no edema feet.  Skin: no rash, no ulcers.      POCT Blood Glucose.: 162 mg/dL (2022 08:07)  POCT Blood Glucose.: 147 mg/dL (2022 06:22)  POCT Blood Glucose.: 129 mg/dL (2022 01:18)  POCT Blood Glucose.: 174 mg/dL (2022 11:47)                          13.0   7.73  )-----------( 317      ( 2022 07:00 )             39.7                         13.8   9.91  )-----------( 290      ( 2022 05:30 )             43.1     02-08    136  |  101  |  16  ----------------------------<  168<H>  4.3   |  23  |  0.7  02-07    137  |  102  |  16  ----------------------------<  167<H>  3.7   |  23  |  0.8    Ca    8.8      2022 07:00  Ca    9.0      2022 05:30  Ca    9.5      2022 14:30  Mg     2.1     02-08    TPro  7.0  /  Alb  3.7  /  TBili  0.6  /  DBili  x   /  AST  61<H>  /  ALT  79<H>  /  AlkPhos  105  02-08  TPro  7.5  /  Alb  3.9  /  TBili  0.6  /  DBili  x   /  AST  36  /  ALT  43<H>  /  AlkPhos  96  02-07  TPro  7.7  /  Alb  4.2  /  TBili  0.6  /  DBili  x   /  AST  40  /  ALT  46<H>  /  AlkPhos  102  02-06          PT/INR - ( 2022 14:30 )   PT: 13.60 sec;   INR: 1.18 ratio         PTT - ( 2022 07:00 )  PTT:70.0 sec  Urinalysis Basic - ( 2022 16:12 )    Color: Yellow / Appearance: Slightly Turbid / S.015 / pH: x  Gluc: x / Ketone: Trace  / Bili: Negative / Urobili: <2 mg/dL   Blood: x / Protein: Trace / Nitrite: Negative   Leuk Esterase: Negative / RBC: 9 /HPF / WBC 1 /HPF   Sq Epi: x / Non Sq Epi: 2 /HPF / Bacteria: Negative        Culture - Urine (collected 2022 16:12)  Source: Clean Catch Clean Catch (Midstream)  Final Report (2022 09:46):    >=3 organisms. Probable collection contamination.

## 2022-02-11 NOTE — PROGRESS NOTE ADULT - REASON FOR ADMISSION
Left calf pain

## 2022-02-11 NOTE — PROGRESS NOTE ADULT - PROVIDER SPECIALTY LIST ADULT
Vascular Surgery
Hospitalist
Internal Medicine
Internal Medicine
Hospitalist
Internal Medicine
Internal Medicine

## 2022-02-15 DIAGNOSIS — G89.29 OTHER CHRONIC PAIN: ICD-10-CM

## 2022-02-15 DIAGNOSIS — N85.8 OTHER SPECIFIED NONINFLAMMATORY DISORDERS OF UTERUS: ICD-10-CM

## 2022-02-15 DIAGNOSIS — I82.452 ACUTE EMBOLISM AND THROMBOSIS OF LEFT PERONEAL VEIN: ICD-10-CM

## 2022-02-15 DIAGNOSIS — M54.50 LOW BACK PAIN, UNSPECIFIED: ICD-10-CM

## 2022-02-15 DIAGNOSIS — Z91.14 PATIENT'S OTHER NONCOMPLIANCE WITH MEDICATION REGIMEN: ICD-10-CM

## 2022-02-15 DIAGNOSIS — R42 DIZZINESS AND GIDDINESS: ICD-10-CM

## 2022-02-15 DIAGNOSIS — I82.422 ACUTE EMBOLISM AND THROMBOSIS OF LEFT ILIAC VEIN: ICD-10-CM

## 2022-02-15 DIAGNOSIS — H40.9 UNSPECIFIED GLAUCOMA: ICD-10-CM

## 2022-02-15 DIAGNOSIS — E11.9 TYPE 2 DIABETES MELLITUS WITHOUT COMPLICATIONS: ICD-10-CM

## 2022-02-15 DIAGNOSIS — M54.10 RADICULOPATHY, SITE UNSPECIFIED: ICD-10-CM

## 2022-02-15 DIAGNOSIS — I82.412 ACUTE EMBOLISM AND THROMBOSIS OF LEFT FEMORAL VEIN: ICD-10-CM

## 2022-02-15 DIAGNOSIS — K21.9 GASTRO-ESOPHAGEAL REFLUX DISEASE WITHOUT ESOPHAGITIS: ICD-10-CM

## 2022-02-15 DIAGNOSIS — N13.39 OTHER HYDRONEPHROSIS: ICD-10-CM

## 2022-02-15 DIAGNOSIS — I82.442 ACUTE EMBOLISM AND THROMBOSIS OF LEFT TIBIAL VEIN: ICD-10-CM

## 2022-02-15 DIAGNOSIS — Z91.041 RADIOGRAPHIC DYE ALLERGY STATUS: ICD-10-CM

## 2022-02-15 DIAGNOSIS — D25.9 LEIOMYOMA OF UTERUS, UNSPECIFIED: ICD-10-CM

## 2022-02-15 DIAGNOSIS — E78.5 HYPERLIPIDEMIA, UNSPECIFIED: ICD-10-CM

## 2022-02-15 DIAGNOSIS — Z79.84 LONG TERM (CURRENT) USE OF ORAL HYPOGLYCEMIC DRUGS: ICD-10-CM

## 2022-02-15 DIAGNOSIS — Z88.2 ALLERGY STATUS TO SULFONAMIDES: ICD-10-CM

## 2022-02-15 DIAGNOSIS — H26.9 UNSPECIFIED CATARACT: ICD-10-CM

## 2022-02-15 DIAGNOSIS — I10 ESSENTIAL (PRIMARY) HYPERTENSION: ICD-10-CM

## 2022-02-15 DIAGNOSIS — I82.432 ACUTE EMBOLISM AND THROMBOSIS OF LEFT POPLITEAL VEIN: ICD-10-CM

## 2022-02-18 ENCOUNTER — OUTPATIENT (OUTPATIENT)
Dept: OUTPATIENT SERVICES | Facility: HOSPITAL | Age: 72
LOS: 1 days | Discharge: HOME | End: 2022-02-18

## 2022-02-18 ENCOUNTER — APPOINTMENT (OUTPATIENT)
Dept: INTERNAL MEDICINE | Facility: CLINIC | Age: 72
End: 2022-02-18
Payer: MEDICARE

## 2022-02-18 VITALS
HEIGHT: 59 IN | SYSTOLIC BLOOD PRESSURE: 120 MMHG | HEART RATE: 82 BPM | DIASTOLIC BLOOD PRESSURE: 81 MMHG | OXYGEN SATURATION: 100 %

## 2022-02-18 VITALS — HEIGHT: 59 IN | BODY MASS INDEX: 41.93 KG/M2 | WEIGHT: 208 LBS

## 2022-02-18 DIAGNOSIS — N13.30 UNSPECIFIED HYDRONEPHROSIS: ICD-10-CM

## 2022-02-18 DIAGNOSIS — E55.9 VITAMIN D DEFICIENCY, UNSPECIFIED: ICD-10-CM

## 2022-02-18 PROCEDURE — 99214 OFFICE O/P EST MOD 30 MIN: CPT | Mod: GC

## 2022-02-18 NOTE — PLAN
[FreeTextEntry1] : # Left lower ext DVT, recently discovered after leg swelling\par s/'p thrombectomy, Eliquis is being continued. Pt denies any bleeding or bruising. \par Instructed to stop eliquis and follow up with ER or 911 if bleeding is observed\par \par # Enlarged myometrial uterus secondary to Bulky uterine fibroids\par plan for surgery outpatient , appointment 2/28\par patient is here for medical clearance, non smoker, can perform 4 METS, she needs to follow up with Dr. Conde to get the surgery appointment and PST testing. Patient is instructed in detail to make an appointment with us afterwards and she will be medically stratified for procedure risk. \par \par # Right Hydronephrosis sec to enlarged uterus\par  - creatinine stable\par \par #R tinnitus/facial pain with intermittent vertigo and dizziness\par - CTH done, neg report\par - Tizanidine 2mg qhs PRN \par - gabapentin 100mg TID\par - PT referral for vestibular rehab \par - f/u with neurology \par - meclizine trial \par \par # Vitamin D deficiency\par - TSH WNL\par - c/w vitamin OTC supplement\par \par #HLD:\par - c/w Lipitor 40mg at bedtime \par - pt educated on risk of stopping cholesterol med\par \par #DM\par - A1c is 6.5 2021\par - lifestyle modifications, repeat lab work\par \par # HCM\par - pt declines mammo for cultural reasons\par - colonoscopy 2015\par - pap in 2015 neg as per pt\par - pt had PPSV in 2015, [id number] refused\par - Tdap last 1/2012. \par -UTD covid, boaster and flu shot\par

## 2022-02-18 NOTE — REVIEW OF SYSTEMS
[Fever] : no fever [Chills] : no chills [Night Sweats] : no night sweats [Discharge] : no discharge [Pain] : no pain [Vision Problems] : no vision problems [Earache] : no earache [Chest Pain] : no chest pain [Palpitations] : no palpitations [Orthopnea] : no orthopnea [Shortness Of Breath] : no shortness of breath [Abdominal Pain] : no abdominal pain [Nausea] : no nausea [Vomiting] : no vomiting [Dysuria] : no dysuria [Incontinence] : no incontinence [Hematuria] : no hematuria [Joint Pain] : no joint pain [Joint Stiffness] : no joint stiffness [Muscle Pain] : no muscle pain [Itching] : no itching [Skin Rash] : no skin rash [Headache] : no headache [Memory Loss] : no memory loss [Suicidal] : not suicidal [Easy Bleeding] : no easy bleeding [FreeTextEntry3] : tiinitus at baseline

## 2022-02-18 NOTE — PHYSICAL EXAM
[No Acute Distress] : no acute distress [Well Nourished] : well nourished [Normal Sclera/Conjunctiva] : normal sclera/conjunctiva [Normal Outer Ear/Nose] : the outer ears and nose were normal in appearance [No JVD] : no jugular venous distention [No Respiratory Distress] : no respiratory distress  [Normal Rate] : normal rate  [No Edema] : there was no peripheral edema [Soft] : abdomen soft [No CVA Tenderness] : no CVA  tenderness [No Joint Swelling] : no joint swelling [No Rash] : no rash [de-identified] : distended

## 2022-02-18 NOTE — HISTORY OF PRESENT ILLNESS
[FreeTextEntry1] : 72 Yo F here for follow up.  [de-identified] : 70 yo F with PMH of CBP, DM, HLD presents for follow-up. Patient was last seen in June 2021 where she complained of dizziness. Patient was recently seen in ER for clot in her left leg taken out by vascular, patient is supposed to get a hysterectomy done to alleviate her symptoms from fibroids and needs medical clearance. Patient is non smoker and can perform 4 METS.

## 2022-02-21 DIAGNOSIS — D25.9 LEIOMYOMA OF UTERUS, UNSPECIFIED: ICD-10-CM

## 2022-02-21 DIAGNOSIS — E78.5 HYPERLIPIDEMIA, UNSPECIFIED: ICD-10-CM

## 2022-02-21 DIAGNOSIS — I82.409 ACUTE EMBOLISM AND THROMBOSIS OF UNSPECIFIED DEEP VEINS OF UNSPECIFIED LOWER EXTREMITY: ICD-10-CM

## 2022-02-21 DIAGNOSIS — E55.9 VITAMIN D DEFICIENCY, UNSPECIFIED: ICD-10-CM

## 2022-02-21 DIAGNOSIS — H93.13 TINNITUS, BILATERAL: ICD-10-CM

## 2022-02-21 DIAGNOSIS — E11.9 TYPE 2 DIABETES MELLITUS WITHOUT COMPLICATIONS: ICD-10-CM

## 2022-02-27 ENCOUNTER — LABORATORY RESULT (OUTPATIENT)
Age: 72
End: 2022-02-27

## 2022-02-28 ENCOUNTER — OUTPATIENT (OUTPATIENT)
Dept: OUTPATIENT SERVICES | Facility: HOSPITAL | Age: 72
LOS: 1 days | Discharge: HOME | End: 2022-02-28

## 2022-02-28 ENCOUNTER — APPOINTMENT (OUTPATIENT)
Dept: OBGYN | Facility: CLINIC | Age: 72
End: 2022-02-28
Payer: MEDICARE

## 2022-02-28 VITALS — BODY MASS INDEX: 41.81 KG/M2 | SYSTOLIC BLOOD PRESSURE: 138 MMHG | DIASTOLIC BLOOD PRESSURE: 89 MMHG | WEIGHT: 207 LBS

## 2022-02-28 PROCEDURE — 99214 OFFICE O/P EST MOD 30 MIN: CPT

## 2022-03-02 LAB — HPV HIGH+LOW RISK DNA PNL CVX: DETECTED

## 2022-03-03 ENCOUNTER — NON-APPOINTMENT (OUTPATIENT)
Age: 72
End: 2022-03-03

## 2022-03-08 ENCOUNTER — OUTPATIENT (OUTPATIENT)
Dept: OUTPATIENT SERVICES | Facility: HOSPITAL | Age: 72
LOS: 1 days | Discharge: HOME | End: 2022-03-08
Payer: MEDICARE

## 2022-03-08 ENCOUNTER — RESULT REVIEW (OUTPATIENT)
Age: 72
End: 2022-03-08

## 2022-03-08 VITALS
SYSTOLIC BLOOD PRESSURE: 141 MMHG | TEMPERATURE: 98 F | RESPIRATION RATE: 16 BRPM | OXYGEN SATURATION: 99 % | DIASTOLIC BLOOD PRESSURE: 75 MMHG | HEART RATE: 85 BPM | WEIGHT: 206.35 LBS | HEIGHT: 59 IN

## 2022-03-08 DIAGNOSIS — D21.9 BENIGN NEOPLASM OF CONNECTIVE AND OTHER SOFT TISSUE, UNSPECIFIED: ICD-10-CM

## 2022-03-08 DIAGNOSIS — Z01.818 ENCOUNTER FOR OTHER PREPROCEDURAL EXAMINATION: ICD-10-CM

## 2022-03-08 DIAGNOSIS — Z98.890 OTHER SPECIFIED POSTPROCEDURAL STATES: Chronic | ICD-10-CM

## 2022-03-08 LAB
A1C WITH ESTIMATED AVERAGE GLUCOSE RESULT: 6.3 % — HIGH (ref 4–5.6)
ALBUMIN SERPL ELPH-MCNC: 4.1 G/DL — SIGNIFICANT CHANGE UP (ref 3.5–5.2)
ALP SERPL-CCNC: 91 U/L — SIGNIFICANT CHANGE UP (ref 30–115)
ALT FLD-CCNC: 20 U/L — SIGNIFICANT CHANGE UP (ref 0–41)
ANION GAP SERPL CALC-SCNC: 14 MMOL/L — SIGNIFICANT CHANGE UP (ref 7–14)
APTT BLD: 39 SEC — SIGNIFICANT CHANGE UP (ref 27–39.2)
AST SERPL-CCNC: 23 U/L — SIGNIFICANT CHANGE UP (ref 0–41)
BASOPHILS # BLD AUTO: 0.03 K/UL — SIGNIFICANT CHANGE UP (ref 0–0.2)
BASOPHILS NFR BLD AUTO: 0.5 % — SIGNIFICANT CHANGE UP (ref 0–1)
BILIRUB SERPL-MCNC: 0.3 MG/DL — SIGNIFICANT CHANGE UP (ref 0.2–1.2)
BUN SERPL-MCNC: 18 MG/DL — SIGNIFICANT CHANGE UP (ref 10–20)
CALCIUM SERPL-MCNC: 9.3 MG/DL — SIGNIFICANT CHANGE UP (ref 8.5–10.1)
CHLORIDE SERPL-SCNC: 102 MMOL/L — SIGNIFICANT CHANGE UP (ref 98–110)
CO2 SERPL-SCNC: 26 MMOL/L — SIGNIFICANT CHANGE UP (ref 17–32)
CREAT SERPL-MCNC: 1 MG/DL — SIGNIFICANT CHANGE UP (ref 0.7–1.5)
EGFR: 61 ML/MIN/1.73M2 — SIGNIFICANT CHANGE UP
EOSINOPHIL # BLD AUTO: 0.28 K/UL — SIGNIFICANT CHANGE UP (ref 0–0.7)
EOSINOPHIL NFR BLD AUTO: 5 % — SIGNIFICANT CHANGE UP (ref 0–8)
ESTIMATED AVERAGE GLUCOSE: 134 MG/DL — HIGH (ref 68–114)
GLUCOSE SERPL-MCNC: 110 MG/DL — HIGH (ref 70–99)
HCT VFR BLD CALC: 40.3 % — SIGNIFICANT CHANGE UP (ref 37–47)
HGB BLD-MCNC: 13.1 G/DL — SIGNIFICANT CHANGE UP (ref 12–16)
IMM GRANULOCYTES NFR BLD AUTO: 0.2 % — SIGNIFICANT CHANGE UP (ref 0.1–0.3)
INR BLD: 1.22 RATIO — SIGNIFICANT CHANGE UP (ref 0.65–1.3)
LYMPHOCYTES # BLD AUTO: 2.25 K/UL — SIGNIFICANT CHANGE UP (ref 1.2–3.4)
LYMPHOCYTES # BLD AUTO: 40.3 % — SIGNIFICANT CHANGE UP (ref 20.5–51.1)
MCHC RBC-ENTMCNC: 28.9 PG — SIGNIFICANT CHANGE UP (ref 27–31)
MCHC RBC-ENTMCNC: 32.5 G/DL — SIGNIFICANT CHANGE UP (ref 32–37)
MCV RBC AUTO: 88.8 FL — SIGNIFICANT CHANGE UP (ref 81–99)
MONOCYTES # BLD AUTO: 0.59 K/UL — SIGNIFICANT CHANGE UP (ref 0.1–0.6)
MONOCYTES NFR BLD AUTO: 10.6 % — HIGH (ref 1.7–9.3)
NEUTROPHILS # BLD AUTO: 2.42 K/UL — SIGNIFICANT CHANGE UP (ref 1.4–6.5)
NEUTROPHILS NFR BLD AUTO: 43.4 % — SIGNIFICANT CHANGE UP (ref 42.2–75.2)
NRBC # BLD: 0 /100 WBCS — SIGNIFICANT CHANGE UP (ref 0–0)
PLATELET # BLD AUTO: 428 K/UL — HIGH (ref 130–400)
POTASSIUM SERPL-MCNC: 3.8 MMOL/L — SIGNIFICANT CHANGE UP (ref 3.5–5)
POTASSIUM SERPL-SCNC: 3.8 MMOL/L — SIGNIFICANT CHANGE UP (ref 3.5–5)
PROT SERPL-MCNC: 7.2 G/DL — SIGNIFICANT CHANGE UP (ref 6–8)
PROTHROM AB SERPL-ACNC: 14 SEC — HIGH (ref 9.95–12.87)
RBC # BLD: 4.54 M/UL — SIGNIFICANT CHANGE UP (ref 4.2–5.4)
RBC # FLD: 13.5 % — SIGNIFICANT CHANGE UP (ref 11.5–14.5)
SODIUM SERPL-SCNC: 142 MMOL/L — SIGNIFICANT CHANGE UP (ref 135–146)
WBC # BLD: 5.58 K/UL — SIGNIFICANT CHANGE UP (ref 4.8–10.8)
WBC # FLD AUTO: 5.58 K/UL — SIGNIFICANT CHANGE UP (ref 4.8–10.8)

## 2022-03-08 PROCEDURE — 93010 ELECTROCARDIOGRAM REPORT: CPT

## 2022-03-08 PROCEDURE — 71046 X-RAY EXAM CHEST 2 VIEWS: CPT | Mod: 26

## 2022-03-08 NOTE — H&P PST ADULT - HISTORY OF PRESENT ILLNESS
PT PRESENTS TO PAST WITH NO SOB, CP, PALPITATIONS, DYSURIA, UTI OR URI AT PRESENT.   PT ABLE TO WALK UP 2-3 FLIGHTS OF STEPS WITH NO SOB- VERY SLOW.  AS PER THE PT, THIS IS HIS/HER COMPLETE MEDICAL AND SURGICAL HX, INCLUDING MEDICATIONS PRESCRIBED AND OVER THE COUNTER  pt denies any covid s/s, or tested positive in the past--PT AWARE OF DATE AND TIME OF COVID TESTING PRIOR TO SURGERY.   pt advised self quarantine till day of procedure  denies travel outside the [place] in the past 30 days  Anesthesia Alert  NO--Difficult Airway  NO--History of neck surgery or radiation  NO--Limited ROM of neck  NO--History of Malignant hyperthermia  NO--Personal or family history of Pseudocholinesterase deficiency  NO--Prior Anesthesia Complication  NO--Latex Allergy  NO--Loose teeth  NO--History of Rheumatoid Arthritis  NO--RJ  NO BLEEDING RISK  NO--Other_____   PT PRESENTS TO PAST WITH NO SOB, CP, PALPITATIONS, DYSURIA, UTI OR URI AT PRESENT.   PT ABLE TO WALK UP 2-3 FLIGHTS OF STEPS WITH NO SOB- VERY SLOW.  AS PER THE PT, THIS IS HIS/HER COMPLETE MEDICAL AND SURGICAL HX, INCLUDING MEDICATIONS PRESCRIBED AND OVER THE COUNTER  pt denies any covid s/s, or tested positive in the past--PT AWARE OF DATE AND TIME OF COVID TESTING PRIOR TO SURGERY.   pt advised self quarantine till day of procedure  denies travel outside the [place] in the past 30 days  Anesthesia Alert  NO--Difficult Airway  NO--History of neck surgery or radiation  NO--Limited ROM of neck  NO--History of Malignant hyperthermia  NO--Personal or family history of Pseudocholinesterase deficiency  NO--Prior Anesthesia Complication  NO--Latex Allergy  NO--Loose teeth  NO--History of Rheumatoid Arthritis  NO--RJ  NO BLEEDING RISK  yes-other- pt was scheduled for mri of her head 2/8/22  for chronic  headaches. pt cancelled because she did not  feel well.  pt came to [facility]- 2/6/22  was admitted--dx with a dvt-left leg.

## 2022-03-08 NOTE — H&P PST ADULT - REASON FOR ADMISSION
Patient is a   71   year old female presenting to PAST in preparation for   dilation and curettage possible hysteroscopy  possible myosure cervical biopsies    on  3/25/22   under  gen  anesthesia by Dr. Martinez.   Pt reports--I HAVE A FIBROID IN MY UTERUS.  I HAVE PAIN IN MY PELVIC AREA. I HAVE HAD THIS PAIN FOR A FEW YEARS.   RECENTLY THE PAIN HAS GOTTEN MUCH WORSE.   THE PAIN IS 7/10.   ITS A BURNING, THROBBING AND RADIATING AROUND MY BACK.   SOMETIMES REST WILL TAKE AWAY THE PAIN.

## 2022-03-08 NOTE — H&P PST ADULT - NSANTHOSAYNRD_GEN_A_CORE
No. RJ screening performed.  STOP BANG Legend: 0-2 = LOW Risk; 3-4 = INTERMEDIATE Risk; 5-8 = HIGH Risk

## 2022-03-08 NOTE — H&P PST ADULT - NSICDXPASTMEDICALHX_GEN_ALL_CORE_FT
PAST MEDICAL HISTORY:  DM (diabetes mellitus) NO MEDS -PER PT    DVT, lower extremity LEFT LEG-    GERD (gastroesophageal reflux disease)     HTN (hypertension)       Hypercholesterolemia      PAST MEDICAL HISTORY:  Chronic headaches     DM (diabetes mellitus) NO MEDS -PER PT    DVT, lower extremity LEFT LEG-    GERD (gastroesophageal reflux disease)     HTN (hypertension)       Hypercholesterolemia

## 2022-03-09 NOTE — PHYSICAL EXAM
[Appropriately responsive] : appropriately responsive [Alert] : alert [No Acute Distress] : no acute distress [Soft] : soft [Non-tender] : non-tender [Non-distended] : non-distended [No HSM] : No HSM [No Lesions] : no lesions [No Mass] : no mass [Vulvar Atrophy] : vulvar atrophy [Labia Majora] : normal [Labia Minora] : normal [Atrophy] : atrophy [Normal] : normal [Enlarged ___ wks] : enlarged [unfilled] ~Uweeks [Uterine Adnexae] : non-palpable [FreeTextEntry7] : Uterus not palpable through abdomen.

## 2022-03-09 NOTE — PLAN
[FreeTextEntry1] : PMB, Fibroids, recent LE DVT\par Pap/hpv done\par mammo\par will need D&C before definitive surgery to r/o uterine cancer - r/b/a d/w pt.\par \par

## 2022-03-09 NOTE — HISTORY OF PRESENT ILLNESS
[FreeTextEntry1] : 72yo P1 postmenopausal since 53yo, with PMHx of DM (noncompliant with medications), glaucoma, cataracts, DLD, GERD, chronic back pain, admitted about 2 weeks ago for LLE DVT s/p thrombectomy. In house, I saw the patient for uterine fibroids possibly compressing left iliac vessels on CT 2022 and causing right hydronephrosis. Patient reports that she has known about her fibroids since , but "no doctor has wanted to operate on her in the past." Reports history of heavy menses (prior to menopause). Receives OBGYN care with Dr. Cruz in [place]. Denies any history of post-menopausal bleeding. Denies HA, CP, SOB, nausea, vomiting, fevers, chills, vaginal discharge, pruritis, urinary symptoms, or changes in bowel habits.\par \par Ob/Gyn History:\par                  LMP - postmenopausal since 53yo                  Cycle Length - prior to menopause, was q30d, heavy and painful menses\par Denies history of ovarian cysts, abnormal paps, or STIs. Known history of multiple calcified uterine fibroids. \par Last Pap Smear - , only pap smear she has ever had, reports was negative\par Last Mammogram - never, as it is against her cultural beliefs \par Last Colonoscopy - never\par \par OBhx:\par FT  x1, reports was uncomplicated\par eTOP x4, D&C x4\par \par Denies the following: constitutional symptoms, visual symptoms, cardiovascular symptoms, respiratory symptoms, GI symptoms, musculoskeletal symptoms, skin symptoms, neurologic symptoms, hematologic symptoms, allergic symptoms, psychiatric symptoms\par Except any pertinent positives listed. \par \par PAST MEDICAL & SURGICAL HISTORY:\par DM (diabetes mellitus)\par GERD (gastroesophageal reflux disease)\par Glaucoma\par Cataracts\par DLD\par Chronic back pain\par DVT - on anticoagulation.\par \par Allergies:\par IV Contrast (Short breath)\par sulfa drugs (hives)\par \par Home Medications:\par atorvastatin\par meclizine \par \par \par FAMILY HISTORY:\par Mother - HTN, s/p workup for possible endometrial cancer however was negative\par Brother - HTN, DM\par Sister - DM\par \par SOCIAL HISTORY: Denies cigarette use, alcohol use, or illicit drug use. Remote history of heavy alcohol use. \par \par

## 2022-03-11 ENCOUNTER — NON-APPOINTMENT (OUTPATIENT)
Age: 72
End: 2022-03-11

## 2022-03-11 LAB — CYTOLOGY CVX/VAG DOC THIN PREP: NORMAL

## 2022-03-14 PROBLEM — E78.00 PURE HYPERCHOLESTEROLEMIA, UNSPECIFIED: Chronic | Status: ACTIVE | Noted: 2022-03-08

## 2022-03-14 PROBLEM — E11.9 TYPE 2 DIABETES MELLITUS WITHOUT COMPLICATIONS: Chronic | Status: ACTIVE | Noted: 2020-09-02

## 2022-03-14 PROBLEM — I10 ESSENTIAL (PRIMARY) HYPERTENSION: Chronic | Status: ACTIVE | Noted: 2020-09-02

## 2022-03-14 PROBLEM — R51.9 HEADACHE, UNSPECIFIED: Chronic | Status: ACTIVE | Noted: 2022-03-08

## 2022-03-17 ENCOUNTER — OUTPATIENT (OUTPATIENT)
Dept: OUTPATIENT SERVICES | Facility: HOSPITAL | Age: 72
LOS: 1 days | Discharge: HOME | End: 2022-03-17

## 2022-03-17 ENCOUNTER — NON-APPOINTMENT (OUTPATIENT)
Age: 72
End: 2022-03-17

## 2022-03-17 ENCOUNTER — APPOINTMENT (OUTPATIENT)
Dept: INTERNAL MEDICINE | Facility: CLINIC | Age: 72
End: 2022-03-17
Payer: MEDICARE

## 2022-03-17 VITALS
TEMPERATURE: 98 F | BODY MASS INDEX: 42.13 KG/M2 | WEIGHT: 209 LBS | HEART RATE: 87 BPM | HEIGHT: 59 IN | OXYGEN SATURATION: 98 % | DIASTOLIC BLOOD PRESSURE: 82 MMHG | SYSTOLIC BLOOD PRESSURE: 117 MMHG

## 2022-03-17 DIAGNOSIS — Z01.818 ENCOUNTER FOR OTHER PREPROCEDURAL EXAMINATION: ICD-10-CM

## 2022-03-17 DIAGNOSIS — Z98.890 OTHER SPECIFIED POSTPROCEDURAL STATES: Chronic | ICD-10-CM

## 2022-03-17 PROCEDURE — 99214 OFFICE O/P EST MOD 30 MIN: CPT | Mod: GC

## 2022-03-17 NOTE — PHYSICAL EXAM
[No Acute Distress] : no acute distress [Well Nourished] : well nourished [Well Developed] : well developed [Well-Appearing] : well-appearing [Normal Sclera/Conjunctiva] : normal sclera/conjunctiva [PERRL] : pupils equal round and reactive to light [No JVD] : no jugular venous distention [No Lymphadenopathy] : no lymphadenopathy [Normal Rate] : normal rate  [Regular Rhythm] : with a regular rhythm [Normal S1, S2] : normal S1 and S2 [No Murmur] : no murmur heard [No Abdominal Bruit] : a ~M bruit was not heard ~T in the abdomen [No Varicosities] : no varicosities [No Edema] : there was no peripheral edema [No Extremity Clubbing/Cyanosis] : no extremity clubbing/cyanosis [Soft] : abdomen soft [Non-distended] : non-distended [No CVA Tenderness] : no CVA  tenderness [No Spinal Tenderness] : no spinal tenderness [No Joint Swelling] : no joint swelling [Grossly Normal Strength/Tone] : grossly normal strength/tone [Speech Grossly Normal] : speech grossly normal [Memory Grossly Normal] : memory grossly normal [Normal Affect] : the affect was normal [de-identified] : No evidence of post-phlebitic syndrome [de-identified] : Pelvic tenderness on examination, no rebound tenderness

## 2022-03-17 NOTE — REVIEW OF SYSTEMS
[Abdominal Pain] : abdominal pain [Negative] : Psychiatric [Nausea] : no nausea [Constipation] : no constipation [Diarrhea] : diarrhea [Vomiting] : no vomiting [Heartburn] : no heartburn [Melena] : no melena [FreeTextEntry7] : Pelvic pain, probably due to uterine enlargement

## 2022-03-17 NOTE — HISTORY OF PRESENT ILLNESS
[FreeTextEntry1] : Surgical clearance [de-identified] : 72 yo F with PMH of  DM, HLD, left leg DVT, presents for surgical clearance.\par \par Patient related that she has enlarged uterus that is causing her continuous pelvic pain, with radiation to the back, urinary hesitancy, and right-sided hydronephrosis. Patient is scheduled for ablation+ curettage and possibly hysteroscopy; no abnormal uterine bleeding\par \par Patient had a DVT in her left leg requiring hospital stay and thrombectomy; patient was discharged with loading dose of Eliquis and then maintenance dose; last one was 5 days ago due to non-availability. Patient reports some pain in her left leg, residual, getting better.\par \par Patient reports no shortness of breath, chest pain, cough, palpitations, or hemoptysis. Patient is not scheduled for mammography/ colonoscopy to check if DVT is provoked.\par \par Now, she is referred for clearance and to see if Eliquis can be stopped prior to surgery.\par \par

## 2022-03-17 NOTE — ASSESSMENT
[FreeTextEntry1] : 70 yo F patient with a PMH of dyslipidemia, diabetes, enlarged uterus, and left leg DVT presents for pre-procedure clearance and to allow stopping Eliquis.\par \par # Preprocedural testing and clearance\par \par Pelvic pain and right-sided hydronephrosis, due to enlarged uterus; patient is scheduled to go for A&C, possibly hysteroscopy\par History of distal DVT last month, underwent thrombectomy and anticoagulation with Eliquis\par Not clear if provoked or unprovoked DVT; patient did not undergo further testing\par Last dose of Eliquis was 5 days ago. Pt can proceed for surgery. She is a moderate risk pt for a moderate risk procedure. Resume Eliquis after surgery. Refill given. \par Repeat US and refer to hematology. \par RTC in 6 weeks. \par \par

## 2022-03-22 ENCOUNTER — LABORATORY RESULT (OUTPATIENT)
Age: 72
End: 2022-03-22

## 2022-03-25 ENCOUNTER — OUTPATIENT (OUTPATIENT)
Dept: OUTPATIENT SERVICES | Facility: HOSPITAL | Age: 72
LOS: 1 days | Discharge: HOME | End: 2022-03-25
Payer: MEDICARE

## 2022-03-25 ENCOUNTER — TRANSCRIPTION ENCOUNTER (OUTPATIENT)
Age: 72
End: 2022-03-25

## 2022-03-25 ENCOUNTER — RESULT REVIEW (OUTPATIENT)
Age: 72
End: 2022-03-25

## 2022-03-25 VITALS
DIASTOLIC BLOOD PRESSURE: 73 MMHG | OXYGEN SATURATION: 100 % | SYSTOLIC BLOOD PRESSURE: 161 MMHG | RESPIRATION RATE: 21 BRPM | HEART RATE: 62 BPM

## 2022-03-25 VITALS
DIASTOLIC BLOOD PRESSURE: 77 MMHG | RESPIRATION RATE: 21 BRPM | SYSTOLIC BLOOD PRESSURE: 140 MMHG | HEIGHT: 59 IN | TEMPERATURE: 99 F | WEIGHT: 206.35 LBS | OXYGEN SATURATION: 96 % | HEART RATE: 97 BPM

## 2022-03-25 DIAGNOSIS — N95.0 POSTMENOPAUSAL BLEEDING: ICD-10-CM

## 2022-03-25 DIAGNOSIS — D25.9 LEIOMYOMA OF UTERUS, UNSPECIFIED: ICD-10-CM

## 2022-03-25 DIAGNOSIS — R87.619 UNSPECIFIED ABNORMAL CYTOLOGICAL FINDINGS IN SPECIMENS FROM CERVIX UTERI: ICD-10-CM

## 2022-03-25 DIAGNOSIS — Z98.890 OTHER SPECIFIED POSTPROCEDURAL STATES: Chronic | ICD-10-CM

## 2022-03-25 PROCEDURE — 88307 TISSUE EXAM BY PATHOLOGIST: CPT | Mod: 26

## 2022-03-25 PROCEDURE — 57522 CONIZATION OF CERVIX: CPT

## 2022-03-25 PROCEDURE — 58558 HYSTEROSCOPY BIOPSY: CPT

## 2022-03-25 PROCEDURE — 88305 TISSUE EXAM BY PATHOLOGIST: CPT | Mod: 26

## 2022-03-25 RX ORDER — OXYCODONE HYDROCHLORIDE 5 MG/1
5 TABLET ORAL ONCE
Refills: 0 | Status: DISCONTINUED | OUTPATIENT
Start: 2022-03-25 | End: 2022-03-25

## 2022-03-25 RX ORDER — HYDROMORPHONE HYDROCHLORIDE 2 MG/ML
0.5 INJECTION INTRAMUSCULAR; INTRAVENOUS; SUBCUTANEOUS
Refills: 0 | Status: DISCONTINUED | OUTPATIENT
Start: 2022-03-25 | End: 2022-03-25

## 2022-03-25 RX ORDER — MEPERIDINE HYDROCHLORIDE 50 MG/ML
12.5 INJECTION INTRAMUSCULAR; INTRAVENOUS; SUBCUTANEOUS ONCE
Refills: 0 | Status: DISCONTINUED | OUTPATIENT
Start: 2022-03-25 | End: 2022-03-25

## 2022-03-25 RX ORDER — ACETAMINOPHEN 500 MG
1000 TABLET ORAL ONCE
Refills: 0 | Status: COMPLETED | OUTPATIENT
Start: 2022-03-25 | End: 2022-03-25

## 2022-03-25 RX ORDER — SODIUM CHLORIDE 9 MG/ML
1000 INJECTION, SOLUTION INTRAVENOUS
Refills: 0 | Status: DISCONTINUED | OUTPATIENT
Start: 2022-03-25 | End: 2022-04-08

## 2022-03-25 RX ORDER — ONDANSETRON 8 MG/1
4 TABLET, FILM COATED ORAL ONCE
Refills: 0 | Status: DISCONTINUED | OUTPATIENT
Start: 2022-03-25 | End: 2022-04-08

## 2022-03-25 RX ORDER — METOCLOPRAMIDE HCL 10 MG
10 TABLET ORAL ONCE
Refills: 0 | Status: DISCONTINUED | OUTPATIENT
Start: 2022-03-25 | End: 2022-04-08

## 2022-03-25 RX ADMIN — SODIUM CHLORIDE 100 MILLILITER(S): 9 INJECTION, SOLUTION INTRAVENOUS at 10:34

## 2022-03-25 RX ADMIN — Medication 1000 MILLIGRAM(S): at 08:45

## 2022-03-25 RX ADMIN — Medication 1000 MILLIGRAM(S): at 10:01

## 2022-03-25 NOTE — BRIEF OPERATIVE NOTE - NSICDXBRIEFPROCEDURE_GEN_ALL_CORE_FT
PROCEDURES:  Hysteroscopy with dilation and curettage of uterus using MyoSure device 25-Mar-2022 10:02:59  Nori Fischer  Hysteroscopy, with dilation and curettage of uterus and LEEP of cervix 25-Mar-2022 10:03:08  Nori Fischer

## 2022-03-25 NOTE — ASU DISCHARGE PLAN (ADULT/PEDIATRIC) - CARE PROVIDER_API CALL
Devang Martinez)  Obstetrics and Gynecology  [address] [place] [address]  Phone: [phone]  Fax: [phone]  Follow Up Time:

## 2022-03-25 NOTE — ASU PATIENT PROFILE, ADULT - FALL HARM RISK - UNIVERSAL INTERVENTIONS
Bed in lowest position, wheels locked, appropriate side rails in place/Call bell, personal items and telephone in reach/Instruct patient to call for assistance before getting out of bed or chair/Non-slip footwear when patient is out of bed/[place] to call system/Physically safe environment - no spills, clutter or unnecessary equipment/Purposeful Proactive Rounding/Room/bathroom lighting operational, light cord in reach

## 2022-03-25 NOTE — BRIEF OPERATIVE NOTE - OPERATION/FINDINGS
enlarged 16-18 week fibroid uterus. Uterus sounded to 7cm. On hysteroscopy it was not clear if uterine cavity was entered. The cervix appeared to be elongated with a lesion in the endocervical canal, blocking entrance to the uterine cavity.

## 2022-03-25 NOTE — ASU PATIENT PROFILE, ADULT - NSICDXPASTMEDICALHX_GEN_ALL_CORE_FT
PAST MEDICAL HISTORY:  Chronic headaches     DM (diabetes mellitus) NO MEDS -PER PT    DVT, lower extremity LEFT LEG-    GERD (gastroesophageal reflux disease)     HTN (hypertension)       Hypercholesterolemia

## 2022-03-25 NOTE — ASU DISCHARGE PLAN (ADULT/PEDIATRIC) - NS MD DC FALL RISK RISK
For information on Fall & Injury Prevention, visit: [email or web address] OR  [email or web address] OR  https://www.cdc.gov/steadi/patient.html

## 2022-03-25 NOTE — ASU DISCHARGE PLAN (ADULT/PEDIATRIC) - BRAND OF COVID-19 VACCINATION
Late entry  Pt will come to lab tomorrow, informed to register at  first floor  Labs: prenatal, preeclampsia baseline labs with 24 urine collection, and HGBA1c  Pt given order for EKG and Optho. Pt states just saw her optometrist-will have records faxed and if needed will see Optho.  FTS and dating u/s pedmaryann PA   Scheduled with CDE tomorrow   PTL, FM, and COVI19 precautions given  Scheduled in 3 wks     Moderna dose 1, 2, and 3

## 2022-03-25 NOTE — BRIEF OPERATIVE NOTE - NSICDXBRIEFPREOP_GEN_ALL_CORE_FT
PRE-OP DIAGNOSIS:  Abnormal Pap smear of cervix 25-Mar-2022 10:04:11  Nori Fischer  Postmenopausal bleeding 25-Mar-2022 10:03:58  Nori Fischer  Fibroid uterus 25-Mar-2022 10:03:29  Nori Fischer

## 2022-03-25 NOTE — BRIEF OPERATIVE NOTE - NSICDXBRIEFPOSTOP_GEN_ALL_CORE_FT
POST-OP DIAGNOSIS:  Abnormal Pap smear of cervix 25-Mar-2022 10:04:14  Nori Fischer  Postmenopausal bleeding 25-Mar-2022 10:04:16  Nori Fischer  Fibroid uterus 25-Mar-2022 10:04:20  Nori Fischer

## 2022-03-25 NOTE — CHART NOTE - NSCHARTNOTEFT_GEN_A_CORE
PACU ANESTHESIA ADMISSION NOTE      Procedure: Hysteroscopy with dilation and curettage of uterus using MyoSure device    Hysteroscopy, with dilation and curettage of uterus and LEEP of cervix      Post op diagnosis:  Abnormal Pap smear of cervix    Postmenopausal bleeding    Fibroid uterus        ____  Intubated  TV:______       Rate: ______      FiO2: ______    _x___  Patent Airway    _x___  Full return of protective reflexes    _x___  Full recovery from anesthesia / back to baseline status    Vitals  SPO2:-99%  HR:-62  RR:-12  B.P:-127/60  TEMP:-97.8    Mental Status:  _x___ Awake   ___x_ Alert   _____ Drowsy   _____ Sedated    Nausea/Vomiting:  _x___  NO       ______Yes,   See Post - Op Orders         Pain Scale (0-10):  __0___    Treatment: _x___ None    __x__ See Post - Op/PCA Orders    Post - Operative Fluids:   ___ Oral   ____x See Post - Op Orders    Plan: Discharge:   ___x_Home       ____Floor     _____Critical Care    _____  Other:_________________    Comments:  Report endorsed to RN in pacu  Vitals stable  No anesthesia issues or complications noted.  Discharge to patient to  home when criteria met.

## 2022-03-28 LAB — SURGICAL PATHOLOGY STUDY: SIGNIFICANT CHANGE UP

## 2022-03-29 ENCOUNTER — APPOINTMENT (OUTPATIENT)
Dept: NEUROLOGY | Facility: CLINIC | Age: 72
End: 2022-03-29
Payer: MEDICARE

## 2022-03-29 ENCOUNTER — OUTPATIENT (OUTPATIENT)
Dept: OUTPATIENT SERVICES | Facility: HOSPITAL | Age: 72
LOS: 1 days | Discharge: HOME | End: 2022-03-29

## 2022-03-29 ENCOUNTER — NON-APPOINTMENT (OUTPATIENT)
Age: 72
End: 2022-03-29

## 2022-03-29 VITALS
HEIGHT: 59 IN | OXYGEN SATURATION: 98 % | HEART RATE: 86 BPM | TEMPERATURE: 98 F | WEIGHT: 200 LBS | SYSTOLIC BLOOD PRESSURE: 178 MMHG | BODY MASS INDEX: 40.32 KG/M2 | DIASTOLIC BLOOD PRESSURE: 96 MMHG

## 2022-03-29 DIAGNOSIS — I82.409 ACUTE EMBOLISM AND THROMBOSIS OF UNSPECIFIED DEEP VEINS OF UNSPECIFIED LOWER EXTREMITY: ICD-10-CM

## 2022-03-29 DIAGNOSIS — R42 DIZZINESS AND GIDDINESS: ICD-10-CM

## 2022-03-29 DIAGNOSIS — Z98.890 OTHER SPECIFIED POSTPROCEDURAL STATES: Chronic | ICD-10-CM

## 2022-03-29 DIAGNOSIS — Z01.818 ENCOUNTER FOR OTHER PREPROCEDURAL EXAMINATION: ICD-10-CM

## 2022-03-29 DIAGNOSIS — D25.9 LEIOMYOMA OF UTERUS, UNSPECIFIED: ICD-10-CM

## 2022-03-29 PROCEDURE — 99214 OFFICE O/P EST MOD 30 MIN: CPT | Mod: GC

## 2022-03-30 DIAGNOSIS — I82.409 ACUTE EMBOLISM AND THROMBOSIS OF UNSPECIFIED DEEP VEINS OF UNSPECIFIED LOWER EXTREMITY: ICD-10-CM

## 2022-03-30 DIAGNOSIS — R87.619 UNSPECIFIED ABNORMAL CYTOLOGICAL FINDINGS IN SPECIMENS FROM CERVIX UTERI: ICD-10-CM

## 2022-03-30 DIAGNOSIS — E78.00 PURE HYPERCHOLESTEROLEMIA, UNSPECIFIED: ICD-10-CM

## 2022-03-30 DIAGNOSIS — N72 INFLAMMATORY DISEASE OF CERVIX UTERI: ICD-10-CM

## 2022-03-30 DIAGNOSIS — I10 ESSENTIAL (PRIMARY) HYPERTENSION: ICD-10-CM

## 2022-03-30 DIAGNOSIS — Z91.041 RADIOGRAPHIC DYE ALLERGY STATUS: ICD-10-CM

## 2022-03-30 DIAGNOSIS — D25.9 LEIOMYOMA OF UTERUS, UNSPECIFIED: ICD-10-CM

## 2022-03-30 DIAGNOSIS — Z88.2 ALLERGY STATUS TO SULFONAMIDES: ICD-10-CM

## 2022-03-30 DIAGNOSIS — E11.9 TYPE 2 DIABETES MELLITUS WITHOUT COMPLICATIONS: ICD-10-CM

## 2022-03-30 DIAGNOSIS — Z79.01 LONG TERM (CURRENT) USE OF ANTICOAGULANTS: ICD-10-CM

## 2022-03-30 NOTE — HISTORY OF PRESENT ILLNESS
[FreeTextEntry1] : 71F w/ pmh DM, DLD, chronic back pain, tinnitus, headaches, vit D deficiency presenting for f/u. On last encounter was referred to get an MR brain and C-spine without contrast, which were not done. \akash Recently admitted for 5 days starting February 6th for blood clot in left leg s/p thrombectomy. Patient was discharged on Eliquis, which is currently being held due to being scheduled for D+ C and biopsy. \akash Has not been taking Tiznidine, gabapentin, and meclizine due to reading literature on side effects. \par Endorses improvement of lower back pain,which comes and goes. Endorses improvement of vertigo. Would like to hold  off on physical therapy and MR until after her procedure.

## 2022-03-30 NOTE — PHYSICAL EXAM
[General Appearance - Alert] : alert [General Appearance - Well Nourished] : well nourished [Oriented To Time, Place, And Person] : oriented to person, place, and time [Impaired Insight] : insight and judgment were intact [Affect] : the affect was normal [Memory Recent] : recent memory was not impaired [Sclera] : the sclera and conjunctiva were normal [Outer Ear] : the ears and nose were normal in appearance [Hearing Threshold Finger Rub Not [place]] : hearing was normal [Neck Appearance] : the appearance of the neck was normal [Respiration, Rhythm And Depth] : normal respiratory rhythm and effort [Auscultation Breath Sounds / Voice Sounds] : lungs were clear to auscultation bilaterally [Heart Rate And Rhythm] : heart rate was normal and rhythm regular [Heart Sounds] : normal S1 and S2 [Edema] : there was no peripheral edema [Bowel Sounds] : normal bowel sounds [Abdomen Tenderness] : non-tender [Motor Tone] : muscle strength and tone were normal [Skin Color & Pigmentation] : normal skin color and pigmentation [Motor Strength Upper Extremities Bilaterally] : strength was normal in both upper extremities [Motor Strength Lower Extremities Bilaterally] : strength was normal in both lower extremities

## 2022-03-30 NOTE — REVIEW OF SYSTEMS
[Dizziness] : dizziness [Vertigo] : vertigo [Negative] : Constitutional [Confused or Disoriented] : no confusion [Memory Lapses or Loss] : no memory loss [Facial Weakness] : no facial weakness [Arm Weakness] : no arm weakness [Hand Weakness] : no hand weakness [Leg Weakness] : no leg weakness [Seizures] : no convulsions [Anxiety] : no anxiety [Depression] : no depression [Eyesight Problems] : no eyesight problems [Chest Pain] : no chest pain [Palpitations] : no palpitations [Shortness Of Breath] : no shortness of breath [Cough] : no cough [Abdominal Pain] : no abdominal pain

## 2022-03-30 NOTE — END OF VISIT
[] : Resident [FreeTextEntry3] : Patient examined.  Vertigo improved.  PT after surgery.\par Return in 6 months or sooner if needed.

## 2022-03-30 NOTE — ASSESSMENT
[FreeTextEntry1] : #Cervicalgia with radiculopathy \par #Low back pain \par - MR brain and c spine without contrast - will hold until after D+C as per patient request \par - PT referral for low back and neck pain-will hold until after D+C as per patient request \par - Tizanidine 2mg qhs PRN and gabapentin 100mg TID stopped by patient due to potential side effects  \par - RTC in 3mo \par \par #Tinnitus, chronic.\par #Vertigo  improved.\par #Severe HA with flying \par - Counseled on use of Benadryl and decongestants to help with HA with flying \par - PT referral for vestibular rehab - will hold until after D+C as per patient request --> will resend to begin after procedure.\par - Meclizine stopped by patient due to potential side effects \par - RTC in 3mo.

## 2022-04-06 ENCOUNTER — OUTPATIENT (OUTPATIENT)
Dept: OUTPATIENT SERVICES | Facility: HOSPITAL | Age: 72
LOS: 1 days | Discharge: HOME | End: 2022-04-06
Payer: MEDICARE

## 2022-04-06 DIAGNOSIS — Z98.890 OTHER SPECIFIED POSTPROCEDURAL STATES: Chronic | ICD-10-CM

## 2022-04-06 DIAGNOSIS — I82.409 ACUTE EMBOLISM AND THROMBOSIS OF UNSPECIFIED DEEP VEINS OF UNSPECIFIED LOWER EXTREMITY: ICD-10-CM

## 2022-04-06 PROCEDURE — 93971 EXTREMITY STUDY: CPT | Mod: 26,LT

## 2022-04-11 ENCOUNTER — APPOINTMENT (OUTPATIENT)
Dept: OBGYN | Facility: CLINIC | Age: 72
End: 2022-04-11
Payer: MEDICARE

## 2022-04-11 ENCOUNTER — OUTPATIENT (OUTPATIENT)
Dept: OUTPATIENT SERVICES | Facility: HOSPITAL | Age: 72
LOS: 1 days | Discharge: HOME | End: 2022-04-11

## 2022-04-11 VITALS
SYSTOLIC BLOOD PRESSURE: 143 MMHG | HEIGHT: 59 IN | WEIGHT: 203 LBS | BODY MASS INDEX: 40.92 KG/M2 | DIASTOLIC BLOOD PRESSURE: 72 MMHG

## 2022-04-11 DIAGNOSIS — D21.9 BENIGN NEOPLASM OF CONNECTIVE AND OTHER SOFT TISSUE, UNSPECIFIED: ICD-10-CM

## 2022-04-11 DIAGNOSIS — Z98.890 OTHER SPECIFIED POSTPROCEDURAL STATES: Chronic | ICD-10-CM

## 2022-04-11 PROCEDURE — 99024 POSTOP FOLLOW-UP VISIT: CPT

## 2022-04-11 NOTE — HISTORY OF PRESENT ILLNESS
[FreeTextEntry1] : Patient is here for f/u after D&C/Hyst and Leep.\par Path - benign\par No endometrial tissue.\par On hysteroscopy - it was very difficult to get into the uterine cavity due to the fibroids.\par Patient still c/o symptoms related to her fibroids\par She has been off elequis due to the high cost but she was able to get it approved finally.\par \par

## 2022-04-11 NOTE — PHYSICAL EXAM
[Appropriately responsive] : appropriately responsive [Alert] : alert [No Acute Distress] : no acute distress [Soft] : soft [FreeTextEntry7] : uterus palpable - 4-5cm below umbilicus.

## 2022-04-11 NOTE — PLAN
[FreeTextEntry1] : Patient was counselled about the risks benefits and alternatives of having open or laparoscopic surgery. The discussion included but was not limited to: 1) Infection 2) Bleeding complications with possibility of blood transfusion 3) Adjacent organ injury 4) Fistula 5)  DVT/PE 6) Anesthetic complications 7)Possible conversion to laparotomy.\par Patient verbalized understanding and has agreed to the proposed surgery - for RATLH/BSO.\par \par Will need to be manually extracted in a contained fashion.\par \par Patient understands that there is still a risk of underlying malignancy.\par \par Patient also wants the second booster but it has only been 3.5 months since her last booster shot.\par \par I recommend getting it after the surgery.\par \par Patient needs to f/u with IM.\par \par Again I would recommend that she stop elequis before her scheduled surgery.\par \par \par

## 2022-04-13 RX ORDER — APIXABAN 5 MG/1
5 TABLET, FILM COATED ORAL
Qty: 60 | Refills: 0 | Status: DISCONTINUED | COMMUNITY
Start: 2022-03-17 | End: 2022-04-13

## 2022-04-15 ENCOUNTER — NON-APPOINTMENT (OUTPATIENT)
Age: 72
End: 2022-04-15

## 2022-04-18 ENCOUNTER — NON-APPOINTMENT (OUTPATIENT)
Age: 72
End: 2022-04-18

## 2022-04-20 DIAGNOSIS — R60.9 EDEMA, UNSPECIFIED: ICD-10-CM

## 2022-04-21 ENCOUNTER — NON-APPOINTMENT (OUTPATIENT)
Age: 72
End: 2022-04-21

## 2022-04-21 ENCOUNTER — APPOINTMENT (OUTPATIENT)
Dept: HEMATOLOGY ONCOLOGY | Facility: CLINIC | Age: 72
End: 2022-04-21
Payer: MEDICARE

## 2022-04-21 VITALS
TEMPERATURE: 98.8 F | HEIGHT: 59 IN | SYSTOLIC BLOOD PRESSURE: 140 MMHG | HEART RATE: 79 BPM | BODY MASS INDEX: 41.73 KG/M2 | DIASTOLIC BLOOD PRESSURE: 79 MMHG | WEIGHT: 207 LBS

## 2022-04-21 PROCEDURE — 99205 OFFICE O/P NEW HI 60 MIN: CPT

## 2022-04-22 NOTE — REVIEW OF SYSTEMS
[Abdominal Pain] : abdominal pain [Joint Pain] : joint pain [Joint Stiffness] : joint stiffness [Negative] : Heme/Lymph

## 2022-04-28 ENCOUNTER — NON-APPOINTMENT (OUTPATIENT)
Age: 72
End: 2022-04-28

## 2022-04-29 ENCOUNTER — NON-APPOINTMENT (OUTPATIENT)
Age: 72
End: 2022-04-29

## 2022-05-03 ENCOUNTER — NON-APPOINTMENT (OUTPATIENT)
Age: 72
End: 2022-05-03

## 2022-05-09 ENCOUNTER — NON-APPOINTMENT (OUTPATIENT)
Age: 72
End: 2022-05-09

## 2022-05-10 ENCOUNTER — OUTPATIENT (OUTPATIENT)
Dept: OUTPATIENT SERVICES | Facility: HOSPITAL | Age: 72
LOS: 1 days | Discharge: HOME | End: 2022-05-10

## 2022-05-10 VITALS
HEIGHT: 59 IN | OXYGEN SATURATION: 99 % | RESPIRATION RATE: 16 BRPM | TEMPERATURE: 97 F | HEART RATE: 76 BPM | SYSTOLIC BLOOD PRESSURE: 128 MMHG | DIASTOLIC BLOOD PRESSURE: 84 MMHG | WEIGHT: 240.97 LBS

## 2022-05-10 DIAGNOSIS — D25.9 LEIOMYOMA OF UTERUS, UNSPECIFIED: ICD-10-CM

## 2022-05-10 DIAGNOSIS — Z01.818 ENCOUNTER FOR OTHER PREPROCEDURAL EXAMINATION: ICD-10-CM

## 2022-05-10 DIAGNOSIS — Z98.890 OTHER SPECIFIED POSTPROCEDURAL STATES: Chronic | ICD-10-CM

## 2022-05-10 LAB
ALBUMIN SERPL ELPH-MCNC: 4.5 G/DL — SIGNIFICANT CHANGE UP (ref 3.5–5.2)
ALP SERPL-CCNC: 97 U/L — SIGNIFICANT CHANGE UP (ref 30–115)
ALT FLD-CCNC: 22 U/L — SIGNIFICANT CHANGE UP (ref 0–41)
ANION GAP SERPL CALC-SCNC: 14 MMOL/L — SIGNIFICANT CHANGE UP (ref 7–14)
APPEARANCE UR: CLEAR — SIGNIFICANT CHANGE UP
APTT BLD: 45.2 SEC — HIGH (ref 27–39.2)
AST SERPL-CCNC: 28 U/L — SIGNIFICANT CHANGE UP (ref 0–41)
BASOPHILS # BLD AUTO: 0.04 K/UL — SIGNIFICANT CHANGE UP (ref 0–0.2)
BASOPHILS NFR BLD AUTO: 0.8 % — SIGNIFICANT CHANGE UP (ref 0–1)
BILIRUB SERPL-MCNC: 0.3 MG/DL — SIGNIFICANT CHANGE UP (ref 0.2–1.2)
BILIRUB UR-MCNC: NEGATIVE — SIGNIFICANT CHANGE UP
BLD GP AB SCN SERPL QL: SIGNIFICANT CHANGE UP
BUN SERPL-MCNC: 18 MG/DL — SIGNIFICANT CHANGE UP (ref 10–20)
CALCIUM SERPL-MCNC: 9.4 MG/DL — SIGNIFICANT CHANGE UP (ref 8.5–10.1)
CHLORIDE SERPL-SCNC: 99 MMOL/L — SIGNIFICANT CHANGE UP (ref 98–110)
CO2 SERPL-SCNC: 22 MMOL/L — SIGNIFICANT CHANGE UP (ref 17–32)
COLOR SPEC: COLORLESS — SIGNIFICANT CHANGE UP
CREAT SERPL-MCNC: 0.8 MG/DL — SIGNIFICANT CHANGE UP (ref 0.7–1.5)
DIFF PNL FLD: NEGATIVE — SIGNIFICANT CHANGE UP
EGFR: 79 ML/MIN/1.73M2 — SIGNIFICANT CHANGE UP
EOSINOPHIL # BLD AUTO: 0.15 K/UL — SIGNIFICANT CHANGE UP (ref 0–0.7)
EOSINOPHIL NFR BLD AUTO: 3.2 % — SIGNIFICANT CHANGE UP (ref 0–8)
GLUCOSE SERPL-MCNC: 96 MG/DL — SIGNIFICANT CHANGE UP (ref 70–99)
GLUCOSE UR QL: NEGATIVE — SIGNIFICANT CHANGE UP
HCT VFR BLD CALC: 44.3 % — SIGNIFICANT CHANGE UP (ref 37–47)
HGB BLD-MCNC: 14.4 G/DL — SIGNIFICANT CHANGE UP (ref 12–16)
IMM GRANULOCYTES NFR BLD AUTO: 0 % — LOW (ref 0.1–0.3)
INR BLD: 1.5 RATIO — HIGH (ref 0.65–1.3)
KETONES UR-MCNC: NEGATIVE — SIGNIFICANT CHANGE UP
LEUKOCYTE ESTERASE UR-ACNC: NEGATIVE — SIGNIFICANT CHANGE UP
LYMPHOCYTES # BLD AUTO: 2.3 K/UL — SIGNIFICANT CHANGE UP (ref 1.2–3.4)
LYMPHOCYTES # BLD AUTO: 48.6 % — SIGNIFICANT CHANGE UP (ref 20.5–51.1)
MCHC RBC-ENTMCNC: 28.1 PG — SIGNIFICANT CHANGE UP (ref 27–31)
MCHC RBC-ENTMCNC: 32.5 G/DL — SIGNIFICANT CHANGE UP (ref 32–37)
MCV RBC AUTO: 86.4 FL — SIGNIFICANT CHANGE UP (ref 81–99)
MONOCYTES # BLD AUTO: 0.47 K/UL — SIGNIFICANT CHANGE UP (ref 0.1–0.6)
MONOCYTES NFR BLD AUTO: 9.9 % — HIGH (ref 1.7–9.3)
NEUTROPHILS # BLD AUTO: 1.77 K/UL — SIGNIFICANT CHANGE UP (ref 1.4–6.5)
NEUTROPHILS NFR BLD AUTO: 37.5 % — LOW (ref 42.2–75.2)
NITRITE UR-MCNC: NEGATIVE — SIGNIFICANT CHANGE UP
NRBC # BLD: 0 /100 WBCS — SIGNIFICANT CHANGE UP (ref 0–0)
PH UR: 6 — SIGNIFICANT CHANGE UP (ref 5–8)
PLATELET # BLD AUTO: 440 K/UL — HIGH (ref 130–400)
POTASSIUM SERPL-MCNC: 4.2 MMOL/L — SIGNIFICANT CHANGE UP (ref 3.5–5)
POTASSIUM SERPL-SCNC: 4.2 MMOL/L — SIGNIFICANT CHANGE UP (ref 3.5–5)
PROT SERPL-MCNC: 7.8 G/DL — SIGNIFICANT CHANGE UP (ref 6–8)
PROT UR-MCNC: NEGATIVE — SIGNIFICANT CHANGE UP
PROTHROM AB SERPL-ACNC: 17.2 SEC — HIGH (ref 9.95–12.87)
RBC # BLD: 5.13 M/UL — SIGNIFICANT CHANGE UP (ref 4.2–5.4)
RBC # FLD: 12.9 % — SIGNIFICANT CHANGE UP (ref 11.5–14.5)
SODIUM SERPL-SCNC: 135 MMOL/L — SIGNIFICANT CHANGE UP (ref 135–146)
SP GR SPEC: 1.01 — LOW (ref 1.01–1.03)
UROBILINOGEN FLD QL: SIGNIFICANT CHANGE UP
WBC # BLD: 4.73 K/UL — LOW (ref 4.8–10.8)
WBC # FLD AUTO: 4.73 K/UL — LOW (ref 4.8–10.8)

## 2022-05-10 RX ORDER — APIXABAN 2.5 MG/1
1 TABLET, FILM COATED ORAL
Qty: 0 | Refills: 0 | DISCHARGE

## 2022-05-10 NOTE — H&P PST ADULT - NSICDXPASTMEDICALHX_GEN_ALL_CORE_FT
PAST MEDICAL HISTORY:  Chronic headaches     DM (diabetes mellitus) NO MEDS -PER PT    DVT, lower extremity LEFT LEG-    GERD (gastroesophageal reflux disease)     HTN (hypertension)       Hypercholesterolemia      PAST MEDICAL HISTORY:  Chronic headaches     DM (diabetes mellitus) NO MEDS -PER PT    DVT, lower extremity LEFT LEG- 2/22    GERD (gastroesophageal reflux disease)     HTN (hypertension)       Hypercholesterolemia

## 2022-05-10 NOTE — H&P PST ADULT - HISTORY OF PRESENT ILLNESS
72 Y/O FEMALE PRESENTS TO PAST WITH C/O UTERINE FIBROID PT C/O               PT NOW FOR SCHEDULED PROCEDURE ( ROBOTIC ASSISTED TOTAL LAPAROSCOPIC HYSTERECTOMY, BSO POSS CYSTO ) . PT DENIES ANY CP SOB PALP COUGH DYSURIA FEVER URI. PT ABLE TO KARO 1-2 FOS W/O SOB  pt denies any covid s/s, or tested positive in the past  pt advised self quarantine till day of procedure  Anesthesia Alert  NO--Difficult Airway  NO--History of neck surgery or radiation  NO--Limited ROM of neck  NO--History of Malignant hyperthermia  NO--Personal or family history of Pseudocholinesterase deficiency.  NO--Prior Anesthesia Complication  NO--Latex Allergy  NO--Loose teeth  NO--History of Rheumatoid Arthritis  NO--RJ  NO--Bleeding risk  NO--Other_____   70 Y/O FEMALE PRESENTS TO PAST WITH C/O UTERINE FIBROID PT C/O ENLARGED FIBROID, DENIES ANY ABD PAIN OR BLEEDING  PT NOW FOR SCHEDULED PROCEDURE ( ROBOTIC ASSISTED TOTAL LAPAROSCOPIC HYSTERECTOMY, BSO POSS CYSTO ) . PT DENIES ANY CP SOB PALP COUGH DYSURIA FEVER URI. PT ABLE TO KARO 1-2 FOS W/O SOB  pt denies any covid s/s, or tested positive in the past  pt advised self quarantine till day of procedure  Anesthesia Alert  NO--Difficult Airway  NO--History of neck surgery or radiation  NO--Limited ROM of neck  NO--History of Malignant hyperthermia  NO--Personal or family history of Pseudocholinesterase deficiency.  NO--Prior Anesthesia Complication  NO--Latex Allergy  NO--Loose teeth  NO--History of Rheumatoid Arthritis  NO--RJ  NO--Bleeding risk  NO--Other_____

## 2022-05-12 ENCOUNTER — NON-APPOINTMENT (OUTPATIENT)
Age: 72
End: 2022-05-12

## 2022-05-13 LAB
CULTURE RESULTS: NO GROWTH — SIGNIFICANT CHANGE UP
SPECIMEN SOURCE: SIGNIFICANT CHANGE UP

## 2022-05-27 NOTE — ASU PATIENT PROFILE, ADULT - NSICDXPASTMEDICALHX_GEN_ALL_CORE_FT
PAST MEDICAL HISTORY:  Chronic headaches     DM (diabetes mellitus) NO MEDS -PER PT    DVT, lower extremity LEFT LEG- 2/22    GERD (gastroesophageal reflux disease)     HTN (hypertension)       Hypercholesterolemia

## 2022-05-27 NOTE — ASU PATIENT PROFILE, ADULT - FALL HARM RISK - RISK INTERVENTIONS
Assistance OOB with selected safe patient handling equipment/Assistance with ambulation/Communicate Fall Risk and Risk Factors to all staff, patient, and family/Reinforce activity limits and safety measures with patient and family/Sit up slowly, dangle for a short time, stand at bedside before walking/Use of alarms - bed, chair and/or voice tab/Visual Cue: Yellow wristband/Bed in lowest position, wheels locked, appropriate side rails in place/Call bell, personal items and telephone in reach/Instruct patient to call for assistance before getting out of bed or chair/Non-slip footwear when patient is out of bed/[place] to call system/Physically safe environment - no spills, clutter or unnecessary equipment/Purposeful Proactive Rounding/Room/bathroom lighting operational, light cord in reach

## 2022-05-28 ENCOUNTER — LABORATORY RESULT (OUTPATIENT)
Age: 72
End: 2022-05-28

## 2022-05-31 ENCOUNTER — OUTPATIENT (OUTPATIENT)
Dept: OUTPATIENT SERVICES | Facility: HOSPITAL | Age: 72
LOS: 1 days | Discharge: HOME | End: 2022-05-31
Payer: MEDICARE

## 2022-05-31 ENCOUNTER — RESULT REVIEW (OUTPATIENT)
Age: 72
End: 2022-05-31

## 2022-05-31 ENCOUNTER — TRANSCRIPTION ENCOUNTER (OUTPATIENT)
Age: 72
End: 2022-05-31

## 2022-05-31 VITALS
RESPIRATION RATE: 15 BRPM | OXYGEN SATURATION: 99 % | TEMPERATURE: 97 F | HEART RATE: 72 BPM | DIASTOLIC BLOOD PRESSURE: 75 MMHG | WEIGHT: 240.97 LBS | HEIGHT: 59 IN | SYSTOLIC BLOOD PRESSURE: 133 MMHG

## 2022-05-31 DIAGNOSIS — Z98.890 OTHER SPECIFIED POSTPROCEDURAL STATES: Chronic | ICD-10-CM

## 2022-05-31 LAB
ALBUMIN SERPL ELPH-MCNC: 4.2 G/DL — SIGNIFICANT CHANGE UP (ref 3.5–5.2)
ALP SERPL-CCNC: 89 U/L — SIGNIFICANT CHANGE UP (ref 30–115)
ALT FLD-CCNC: 21 U/L — SIGNIFICANT CHANGE UP (ref 0–41)
ANION GAP SERPL CALC-SCNC: 13 MMOL/L — SIGNIFICANT CHANGE UP (ref 7–14)
AST SERPL-CCNC: 28 U/L — SIGNIFICANT CHANGE UP (ref 0–41)
BASOPHILS # BLD AUTO: 0.03 K/UL — SIGNIFICANT CHANGE UP (ref 0–0.2)
BASOPHILS NFR BLD AUTO: 0.2 % — SIGNIFICANT CHANGE UP (ref 0–1)
BILIRUB SERPL-MCNC: 1.1 MG/DL — SIGNIFICANT CHANGE UP (ref 0.2–1.2)
BUN SERPL-MCNC: 16 MG/DL — SIGNIFICANT CHANGE UP (ref 10–20)
CALCIUM SERPL-MCNC: 9.3 MG/DL — SIGNIFICANT CHANGE UP (ref 8.5–10.1)
CHLORIDE SERPL-SCNC: 103 MMOL/L — SIGNIFICANT CHANGE UP (ref 98–110)
CO2 SERPL-SCNC: 23 MMOL/L — SIGNIFICANT CHANGE UP (ref 17–32)
CREAT SERPL-MCNC: 0.8 MG/DL — SIGNIFICANT CHANGE UP (ref 0.7–1.5)
EGFR: 79 ML/MIN/1.73M2 — SIGNIFICANT CHANGE UP
EOSINOPHIL # BLD AUTO: 0.01 K/UL — SIGNIFICANT CHANGE UP (ref 0–0.7)
EOSINOPHIL NFR BLD AUTO: 0.1 % — SIGNIFICANT CHANGE UP (ref 0–8)
GLUCOSE BLDC GLUCOMTR-MCNC: 125 MG/DL — HIGH (ref 70–99)
GLUCOSE BLDC GLUCOMTR-MCNC: 126 MG/DL — HIGH (ref 70–99)
GLUCOSE BLDC GLUCOMTR-MCNC: 90 MG/DL — SIGNIFICANT CHANGE UP (ref 70–99)
GLUCOSE SERPL-MCNC: 141 MG/DL — HIGH (ref 70–99)
HCT VFR BLD CALC: 42.5 % — SIGNIFICANT CHANGE UP (ref 37–47)
HGB BLD-MCNC: 14 G/DL — SIGNIFICANT CHANGE UP (ref 12–16)
IMM GRANULOCYTES NFR BLD AUTO: 0.3 % — SIGNIFICANT CHANGE UP (ref 0.1–0.3)
LYMPHOCYTES # BLD AUTO: 1.74 K/UL — SIGNIFICANT CHANGE UP (ref 1.2–3.4)
LYMPHOCYTES # BLD AUTO: 12.9 % — LOW (ref 20.5–51.1)
MAGNESIUM SERPL-MCNC: 1.9 MG/DL — SIGNIFICANT CHANGE UP (ref 1.8–2.4)
MCHC RBC-ENTMCNC: 28.7 PG — SIGNIFICANT CHANGE UP (ref 27–31)
MCHC RBC-ENTMCNC: 32.9 G/DL — SIGNIFICANT CHANGE UP (ref 32–37)
MCV RBC AUTO: 87.3 FL — SIGNIFICANT CHANGE UP (ref 81–99)
MONOCYTES # BLD AUTO: 0.32 K/UL — SIGNIFICANT CHANGE UP (ref 0.1–0.6)
MONOCYTES NFR BLD AUTO: 2.4 % — SIGNIFICANT CHANGE UP (ref 1.7–9.3)
NEUTROPHILS # BLD AUTO: 11.34 K/UL — HIGH (ref 1.4–6.5)
NEUTROPHILS NFR BLD AUTO: 84.1 % — HIGH (ref 42.2–75.2)
NRBC # BLD: 0 /100 WBCS — SIGNIFICANT CHANGE UP (ref 0–0)
PHOSPHATE SERPL-MCNC: 2.7 MG/DL — SIGNIFICANT CHANGE UP (ref 2.1–4.9)
PLATELET # BLD AUTO: 374 K/UL — SIGNIFICANT CHANGE UP (ref 130–400)
POTASSIUM SERPL-MCNC: 3.8 MMOL/L — SIGNIFICANT CHANGE UP (ref 3.5–5)
POTASSIUM SERPL-SCNC: 3.8 MMOL/L — SIGNIFICANT CHANGE UP (ref 3.5–5)
PROT SERPL-MCNC: 7.1 G/DL — SIGNIFICANT CHANGE UP (ref 6–8)
RBC # BLD: 4.87 M/UL — SIGNIFICANT CHANGE UP (ref 4.2–5.4)
RBC # FLD: 13.1 % — SIGNIFICANT CHANGE UP (ref 11.5–14.5)
SODIUM SERPL-SCNC: 139 MMOL/L — SIGNIFICANT CHANGE UP (ref 135–146)
WBC # BLD: 13.48 K/UL — HIGH (ref 4.8–10.8)
WBC # FLD AUTO: 13.48 K/UL — HIGH (ref 4.8–10.8)

## 2022-05-31 PROCEDURE — S2900 ROBOTIC SURGICAL SYSTEM: CPT | Mod: NC

## 2022-05-31 PROCEDURE — 88307 TISSUE EXAM BY PATHOLOGIST: CPT | Mod: 26

## 2022-05-31 PROCEDURE — 88305 TISSUE EXAM BY PATHOLOGIST: CPT | Mod: 26

## 2022-05-31 PROCEDURE — 58573 TLH W/T/O UTERUS OVER 250 G: CPT | Mod: 79

## 2022-05-31 RX ORDER — ONDANSETRON 8 MG/1
4 TABLET, FILM COATED ORAL ONCE
Refills: 0 | Status: DISCONTINUED | OUTPATIENT
Start: 2022-05-31 | End: 2022-05-31

## 2022-05-31 RX ORDER — HYDROMORPHONE HYDROCHLORIDE 2 MG/ML
0.5 INJECTION INTRAMUSCULAR; INTRAVENOUS; SUBCUTANEOUS
Refills: 0 | Status: DISCONTINUED | OUTPATIENT
Start: 2022-05-31 | End: 2022-05-31

## 2022-05-31 RX ORDER — METOCLOPRAMIDE HCL 10 MG
10 TABLET ORAL ONCE
Refills: 0 | Status: COMPLETED | OUTPATIENT
Start: 2022-05-31 | End: 2022-05-31

## 2022-05-31 RX ORDER — SIMETHICONE 80 MG/1
80 TABLET, CHEWABLE ORAL DAILY
Refills: 0 | Status: DISCONTINUED | OUTPATIENT
Start: 2022-05-31 | End: 2022-06-02

## 2022-05-31 RX ORDER — ONDANSETRON 8 MG/1
4 TABLET, FILM COATED ORAL EVERY 6 HOURS
Refills: 0 | Status: DISCONTINUED | OUTPATIENT
Start: 2022-05-31 | End: 2022-06-02

## 2022-05-31 RX ORDER — IBUPROFEN 200 MG
600 TABLET ORAL EVERY 6 HOURS
Refills: 0 | Status: DISCONTINUED | OUTPATIENT
Start: 2022-05-31 | End: 2022-06-02

## 2022-05-31 RX ORDER — HYDROMORPHONE HYDROCHLORIDE 2 MG/ML
0.25 INJECTION INTRAMUSCULAR; INTRAVENOUS; SUBCUTANEOUS
Refills: 0 | Status: DISCONTINUED | OUTPATIENT
Start: 2022-05-31 | End: 2022-05-31

## 2022-05-31 RX ORDER — SODIUM CHLORIDE 9 MG/ML
1000 INJECTION, SOLUTION INTRAVENOUS
Refills: 0 | Status: DISCONTINUED | OUTPATIENT
Start: 2022-05-31 | End: 2022-06-02

## 2022-05-31 RX ORDER — ACETAMINOPHEN 500 MG
975 TABLET ORAL EVERY 6 HOURS
Refills: 0 | Status: DISCONTINUED | OUTPATIENT
Start: 2022-05-31 | End: 2022-06-02

## 2022-05-31 RX ORDER — APIXABAN 2.5 MG/1
5 TABLET, FILM COATED ORAL EVERY 12 HOURS
Refills: 0 | Status: DISCONTINUED | OUTPATIENT
Start: 2022-06-01 | End: 2022-06-02

## 2022-05-31 RX ORDER — METOCLOPRAMIDE HCL 10 MG
5 TABLET ORAL ONCE
Refills: 0 | Status: DISCONTINUED | OUTPATIENT
Start: 2022-05-31 | End: 2022-05-31

## 2022-05-31 RX ORDER — SODIUM CHLORIDE 9 MG/ML
1000 INJECTION, SOLUTION INTRAVENOUS
Refills: 0 | Status: DISCONTINUED | OUTPATIENT
Start: 2022-05-31 | End: 2022-05-31

## 2022-05-31 RX ORDER — OXYCODONE HYDROCHLORIDE 5 MG/1
5 TABLET ORAL EVERY 6 HOURS
Refills: 0 | Status: DISCONTINUED | OUTPATIENT
Start: 2022-05-31 | End: 2022-06-02

## 2022-05-31 RX ORDER — SENNA PLUS 8.6 MG/1
2 TABLET ORAL AT BEDTIME
Refills: 0 | Status: DISCONTINUED | OUTPATIENT
Start: 2022-05-31 | End: 2022-06-02

## 2022-05-31 RX ADMIN — HYDROMORPHONE HYDROCHLORIDE 0.25 MILLIGRAM(S): 2 INJECTION INTRAMUSCULAR; INTRAVENOUS; SUBCUTANEOUS at 13:45

## 2022-05-31 RX ADMIN — Medication 600 MILLIGRAM(S): at 23:22

## 2022-05-31 RX ADMIN — SODIUM CHLORIDE 125 MILLILITER(S): 9 INJECTION, SOLUTION INTRAVENOUS at 17:40

## 2022-05-31 RX ADMIN — HYDROMORPHONE HYDROCHLORIDE 0.25 MILLIGRAM(S): 2 INJECTION INTRAMUSCULAR; INTRAVENOUS; SUBCUTANEOUS at 15:35

## 2022-05-31 RX ADMIN — ONDANSETRON 4 MILLIGRAM(S): 8 TABLET, FILM COATED ORAL at 13:20

## 2022-05-31 RX ADMIN — HYDROMORPHONE HYDROCHLORIDE 0.25 MILLIGRAM(S): 2 INJECTION INTRAMUSCULAR; INTRAVENOUS; SUBCUTANEOUS at 16:05

## 2022-05-31 RX ADMIN — SODIUM CHLORIDE 100 MILLILITER(S): 9 INJECTION, SOLUTION INTRAVENOUS at 14:47

## 2022-05-31 RX ADMIN — SENNA PLUS 2 TABLET(S): 8.6 TABLET ORAL at 23:22

## 2022-05-31 RX ADMIN — Medication 975 MILLIGRAM(S): at 20:43

## 2022-05-31 RX ADMIN — Medication 10 MILLIGRAM(S): at 19:10

## 2022-05-31 RX ADMIN — HYDROMORPHONE HYDROCHLORIDE 0.25 MILLIGRAM(S): 2 INJECTION INTRAMUSCULAR; INTRAVENOUS; SUBCUTANEOUS at 14:15

## 2022-05-31 NOTE — CHART NOTE - NSCHARTNOTEFT_GEN_A_CORE
ANESTHESIA to PACU NOTE      ____ Intubated  TV:______       Rate: ______      FiO2: ______    __x__ Patent Airway    _x___ Full return of protective reflexes    ____ Full recovery from anesthesia / sedation to baseline status    Vitals:  HR 74  /77  RR 15  O2sat. 100%  Temp: 98.2F      Mental Status:  __x__ Awake   _x____ Alert   _____ Drowsy   _____ Sedated    Nausea/Vomiting: _x___ Yes, See Post - Op Orders      ____ No    Pain Scale (0-10): _____    Treatment: _x___ None    ____ See Post - Op/PCA Orders    Post - Operative Fluids:   ____ Oral   _x___ See Post - Op Orders    Plan:  Discharge to:   ____Home       __x___Floor      _____Critical Care    _____ Other:_________________    Comments: s/p general anesthesia with ETT. No anesthesia complications. Pt's condition is stable in PACU. Full report is given to PACU RN.

## 2022-05-31 NOTE — CHART NOTE - NSCHARTNOTEFT_GEN_A_CORE
ANESTHESIA to PACU NOTE      ____ Intubated  TV:______       Rate: ______      FiO2: ______    __x__ Patent Airway    ___x_ Full return of protective reflexes    ____ Full recovery from anesthesia / sedation to baseline status    Vitals:  HR 81  /55  RR 15  O2sat. 97%  Temp: 98.1F      Mental Status:  __x__ Awake   _x____ Alert   _____ Drowsy   _____ Sedated    Nausea/Vomiting: ____ Yes, See Post - Op Orders      __x__ No    Pain Scale (0-10): _____    Treatment: x____ None    ____ See Post - Op/PCA Orders    Post - Operative Fluids:   ____ Oral   ___x_ See Post - Op Orders    Plan:  Discharge to:   ___x_Home       _____Floor      _____Critical Care    _____ Other:_________________    Comments: s/p general anesthesia with ETT. No anesthesia complications. Pt's condition is stable in PACU. Full report is given to PACU RN.

## 2022-05-31 NOTE — BRIEF OPERATIVE NOTE - NSICDXBRIEFPROCEDURE_GEN_ALL_CORE_FT
PROCEDURES:  Robot-assisted laparoscopic total hysterectomy with bilateral salpingo-oophorectomy (BSO) and cystoscopy using da Butch Xi 31-May-2022 13:20:00  Solange Simmons  
27-Feb-2022 00:52

## 2022-05-31 NOTE — BRIEF OPERATIVE NOTE - OPERATION/FINDINGS
enlarged multi-fibroid uterus 18wk size, normal tubes and ovaries bilaterally  no additional abdominal pathology  no cervical or vaginal lesions  on cystoscopy no bladder injury, bilateral ureteral jets visualized enlarged multi-fibroid uterus 18wk size, weigh = 808 grams, normal tubes and ovaries bilaterally  no additional abdominal pathology  On cystoscopy - normal bladder mucosa. No injuries noted. Both ureteral ostias visualized with normal efflux of urine.  no cervical or vaginal lesions  on cystoscopy no bladder injury, bilateral ureteral jets visualized

## 2022-06-01 ENCOUNTER — TRANSCRIPTION ENCOUNTER (OUTPATIENT)
Age: 72
End: 2022-06-01

## 2022-06-01 VITALS
HEART RATE: 62 BPM | TEMPERATURE: 98 F | RESPIRATION RATE: 19 BRPM | DIASTOLIC BLOOD PRESSURE: 54 MMHG | SYSTOLIC BLOOD PRESSURE: 107 MMHG

## 2022-06-01 LAB
GLUCOSE BLDC GLUCOMTR-MCNC: 93 MG/DL — SIGNIFICANT CHANGE UP (ref 70–99)
SURGICAL PATHOLOGY STUDY: SIGNIFICANT CHANGE UP

## 2022-06-01 RX ORDER — ACETAMINOPHEN 500 MG
2 TABLET ORAL
Qty: 112 | Refills: 0
Start: 2022-06-01 | End: 2022-06-14

## 2022-06-01 RX ORDER — SIMETHICONE 80 MG/1
1 TABLET, CHEWABLE ORAL
Qty: 56 | Refills: 0
Start: 2022-06-01 | End: 2022-06-14

## 2022-06-01 RX ORDER — DOCUSATE SODIUM 100 MG
1 CAPSULE ORAL
Qty: 28 | Refills: 0
Start: 2022-06-01 | End: 2022-06-14

## 2022-06-01 RX ORDER — OXYCODONE HYDROCHLORIDE 5 MG/1
1 TABLET ORAL
Qty: 5 | Refills: 0
Start: 2022-06-01

## 2022-06-01 RX ORDER — IBUPROFEN 200 MG
1 TABLET ORAL
Qty: 56 | Refills: 0
Start: 2022-06-01 | End: 2022-06-14

## 2022-06-01 RX ADMIN — Medication 600 MILLIGRAM(S): at 06:22

## 2022-06-01 RX ADMIN — APIXABAN 5 MILLIGRAM(S): 2.5 TABLET, FILM COATED ORAL at 06:23

## 2022-06-01 RX ADMIN — SENNA PLUS 2 TABLET(S): 8.6 TABLET ORAL at 21:24

## 2022-06-01 RX ADMIN — Medication 600 MILLIGRAM(S): at 11:40

## 2022-06-01 RX ADMIN — Medication 975 MILLIGRAM(S): at 07:56

## 2022-06-01 RX ADMIN — Medication 975 MILLIGRAM(S): at 02:52

## 2022-06-01 RX ADMIN — Medication 975 MILLIGRAM(S): at 17:03

## 2022-06-01 RX ADMIN — Medication 975 MILLIGRAM(S): at 11:40

## 2022-06-01 RX ADMIN — Medication 600 MILLIGRAM(S): at 17:02

## 2022-06-01 RX ADMIN — OXYCODONE HYDROCHLORIDE 5 MILLIGRAM(S): 5 TABLET ORAL at 22:14

## 2022-06-01 RX ADMIN — Medication 975 MILLIGRAM(S): at 07:52

## 2022-06-01 RX ADMIN — Medication 600 MILLIGRAM(S): at 17:03

## 2022-06-01 RX ADMIN — APIXABAN 5 MILLIGRAM(S): 2.5 TABLET, FILM COATED ORAL at 17:03

## 2022-06-01 NOTE — DISCHARGE NOTE PROVIDER - NSDCMRMEDTOKEN_GEN_ALL_CORE_FT
Colace 100 mg oral capsule: 1 cap(s) orally 2 times a day   Eliquis 5 mg oral tablet: 1 tab(s) orally 2 times a day  ibuprofen 600 mg oral tablet: 1 tab(s) orally every 6 hours   oxyCODONE 5 mg oral tablet: 1 tab(s) orally every 6 hours MDD:4  simethicone 80 mg oral tablet, chewable: 1 tab(s) chewed every 6 hours   Tylenol 325 mg oral tablet: 2 tab(s) orally every 6 hours

## 2022-06-01 NOTE — DISCHARGE NOTE NURSING/CASE MANAGEMENT/SOCIAL WORK - NSDCPEFALRISK_GEN_ALL_CORE
99
For information on Fall & Injury Prevention, visit: [email or web address] OR  [email or web address] OR  https://www.cdc.gov/steadi/patient.html

## 2022-06-01 NOTE — DISCHARGE NOTE PROVIDER - NSDCFUADDINST_GEN_ALL_CORE_FT
Nothing in the vagina for at least 6-8 weeks (no sex, no tampons, no douching). Avoid tub baths, you may shower.    If you have a fever of 100.4F or greater, severe vaginal bleeding, or severe abdominal pain, call your Ob/Gyn or come to the emergency department immediately.    Walking and stairs are OK  NO heavy lifting or straining  keep abdomen dry after showers  may wear abdominal binder as often as you desire

## 2022-06-01 NOTE — DISCHARGE NOTE PROVIDER - HOSPITAL COURSE
06-01-22 @ 06:51    HPI:      PAST MEDICAL & SURGICAL HISTORY:  DM (diabetes mellitus)  NO MEDS -PER PT      HTN (hypertension)              GERD (gastroesophageal reflux disease)      Hypercholesterolemia      DVT, lower extremity  LEFT LEG- 2/22      Chronic headaches      History of dilation and curettage  X3   d&amp; c hysteroscopy, leep- 3/22          COURSE: underwent uncomplicated RATLH/BSO, cysto EBL 100cc for fibroid uterus  had temp POD0 x1 which resolved  she ambulated, voided, passed flatus and tolerated regular diet  discharged POD1         LABS:                        14.0   13.48 )-----------( 374      ( 31 May 2022 14:15 )             42.5     Magnesium, Serum: 1.9 mg/dL (05-31 @ 14:15)    05-31-22 @ 14:15      139  |  103  |  16  ----------------------------<  141<H>  3.8   |  23  |  0.8        Ca    9.3      31 May 2022 14:15  Phos  2.7     05-31  Mg     1.9     05-31    TPro  7.1  /  Alb  4.2  /  TBili  1.1  /  DBili  x   /  AST  28  /  ALT  21  /  AlkPhos  89  05-31-22 @ 14:15        Allergies    IV Contrast (Short breath)  sulfa drugs (Other)    Intolerances             06-01-22 @ 06:51    HPI:      PAST MEDICAL & SURGICAL HISTORY:  DM (diabetes mellitus)  NO MEDS -PER PT      HTN (hypertension)              GERD (gastroesophageal reflux disease)      Hypercholesterolemia      DVT, lower extremity  LEFT LEG- 2/22      Chronic headaches      History of dilation and curettage  X3   d&amp; c hysteroscopy, leep- 3/22          COURSE: underwent uncomplicated RATLH/BSO, cysto EBL 100cc for fibroid uterus  had temp POD0 x1 which resolved  she ambulated, voided, passed flatus and tolerated regular diet  discharged POD2         LABS:                        14.0   13.48 )-----------( 374      ( 31 May 2022 14:15 )             42.5     Magnesium, Serum: 1.9 mg/dL (05-31 @ 14:15)    05-31-22 @ 14:15      139  |  103  |  16  ----------------------------<  141<H>  3.8   |  23  |  0.8        Ca    9.3      31 May 2022 14:15  Phos  2.7     05-31  Mg     1.9     05-31    TPro  7.1  /  Alb  4.2  /  TBili  1.1  /  DBili  x   /  AST  28  /  ALT  21  /  AlkPhos  89  05-31-22 @ 14:15        Allergies    IV Contrast (Short breath)  sulfa drugs (Other)    Intolerances

## 2022-06-01 NOTE — PATIENT PROFILE ADULT - FALL HARM RISK - HARM RISK INTERVENTIONS

## 2022-06-01 NOTE — DISCHARGE NOTE NURSING/CASE MANAGEMENT/SOCIAL WORK - PATIENT PORTAL LINK FT
You can access the FollowMyHealth Patient Portal offered by [facility] by registering at the following website: [email or web address]. By joining [facility]’s FollowMyHealth portal, you will also be able to view your health information using other applications (apps) compatible with our system.

## 2022-06-01 NOTE — PROVIDER CONTACT NOTE (OTHER) - SITUATION
Patient returned to floor from PACU. Patient vitals 124/58, 97, 18 spo2 98. Temp 100.6. Gyno made aware, ice packs applied Tylenol adm as ordered. Continue to monitor

## 2022-06-01 NOTE — DISCHARGE NOTE PROVIDER - CARE PROVIDER_API CALL
Devang Martinez)  Obstetrics and Gynecology  [address] [place] [address]  Phone: [phone]  Fax: [phone]  Follow Up Time: 2 weeks

## 2022-06-02 RX ADMIN — Medication 975 MILLIGRAM(S): at 01:25

## 2022-06-02 RX ADMIN — APIXABAN 5 MILLIGRAM(S): 2.5 TABLET, FILM COATED ORAL at 05:32

## 2022-06-02 RX ADMIN — Medication 600 MILLIGRAM(S): at 05:38

## 2022-06-02 RX ADMIN — Medication 975 MILLIGRAM(S): at 05:32

## 2022-06-02 RX ADMIN — Medication 600 MILLIGRAM(S): at 01:24

## 2022-06-02 NOTE — PROGRESS NOTE ADULT - ASSESSMENT
70 yo P1, PMH DVT (2/22), GERD, HTN, DM, DLD w/ fibroid uteurs, s/p RATLH/BSO, cysto, EBL 100cc, POD#0 doing well,  -ERAS protocol  -pain management per protocol  -regular diet  -SCD+eliquis  -de la rosa until AM (f/u UO)    Dr. Martinez and Dr. Ray to be aware
70 yo P1, PMH DVT (2/22), GERD, HTN, DM, DLD w/ fibroid uteurs, s/p RATLH/BSO, cysto, EBL 100cc, POD#2 doing well.    - pain management per ERAS  - regular diet, PO hydration  - monitor vitals and bleeding, afebrile >24hrs  - encourage ambulation and incentive spirometry  - SCDs and eliquis for DVT ppx  - discharge today    Dr. Piper to be made aware.   
70 yo P1, PMH DVT (2/22), GERD, HTN, DM, DLD w/ fibroid uteurs, s/p RATLH/BSO, cysto, EBL 100cc, POD#1 doing well    - f/u vitals and temperature, last temp @2030   -ERAS protocol  -pain management per protocol  -regular diet  -SCD+eliquis  - TOV 1230  - ambulate as tolerated      to be made aware,  aware

## 2022-06-02 NOTE — PROGRESS NOTE ADULT - SUBJECTIVE AND OBJECTIVE BOX
GYN PGY2 Note:  Patient seen and examined bedside. Had fever of 100.6 @2030 last night. Patient reports room temperature was very hot yesterday as air conditioner was not working. Currently denies feeling of fever or chills.  Reports pain well controlled. Not yet OOB. Tolerating regular diet. Denies flatus. Denies dizziness, lightheadedness, SOB, or palpitations. Denies nausea, vomiting.    PAST MEDICAL & SURGICAL HISTORY:  DM (diabetes mellitus)  NO MEDS -PER PT      HTN (hypertension)              GERD (gastroesophageal reflux disease)      Hypercholesterolemia      DVT, lower extremity  LEFT LEG- 2/22      Chronic headaches      History of dilation and curettage  X3   d&amp; c hysteroscopy, leep- 3/22          Physical Exam  Vital Signs Last 24 Hrs  T(C): 36.6 (01 Jun 2022 04:17), Max: 38.1 (31 May 2022 20:30)  T(F): 97.8 (01 Jun 2022 04:17), Max: 100.6 (31 May 2022 20:30)  HR: 66 (01 Jun 2022 04:17) (64 - 97)  BP: 125/63 (01 Jun 2022 04:17) (124/57 - 180/84)  RR: 18 (01 Jun 2022 04:17) (15 - 27)  SpO2: 99% (01 Jun 2022 04:17) (96% - 100%)    Gen: AAOx3, NAD  CV: RRR. No murmors gallops or rubs.  Pulm: CTAB. No wheezes or rales.  Ext: No calf tenderness, no swelling.   Abd: Soft, nontender, nondistended, BS+  Incision: Laparoscopic incision(s) clean, dry intact. Steris in place. No surrounding edema or erythema.     Urine output: 7949-4483 100cc, [id number] 400cc    Labs:                        14.0   13.48 )-----------( 374      ( 31 May 2022 14:15 )             42.5        MEDICATIONS  (STANDING):  acetaminophen     Tablet .. 975 milliGRAM(s) Oral every 6 hours  apixaban 5 milliGRAM(s) Oral every 12 hours  ibuprofen  Tablet. 600 milliGRAM(s) Oral every 6 hours  lactated ringers. 1000 milliLiter(s) (125 mL/Hr) IV Continuous <Continuous>  senna 2 Tablet(s) Oral at bedtime  simethicone 80 milliGRAM(s) Chew daily    MEDICATIONS  (PRN):  ondansetron Injectable 4 milliGRAM(s) IV Push every 6 hours PRN Nausea and/or Vomiting  oxyCODONE    IR 5 milliGRAM(s) Oral every 6 hours PRN Severe Pain (7 - 10)    
Chief Complaint:     HPI: Pt doing well, pain is well controlled (reports mild soreness), and mild nausea. She is tolerating PO, denies flatus, not OOB to chair, de la rosa draining ample dilute urine.     ROS: Denies cardiovascular or respiratory symptoms    Physical Exam  Vital Signs Last 24 Hrs  T(F): 98.3 (31 May 2022 16:00), Max: 98.3 (31 May 2022 16:00)  HR: 74 (31 May 2022 19:00) (64 - 81)  BP: 155/74 (31 May 2022 19:00) (133/75 - 180/84)  RR: 18 (31 May 2022 19:00) (15 - 27)       UO (min 55cc/hr): (4148-1857) 300cc, (4872-1358) 375cc      Physical exam:  General - AAOx3, NAD  Heart - S1S2, RRR  Lungs - CTA BL  Abdomen:  - Soft, nontender, nondistended, BS+  - Clean, dry, intact dressings in place over incision site  Pelvis/Vagina - No bleeding  Extremities - No calf tenderness, no swelling    Labs:                        14.0   13.48 )-----------( 374      ( 31 May 2022 14:15 )             42.5     139  |  103  |  16  ----------------------------<141  3.8  |  23  |  0.8    Magnesium, Serum: 1.9 mg/dL (05-31-22 @ 14:15)    Phosphorus Level, Serum: 2.7 mg/dL (05-31-22 @ 14:15)        
PGY 4 Progress note    Subjective: Patient seen and examined at bedside. Doing well, no complaints. No significant abdominal pain, no vaginal bleeding. Denies fever, chills, CP, SOB, N/V, LE pain. She is ambulating, voiding, passing flatus, tolerating regular diet.     Physical exam:    Vital Signs Last 24 Hrs  T(F): 97.5 (01 Jun 2022 23:43), Max: 98.3 (01 Jun 2022 15:31)  HR: 62 (01 Jun 2022 23:43) (60 - 72)  BP: 107/54 (01 Jun 2022 23:43) (107/54 - 139/63)  RR: 19 (01 Jun 2022 23:43) (18 - 20)  SpO2: 100% (01 Jun 2022 15:31) (98% - 100%)    Gen: NAD  CVS: s1s2, rrr  Lungs: ctab, no r/r/w  Abdomen: Soft, appropriately tender, no distension, +BS  Incision: well healing laparoscopic incision x5, steris in place, c/d/i  Pelvic: deferred, no VB  Ext: No calf tenderness    Diet: Regular  meds:   acetaminophen     Tablet ..   975 milliGRAM(s) Oral (06-02-22 @ 05:32)   975 milliGRAM(s) Oral (06-02-22 @ 01:25)   975 milliGRAM(s) Oral (06-01-22 @ 17:03)   975 milliGRAM(s) Oral (06-01-22 @ 11:38)   975 milliGRAM(s) Oral (06-01-22 @ 07:52)    apixaban   5 milliGRAM(s) Oral (06-02-22 @ 05:32)   5 milliGRAM(s) Oral (06-01-22 @ 17:03)   5 milliGRAM(s) Oral (06-01-22 @ 06:23)    ibuprofen  Tablet.   600 milliGRAM(s) Oral (06-02-22 @ 05:38)   600 milliGRAM(s) Oral (06-02-22 @ 01:24)   600 milliGRAM(s) Oral (06-01-22 @ 17:02)   600 milliGRAM(s) Oral (06-01-22 @ 11:38)   600 milliGRAM(s) Oral (06-01-22 @ 06:22)    oxyCODONE    IR   5 milliGRAM(s) Oral (06-01-22 @ 22:14)    senna   2 Tablet(s) Oral (06-01-22 @ 21:24)    simethicone   80 milliGRAM(s) Chew (06-01-22 @ 11:40)        LABS:                        14.0   13.48 )-----------( 374      ( 31 May 2022 14:15 )             42.5       05-31-22 @ 14:15      139  |  103  |  16  ----------------------------<  141<H>  3.8   |  23  |  0.8        Ca    9.3      31 May 2022 14:15  Phos  2.7     05-31  Mg     1.9     05-31    TPro  7.1  /  Alb  4.2  /  TBili  1.1  /  DBili  x   /  AST  28  /  ALT  21  /  AlkPhos  89  05-31-22 @ 14:15

## 2022-06-03 PROBLEM — I82.409 ACUTE EMBOLISM AND THROMBOSIS OF UNSPECIFIED DEEP VEINS OF UNSPECIFIED LOWER EXTREMITY: Chronic | Status: ACTIVE | Noted: 2022-03-08

## 2022-06-04 DIAGNOSIS — E78.5 HYPERLIPIDEMIA, UNSPECIFIED: ICD-10-CM

## 2022-06-04 DIAGNOSIS — Z79.01 LONG TERM (CURRENT) USE OF ANTICOAGULANTS: ICD-10-CM

## 2022-06-04 DIAGNOSIS — D25.9 LEIOMYOMA OF UTERUS, UNSPECIFIED: ICD-10-CM

## 2022-06-04 DIAGNOSIS — I10 ESSENTIAL (PRIMARY) HYPERTENSION: ICD-10-CM

## 2022-06-04 DIAGNOSIS — N72 INFLAMMATORY DISEASE OF CERVIX UTERI: ICD-10-CM

## 2022-06-04 DIAGNOSIS — Z86.718 PERSONAL HISTORY OF OTHER VENOUS THROMBOSIS AND EMBOLISM: ICD-10-CM

## 2022-06-04 DIAGNOSIS — K21.9 GASTRO-ESOPHAGEAL REFLUX DISEASE WITHOUT ESOPHAGITIS: ICD-10-CM

## 2022-06-04 DIAGNOSIS — Z91.041 RADIOGRAPHIC DYE ALLERGY STATUS: ICD-10-CM

## 2022-06-04 DIAGNOSIS — N88.8 OTHER SPECIFIED NONINFLAMMATORY DISORDERS OF CERVIX UTERI: ICD-10-CM

## 2022-06-04 DIAGNOSIS — Z88.2 ALLERGY STATUS TO SULFONAMIDES: ICD-10-CM

## 2022-06-13 ENCOUNTER — APPOINTMENT (OUTPATIENT)
Dept: OBGYN | Facility: CLINIC | Age: 72
End: 2022-06-13
Payer: MEDICARE

## 2022-06-13 ENCOUNTER — OUTPATIENT (OUTPATIENT)
Dept: OUTPATIENT SERVICES | Facility: HOSPITAL | Age: 72
LOS: 1 days | Discharge: HOME | End: 2022-06-13

## 2022-06-13 VITALS — BODY MASS INDEX: 41.41 KG/M2 | SYSTOLIC BLOOD PRESSURE: 138 MMHG | DIASTOLIC BLOOD PRESSURE: 91 MMHG | WEIGHT: 205 LBS

## 2022-06-13 DIAGNOSIS — Z86.018 PERSONAL HISTORY OF OTHER BENIGN NEOPLASM: ICD-10-CM

## 2022-06-13 DIAGNOSIS — Z98.890 OTHER SPECIFIED POSTPROCEDURAL STATES: Chronic | ICD-10-CM

## 2022-06-13 PROCEDURE — 99024 POSTOP FOLLOW-UP VISIT: CPT

## 2022-06-13 NOTE — HISTORY OF PRESENT ILLNESS
[Pain is well-controlled] : pain is well-controlled [Fever] : no fever [Chills] : no chills [Nausea] : no nausea [Vomiting] : no vomiting [Clean/Dry/Intact] : clean, dry and intact [Erythema] : not erythematous [Pathology reviewed] : pathology reviewed [de-identified] : Patient is 2 weeks s/p RATLH/BS for fibroid uterus.\par Having mild spotting. No fever. \par Pain well controlled.

## 2022-06-17 ENCOUNTER — OUTPATIENT (OUTPATIENT)
Dept: OUTPATIENT SERVICES | Facility: HOSPITAL | Age: 72
LOS: 1 days | Discharge: HOME | End: 2022-06-17

## 2022-06-17 ENCOUNTER — APPOINTMENT (OUTPATIENT)
Dept: INTERNAL MEDICINE | Facility: CLINIC | Age: 72
End: 2022-06-17
Payer: MEDICARE

## 2022-06-17 VITALS — HEIGHT: 59 IN | WEIGHT: 204 LBS | BODY MASS INDEX: 41.12 KG/M2

## 2022-06-17 DIAGNOSIS — Z98.890 OTHER SPECIFIED POSTPROCEDURAL STATES: Chronic | ICD-10-CM

## 2022-06-17 PROCEDURE — 99214 OFFICE O/P EST MOD 30 MIN: CPT | Mod: GC

## 2022-06-17 NOTE — HISTORY OF PRESENT ILLNESS
[FreeTextEntry1] : Numbness of the right shin [de-identified] : 71 F with hx of DVT on Eliquis, uterine fibroids and HPV + s/p GISELA/BSO (May 31), DM, HLD presents for follow up; she reports numbness in the right shin since the procedure. Denies any weakness, urinary or stool incontinence. She is back on Eliquis per Heme Onc for a total of 3 months. Feels well otherwise.

## 2022-06-17 NOTE — REVIEW OF SYSTEMS
[Negative] : Musculoskeletal [Chest Pain] : no chest pain [Palpitations] : no palpitations [Lower Ext Edema] : no lower extremity edema [de-identified] : Right shin numbness

## 2022-06-17 NOTE — PHYSICAL EXAM
[No Acute Distress] : no acute distress [Well-Appearing] : well-appearing [Normal] : no jugular venous distention, supple, no lymphadenopathy and the thyroid was normal and there were no nodules present [No Respiratory Distress] : no respiratory distress  [Clear to Auscultation] : lungs were clear to auscultation bilaterally [Normal Rate] : normal rate  [Regular Rhythm] : with a regular rhythm [Normal S1, S2] : normal S1 and S2 [Pedal Pulses Present] : the pedal pulses are present [No Edema] : there was no peripheral edema [Soft] : abdomen soft [de-identified] : Right shin decreased sensation

## 2022-06-17 NOTE — ASSESSMENT
[FreeTextEntry1] : 71 F with hx of DVT on Eliquis, uterine fibroids and HPV + s/p GISELA/BSO (May 31), DM, HLD presents for follow up; she reports numbness in the right shin since the procedure. Denies any weakness, urinary or stool incontinence. She is back on Eliquis per Heme Onc for a total of 3 months. Feels well otherwise.\par \par #LE DVT\par - Continue Eliquis per heme onc\par \par #Right shin numbness\par - Likely related to hysterectomy. No alarm signs\par - Continue to monitor\par \par #DM\par #HLD\par - Last HbA1c is 6.5, Diet and exercise advised. \par - No need for CDM (Pt wants to get CDM). Counseling done. \par - Low fat diet advised. \par - May 2021: A1c 6.5, \par - Check A1c, Lipid panel\par \par #HCM\par - Patient refusing colonoscopy and mammography\par - FIT testing\par - Routine labs\par - Ophthalmology referral requested by patient\par \par RTC in 6 months

## 2022-06-22 DIAGNOSIS — E78.5 HYPERLIPIDEMIA, UNSPECIFIED: ICD-10-CM

## 2022-06-22 DIAGNOSIS — I82.409 ACUTE EMBOLISM AND THROMBOSIS OF UNSPECIFIED DEEP VEINS OF UNSPECIFIED LOWER EXTREMITY: ICD-10-CM

## 2022-06-22 DIAGNOSIS — R20.0 ANESTHESIA OF SKIN: ICD-10-CM

## 2022-06-22 DIAGNOSIS — E11.9 TYPE 2 DIABETES MELLITUS WITHOUT COMPLICATIONS: ICD-10-CM

## 2022-06-30 LAB
ALBUMIN SERPL ELPH-MCNC: 4.3 G/DL
ALP BLD-CCNC: 120 U/L
ALT SERPL-CCNC: 20 U/L
ANION GAP SERPL CALC-SCNC: 12 MMOL/L
AST SERPL-CCNC: 21 U/L
BASOPHILS # BLD AUTO: 0.02 K/UL
BASOPHILS NFR BLD AUTO: 0.4 %
BILIRUB SERPL-MCNC: 0.3 MG/DL
BUN SERPL-MCNC: 15 MG/DL
CALCIUM SERPL-MCNC: 9.1 MG/DL
CHLORIDE SERPL-SCNC: 100 MMOL/L
CHOLEST SERPL-MCNC: 200 MG/DL
CO2 SERPL-SCNC: 24 MMOL/L
CREAT SERPL-MCNC: 0.7 MG/DL
EGFR: 93 ML/MIN/1.73M2
EOSINOPHIL # BLD AUTO: 0.17 K/UL
EOSINOPHIL NFR BLD AUTO: 3.3 %
ESTIMATED AVERAGE GLUCOSE: 131 MG/DL
GLUCOSE SERPL-MCNC: 126 MG/DL
HBA1C MFR BLD HPLC: 6.2 %
HCT VFR BLD CALC: 41.7 %
HDLC SERPL-MCNC: 60 MG/DL
HGB BLD-MCNC: 13.5 G/DL
IMM GRANULOCYTES NFR BLD AUTO: 0.4 %
LDLC SERPL CALC-MCNC: 124 MG/DL
LYMPHOCYTES # BLD AUTO: 2.45 K/UL
LYMPHOCYTES NFR BLD AUTO: 47.5 %
MAN DIFF?: NORMAL
MCHC RBC-ENTMCNC: 28 PG
MCHC RBC-ENTMCNC: 32.4 G/DL
MCV RBC AUTO: 86.3 FL
MONOCYTES # BLD AUTO: 0.45 K/UL
MONOCYTES NFR BLD AUTO: 8.7 %
NEUTROPHILS # BLD AUTO: 2.05 K/UL
NEUTROPHILS NFR BLD AUTO: 39.7 %
NONHDLC SERPL-MCNC: 140 MG/DL
PLATELET # BLD AUTO: 513 K/UL
POTASSIUM SERPL-SCNC: 4.2 MMOL/L
PROT SERPL-MCNC: 7.4 G/DL
RBC # BLD: 4.83 M/UL
RBC # FLD: 13.1 %
SODIUM SERPL-SCNC: 136 MMOL/L
TRIGL SERPL-MCNC: 82 MG/DL
WBC # FLD AUTO: 5.16 K/UL

## 2022-07-01 ENCOUNTER — NON-APPOINTMENT (OUTPATIENT)
Age: 72
End: 2022-07-01

## 2022-07-11 ENCOUNTER — LABORATORY RESULT (OUTPATIENT)
Age: 72
End: 2022-07-11

## 2022-07-11 ENCOUNTER — APPOINTMENT (OUTPATIENT)
Dept: OBGYN | Facility: CLINIC | Age: 72
End: 2022-07-11

## 2022-07-11 ENCOUNTER — OUTPATIENT (OUTPATIENT)
Dept: OUTPATIENT SERVICES | Facility: HOSPITAL | Age: 72
LOS: 1 days | Discharge: HOME | End: 2022-07-11

## 2022-07-11 VITALS
HEIGHT: 59 IN | WEIGHT: 206.56 LBS | DIASTOLIC BLOOD PRESSURE: 69 MMHG | BODY MASS INDEX: 41.64 KG/M2 | SYSTOLIC BLOOD PRESSURE: 137 MMHG

## 2022-07-11 DIAGNOSIS — Z98.890 OTHER SPECIFIED POSTPROCEDURAL STATES: Chronic | ICD-10-CM

## 2022-07-11 DIAGNOSIS — Z09 ENCOUNTER FOR FOLLOW-UP EXAMINATION AFTER COMPLETED TREATMENT FOR CONDITIONS OTHER THAN MALIGNANT NEOPLASM: ICD-10-CM

## 2022-07-11 PROCEDURE — 99024 POSTOP FOLLOW-UP VISIT: CPT

## 2022-07-11 NOTE — PLAN
[FreeTextEntry1] : Normal postop vaginal exam.\par Patient counselled.\par f/u 6 months for annual exam.\par

## 2022-07-11 NOTE — HISTORY OF PRESENT ILLNESS
[Pain is well-controlled] : pain is well-controlled [Fever] : no fever [Chills] : no chills [Nausea] : no nausea [Vomiting] : no vomiting [Clean/Dry/Intact] : clean, dry and intact [Erythema] : not erythematous [None] : no vaginal bleeding [Normal] : normal [Pathology reviewed] : pathology reviewed [de-identified] : Patient is 6 weeks s/p RATLH/BSO\par doing well.\par no complaints.

## 2022-07-12 ENCOUNTER — LABORATORY RESULT (OUTPATIENT)
Age: 72
End: 2022-07-12

## 2022-07-12 ENCOUNTER — OUTPATIENT (OUTPATIENT)
Dept: OUTPATIENT SERVICES | Facility: HOSPITAL | Age: 72
LOS: 1 days | Discharge: HOME | End: 2022-07-12

## 2022-07-12 ENCOUNTER — APPOINTMENT (OUTPATIENT)
Dept: HEMATOLOGY ONCOLOGY | Facility: CLINIC | Age: 72
End: 2022-07-12

## 2022-07-12 VITALS
DIASTOLIC BLOOD PRESSURE: 68 MMHG | HEART RATE: 77 BPM | WEIGHT: 208 LBS | SYSTOLIC BLOOD PRESSURE: 149 MMHG | TEMPERATURE: 97.6 F | RESPIRATION RATE: 14 BRPM | HEIGHT: 59 IN | BODY MASS INDEX: 41.93 KG/M2

## 2022-07-12 DIAGNOSIS — Z98.890 OTHER SPECIFIED POSTPROCEDURAL STATES: Chronic | ICD-10-CM

## 2022-07-12 DIAGNOSIS — I82.409 ACUTE EMBOLISM AND THROMBOSIS OF UNSPECIFIED DEEP VEINS OF UNSPECIFIED LOWER EXTREMITY: ICD-10-CM

## 2022-07-12 PROCEDURE — 99214 OFFICE O/P EST MOD 30 MIN: CPT

## 2022-07-12 NOTE — HISTORY OF PRESENT ILLNESS
[Disease:__________________________] : Disease: [unfilled] [de-identified] : The patient is coming for a follow up upon the recommendation of her physician who has recommended to see us for elevated platelet count.\par The patient was originally seen because of extensive DVT (left external iliac, common femoral, deep femoral, femoral, popliteal, gastrocnemius venous sinus, anterior tibial, posterior tibial and peroneal veins) thought to be provoked secondary to her pelvic mass (large fibroids). She had underwent GISELA-BSO; no malignancy was noted in the pathological specimens. She was on Eliquis which she had stopped after 1 month because she couldn't afford it financially; eventually help was found but now she is running out of it and will need at least another month's supply.\par The other reason has been thrombocytosis which apparently is getting slowly more pronounced. Her most recent CBC from June 17 showed a platelet count of 513 K. Previously, on 5/18/2021, her platelets were 442 and in 2017 428 K.\par \par The patient is denying any new problems.\par She has not had any infectious episodes and no recent thromboembolic events. It was felt that her DVTs were from the pelvic pathology and she was recommended at least 3 months of anticoagulation. Her DVT was diagnosed in March of this year and repeat duplex Doppler studies in April showed no residual clot.\par At present, she has no new complaints.\par Her present concern is the elevated platelet count.\par \par The situation was discussed with the patient at length. She was explained the role of the platelets. She was told about the differences between reactive and essential thrombocytosis.\par At this time will obtain further blood work including repeat CBC, JAK2, CMP and LDH.\par \par Further recommendations after those results are available.\par \par All her questions answered.

## 2022-07-13 DIAGNOSIS — D75.839 THROMBOCYTOSIS, UNSPECIFIED: ICD-10-CM

## 2022-07-13 LAB
ALBUMIN SERPL ELPH-MCNC: 4.3 G/DL
ALP BLD-CCNC: 120 U/L
ALT SERPL-CCNC: 23 U/L
ANION GAP SERPL CALC-SCNC: 12 MMOL/L
AST SERPL-CCNC: 26 U/L
BILIRUB SERPL-MCNC: 0.3 MG/DL
BUN SERPL-MCNC: 14 MG/DL
CALCIUM SERPL-MCNC: 9.2 MG/DL
CHLORIDE SERPL-SCNC: 103 MMOL/L
CO2 SERPL-SCNC: 26 MMOL/L
CREAT SERPL-MCNC: 0.7 MG/DL
EGFR: 93 ML/MIN/1.73M2
GLUCOSE SERPL-MCNC: 97 MG/DL
HCT VFR BLD CALC: 40.2 %
HGB BLD-MCNC: 13.4 G/DL
LDH SERPL-CCNC: 239
MCHC RBC-ENTMCNC: 28.2 PG
MCHC RBC-ENTMCNC: 33.3 G/DL
MCV RBC AUTO: 84.6 FL
PLATELET # BLD AUTO: 358 K/UL
PMV BLD: 9.2 FL
POTASSIUM SERPL-SCNC: 3.6 MMOL/L
PROT SERPL-MCNC: 7.7 G/DL
RBC # BLD: 4.75 M/UL
RBC # FLD: 12.7 %
SODIUM SERPL-SCNC: 141 MMOL/L
WBC # FLD AUTO: 5.98 K/UL

## 2022-07-25 ENCOUNTER — APPOINTMENT (OUTPATIENT)
Dept: OBGYN | Facility: CLINIC | Age: 72
End: 2022-07-25

## 2022-08-03 ENCOUNTER — NON-APPOINTMENT (OUTPATIENT)
Age: 72
End: 2022-08-03

## 2022-08-05 ENCOUNTER — NON-APPOINTMENT (OUTPATIENT)
Age: 72
End: 2022-08-05

## 2022-08-08 ENCOUNTER — NON-APPOINTMENT (OUTPATIENT)
Age: 72
End: 2022-08-08

## 2022-10-04 ENCOUNTER — NON-APPOINTMENT (OUTPATIENT)
Age: 72
End: 2022-10-04

## 2022-10-04 ENCOUNTER — APPOINTMENT (OUTPATIENT)
Dept: NEUROLOGY | Facility: CLINIC | Age: 72
End: 2022-10-04
Payer: MEDICARE

## 2022-10-04 ENCOUNTER — OUTPATIENT (OUTPATIENT)
Dept: OUTPATIENT SERVICES | Facility: HOSPITAL | Age: 72
LOS: 1 days | Discharge: HOME | End: 2022-10-04

## 2022-10-04 VITALS
OXYGEN SATURATION: 100 % | WEIGHT: 216 LBS | SYSTOLIC BLOOD PRESSURE: 128 MMHG | BODY MASS INDEX: 43.55 KG/M2 | TEMPERATURE: 97.4 F | HEIGHT: 59 IN | DIASTOLIC BLOOD PRESSURE: 88 MMHG | HEART RATE: 83 BPM

## 2022-10-04 DIAGNOSIS — Z98.890 OTHER SPECIFIED POSTPROCEDURAL STATES: Chronic | ICD-10-CM

## 2022-10-04 PROCEDURE — 99214 OFFICE O/P EST MOD 30 MIN: CPT | Mod: GC

## 2022-10-04 RX ORDER — GABAPENTIN 100 MG/1
100 CAPSULE ORAL 3 TIMES DAILY
Qty: 90 | Refills: 3 | Status: DISCONTINUED | COMMUNITY
Start: 2021-12-28 | End: 2022-10-04

## 2022-10-04 RX ORDER — TIZANIDINE HYDROCHLORIDE 2 MG/1
2 CAPSULE ORAL AT BEDTIME
Qty: 30 | Refills: 3 | Status: DISCONTINUED | COMMUNITY
Start: 2021-12-28 | End: 2022-10-04

## 2022-10-04 RX ORDER — MECLIZINE HYDROCHLORIDE 25 MG/1
25 TABLET ORAL 4 TIMES DAILY
Qty: 120 | Refills: 1 | Status: DISCONTINUED | COMMUNITY
Start: 2021-12-28 | End: 2022-10-04

## 2022-10-05 NOTE — END OF VISIT
[] : Resident [FreeTextEntry3] : Pt examined for headache and neck pain.  Likely cervicalgia and muscle-tension headaches.  Minimal TTP over temples.  WIll check ESR/CRP, get plainfilms of cervical spine and send for physical therapy.  She never completed previously ordered brain MRI \par so it has been reordered.  Return in 2 months.

## 2022-10-05 NOTE — ASSESSMENT
[FreeTextEntry1] : This 72y old female with headache and neck pain. need to r/o temporal arteritis (no red flags ) \par \par #Cervicalgia with radiculopathy \par #Low back pain \par - MR brain without contrast \par - c spine xray\par - PT referral for low back and neck pain\par - Tizanidine and gabapentin stopped by patient due to potential side effects \par - RTC in 3mo \par - ESR, CRP\par - Start Mg 400 mg qday\par \par #Tinnitus, chronic.\par #Vertigo improved.\par #Severe HA with flying \par - Counseled on use of Benadryl and decongestants to help with HA with flying \par - PT referral for vestibular rehab \par - Meclizine stopped by patient due to potential side effects \par - RTC in 3mo. \par

## 2022-10-05 NOTE — PHYSICAL EXAM
[FreeTextEntry1] : Constitutional: alert and well nourished. \par Psychiatric: oriented to person, place, and time, insight and judgment were intact, the affect was normal and recent memory was not impaired. \par Neurologic: \par Motor Strength:. strength was normal in both upper extremities. strength was normal in both lower extremities.  \par Eyes: the sclera and conjunctiva were normal. \par ENT: the ears and nose were normal in appearance and hearing was normal. \par Neck: the appearance of the neck was normal. \par Pulmonary: normal respiratory rhythm and effort and lungs were clear to auscultation bilaterally. \par Heart: heart rate was normal and rhythm regular and normal S1 and S2. \par Vascular:. there was no peripheral edema. \par Abdomen: normal bowel sounds and non-tender. \par Musculoskeletal: muscle strength and tone were normal. \par Skin: normal skin color and pigmentation.

## 2022-10-05 NOTE — HISTORY OF PRESENT ILLNESS
[FreeTextEntry1] : This 72y old female with PMH as below, presenting as follow up for her LBP, dizziness and headache. \par She did not do any of the tests, she had biopsy of her uterus, and had hysterectomy. \par She is still feel the pain from neck up\par The back is good "regular old age back pain". It comes when trying to hold thing. \par She still hearing noise in her ears, more in the Rt\par She is not taking meclizine. she is still having vertigo but less than before. \par Patient mentioned glaucoma in both eyes Rt worst than Lt\par \par \par \par previous note\par 71F w/ pmh DM, DLD, chronic back pain, tinnitus, headaches, vit D deficiency presenting for f/u. On last encounter was referred to get an MR brain and C-spine without contrast, which were not done. \par Recently admitted for 5 days starting February 6th for blood clot in left leg s/p thrombectomy. Patient was discharged on Eliquis, which is currently being held due to being scheduled for D+ C and biopsy. \par Has not been taking Tiznidine, gabapentin, and meclizine due to reading literature on side effects. \par Endorses improvement of lower back pain,which comes and goes. Endorses improvement of vertigo. Would like to hold off on physical therapy and MR until after her procedure.

## 2022-10-07 ENCOUNTER — RESULT REVIEW (OUTPATIENT)
Age: 72
End: 2022-10-07

## 2022-10-07 ENCOUNTER — OUTPATIENT (OUTPATIENT)
Dept: OUTPATIENT SERVICES | Facility: HOSPITAL | Age: 72
LOS: 1 days | Discharge: HOME | End: 2022-10-07

## 2022-10-07 DIAGNOSIS — Z98.890 OTHER SPECIFIED POSTPROCEDURAL STATES: Chronic | ICD-10-CM

## 2022-10-07 DIAGNOSIS — M54.2 CERVICALGIA: ICD-10-CM

## 2022-10-07 PROCEDURE — 72040 X-RAY EXAM NECK SPINE 2-3 VW: CPT | Mod: 26

## 2022-10-21 ENCOUNTER — RESULT REVIEW (OUTPATIENT)
Age: 72
End: 2022-10-21

## 2022-10-21 ENCOUNTER — OUTPATIENT (OUTPATIENT)
Dept: OUTPATIENT SERVICES | Facility: HOSPITAL | Age: 72
LOS: 1 days | Discharge: HOME | End: 2022-10-21
Payer: MEDICARE

## 2022-10-21 DIAGNOSIS — R51.9 HEADACHE, UNSPECIFIED: ICD-10-CM

## 2022-10-21 DIAGNOSIS — Z98.890 OTHER SPECIFIED POSTPROCEDURAL STATES: Chronic | ICD-10-CM

## 2022-10-21 PROCEDURE — 70553 MRI BRAIN STEM W/O & W/DYE: CPT | Mod: 26,MH

## 2022-11-10 ENCOUNTER — OUTPATIENT (OUTPATIENT)
Dept: OUTPATIENT SERVICES | Facility: HOSPITAL | Age: 72
LOS: 1 days | Discharge: HOME | End: 2022-11-10

## 2022-11-10 DIAGNOSIS — Z98.890 OTHER SPECIFIED POSTPROCEDURAL STATES: Chronic | ICD-10-CM

## 2022-11-10 DIAGNOSIS — R42 DIZZINESS AND GIDDINESS: ICD-10-CM

## 2022-11-11 NOTE — ED ADULT NURSE NOTE - NSINTERVENTIONOPT_GEN_ALL_ED
Patient ID: Daniela Andres is a 37 year old female.    Chief Complaint   Patient presents with   • Conjunctivitis     Started last night       37-year-old female presents with 1 day of symptoms.  Patient has been having bilateral eye redness and irritation.  She has been having yellow drainage.  Woke up with eyes crusted shut together.  She has recently got over URI symptoms.    Denies tearing,itching, burning, swelling, ptosis, or proptosis. No FB sensation.  Denies restricted or painful eye movement.  No visual changes : photophobia, diplopia, decreased/or loss of vision, flashes of light.   No trauma.  Does not wear contacts.        Recently had a baby 6 weeks ago.  She is not breast-feeding.      Past Medical History:   Diagnosis Date   • Abnormal Pap smear of cervix    • Anxiety    • Asthma    • COVID 2020   • Depression during pregnancy 7/20/2022   • History of panic attacks    • Mild acid reflux    • Missed ab    • Motion sickness        Past Surgical History:   Procedure Laterality Date   • Breast enhancement surgery Bilateral     IMPLANTS   • Colposcopy vag w/cerv w/bio     • Dilation and curettage of uterus  05/25/2021       Social History     Socioeconomic History   • Marital status: /Civil Union     Spouse name: Kristopher Andres   • Number of children: Not on file   • Years of education: Not on file   • Highest education level: High school graduate   Occupational History   • Occupation:    Tobacco Use   • Smoking status: Never Smoker   • Smokeless tobacco: Never Used   Vaping Use   • Vaping Use: never used   Substance and Sexual Activity   • Alcohol use: Yes     Comment: occasional glass wine   • Drug use: Never   • Sexual activity: Not Currently     Partners: Male     Birth control/protection: None   Other Topics Concern   • Not on file   Social History Narrative   • Not on file     Social Determinants of Health     Financial Resource Strain: Not on file   Food Insecurity: Not on file    Transportation Needs: Not on file   Physical Activity: Not on file   Stress: Not on file   Social Connections: Not on file   Intimate Partner Violence: Not At Risk   • Social Determinants: Intimate Partner Violence Past Fear: No   • Social Determinants: Intimate Partner Violence Current Fear: No       Family History   Problem Relation Age of Onset   • Cancer, Breast Mother    • Other Mother         leptomeningeal   • Other Maternal Grandfather         had brain tumor that was cancerous.  The cancerous tumor was removed but a benign tumor grew in that spot.   • Patient is unaware of any medical problems Father    • Patient is unaware of any medical problems Sister    • Atrial Fibrilliation Maternal Grandmother    • Patient is unaware of any medical problems Sister    • Cancer, Colon Neg Hx    • Cancer, Ovarian Neg Hx        ALLERGIES:   Allergen Reactions   • Food (Food Or Med) HIVES, SWELLING and SHORTNESS OF BREATH     QUINOA   • Kale   (Food Or Med) SWELLING, SHORTNESS OF BREATH and ANAPHYLAXIS   • Moxifloxacin HIVES       Current Outpatient Medications   Medication Sig Dispense Refill   • polymyxin b-trimethoprim (POLYTRIM) 71310-0.1 UNIT/ML-% ophthalmic solution Place 1 drop into both eyes every 6 hours for 7 days. 1 each 0   • sertraline (ZOLOFT) 25 MG tablet Take 1 tablet by mouth daily. 30 tablet 11   • docusate sodium-sennosides (SENOKOT S) 50-8.6 MG per tablet Take 2 tablets by mouth daily as needed for Constipation. 30 tablet 0   • Flovent  MCG/ACT inhaler 2 puffs 2 times daily.     • fluticasone (FLONASE) 50 MCG/ACT nasal spray Spray in each nostril daily as needed.     • albuterol 108 (90 Base) MCG/ACT inhaler Inhale 2 puffs into the lungs every 4 hours as needed.        No current facility-administered medications for this visit.       Review of Systems   Constitutional: Negative for activity change, appetite change, chills, diaphoresis, fatigue, fever and unexpected weight change.   HENT:  Negative for congestion, dental problem, drooling, ear discharge, ear pain, facial swelling, hearing loss, mouth sores, nosebleeds, postnasal drip, rhinorrhea, sinus pressure, sinus pain, sneezing, sore throat, tinnitus, trouble swallowing and voice change.    Eyes: Positive for discharge and redness. Negative for photophobia, pain, itching and visual disturbance.   Respiratory: Negative for apnea, cough, choking, chest tightness, shortness of breath, wheezing and stridor.    Cardiovascular: Negative for chest pain, palpitations and leg swelling.   Gastrointestinal: Negative for abdominal distention, abdominal pain, anal bleeding, blood in stool, constipation, diarrhea, nausea, rectal pain and vomiting.   Endocrine: Negative for cold intolerance, heat intolerance, polydipsia and polyphagia.   Genitourinary: Negative for decreased urine volume, difficulty urinating, dysuria, enuresis, flank pain, frequency, genital sores, hematuria and urgency.   Musculoskeletal: Negative for arthralgias, back pain, gait problem, joint swelling, myalgias, neck pain and neck stiffness.   Skin: Negative for color change, pallor, rash and wound.   Allergic/Immunologic: Negative for environmental allergies, food allergies and immunocompromised state.   Neurological: Negative for dizziness, tremors, seizures, syncope, facial asymmetry, speech difficulty, weakness, light-headedness, numbness and headaches.   Hematological: Negative for adenopathy. Does not bruise/bleed easily.   Psychiatric/Behavioral: Negative for agitation, behavioral problems, confusion, decreased concentration, dysphoric mood, hallucinations, self-injury and sleep disturbance. The patient is not nervous/anxious and is not hyperactive.        Visit Vitals  /79   Pulse 66   Temp 99.2 °F (37.3 °C) (Temporal)   Resp 14   Ht 5' 2\" (1.575 m)   Wt 59 kg (130 lb)   LMP 12/22/2021   SpO2 98%   BMI 23.78 kg/m²       Physical Exam  Vitals and nursing note reviewed.    Constitutional:       Appearance: She is well-developed.   HENT:      Head: Normocephalic and atraumatic.      Right Ear: Hearing, tympanic membrane, ear canal and external ear normal.      Left Ear: Hearing, tympanic membrane, ear canal and external ear normal.      Nose: Nose normal.      Right Sinus: No maxillary sinus tenderness or frontal sinus tenderness.      Left Sinus: No maxillary sinus tenderness or frontal sinus tenderness.      Mouth/Throat:      Lips: Pink.      Mouth: Mucous membranes are moist.      Pharynx: Oropharynx is clear. Uvula midline. No pharyngeal swelling, oropharyngeal exudate, posterior oropharyngeal erythema or uvula swelling.      Tonsils: No tonsillar exudate or tonsillar abscesses. 0 on the right. 0 on the left.      Comments: NO DROOLING. NO VESICLES, ULCERATION, INFLAMMATION. NO FULLNESS OR BULGING OF SOFT PALATE. NO PALATAL ULCERS OR PETECHIAE. UVULA MIDLINE WITH NO ERYTHEMA OR EDEMA. NO PAIN ON PALPATION OF JAW OR TMJ.     Neck: Normal range of motion and neck supple.   Eyes:      General: Lids are normal. Lids are everted, no foreign bodies appreciated. Vision grossly intact. Gaze aligned appropriately.         Right eye: No foreign body, discharge or hordeolum.         Left eye: No foreign body, discharge or hordeolum.      Extraocular Movements: Extraocular movements intact.      Conjunctiva/sclera:      Right eye: Right conjunctiva is injected. No chemosis, exudate or hemorrhage.     Left eye: Left conjunctiva is injected. No chemosis, exudate or hemorrhage.     Pupils: Pupils are equal, round, and reactive to light.      Comments: Patient has redness to both conjunctive and yellow discharge.    NO SIGNS OF PERIORBITAL INFECTION. NO ERYTHEMA, WARMTH, INDURATION. LIDS AND LASHES CLEAR WITHOUT ERYTHEMA,INFLAMMATION, CRUSTING, OR DISCHARGE. EYE OPEN. LIDS EVERTED- NO FB. SCLERA NORMAL. NO COBBLESTONE.   Neck:      Thyroid: No thyromegaly.      Vascular: No JVD.      Trachea:  No tracheal deviation.   Cardiovascular:      Rate and Rhythm: Normal rate and regular rhythm.      Heart sounds: Normal heart sounds. No murmur heard.    No friction rub. No gallop.   Pulmonary:      Effort: Pulmonary effort is normal. No respiratory distress.      Breath sounds: Normal breath sounds. No stridor. No decreased breath sounds, wheezing, rhonchi or rales.   Chest:      Chest wall: No tenderness.   Abdominal:      General: Bowel sounds are normal. There is no distension.      Palpations: Abdomen is soft. There is no mass.      Tenderness: There is no abdominal tenderness. There is no guarding or rebound.      Hernia: No hernia is present.   Musculoskeletal:         General: No tenderness or deformity. Normal range of motion.   Lymphadenopathy:      Cervical: No cervical adenopathy.   Skin:     General: Skin is warm.      Capillary Refill: Capillary refill takes less than 2 seconds.      Coloration: Skin is not pale.      Findings: No erythema or rash.   Neurological:      Mental Status: She is alert and oriented to person, place, and time.      Cranial Nerves: No cranial nerve deficit.      Sensory: No sensory deficit.      Motor: No abnormal muscle tone.      Coordination: Coordination normal.      Deep Tendon Reflexes: Reflexes normal.   Psychiatric:         Behavior: Behavior normal.         Thought Content: Thought content normal.         Judgment: Judgment normal.         Assessment & Plan:    Bacterial conjunctivitis  (primary encounter diagnosis)  Plan: polymyxin b-trimethoprim (POLYTRIM) 04055-0.1         UNIT/ML-% ophthalmic solution      Start eyedrops as prescribed  Good handwashing  Warm compresses to both eyes, 15 minutes, 3-4 times a day    Follow-up with ophthalmologist if symptoms not improving  Red flags discussed, go to ER with any worsening or concerning symptoms    See AVS    Discussion:    Side effects of all medications were discussed.  Patient is instructed to follow up in 2-3  days or as needed.  Patient is to go to the emergency room for any significant change or worsening.  Patient was discharged in a stable condition and was in agreement with the plan.  No further questions.      Yesika TURNER   [employer] Supervision

## 2022-11-25 ENCOUNTER — APPOINTMENT (OUTPATIENT)
Dept: INTERNAL MEDICINE | Facility: CLINIC | Age: 72
End: 2022-11-25

## 2023-01-09 ENCOUNTER — APPOINTMENT (OUTPATIENT)
Dept: OBGYN | Facility: CLINIC | Age: 73
End: 2023-01-09
Payer: MEDICARE

## 2023-01-09 ENCOUNTER — OUTPATIENT (OUTPATIENT)
Dept: OUTPATIENT SERVICES | Facility: HOSPITAL | Age: 73
LOS: 1 days | Discharge: HOME | End: 2023-01-09

## 2023-01-09 VITALS
HEIGHT: 59 IN | WEIGHT: 224 LBS | BODY MASS INDEX: 45.16 KG/M2 | DIASTOLIC BLOOD PRESSURE: 80 MMHG | SYSTOLIC BLOOD PRESSURE: 110 MMHG

## 2023-01-09 DIAGNOSIS — Z98.890 OTHER SPECIFIED POSTPROCEDURAL STATES: Chronic | ICD-10-CM

## 2023-01-09 DIAGNOSIS — N95.2 POSTMENOPAUSAL ATROPHIC VAGINITIS: ICD-10-CM

## 2023-01-09 PROCEDURE — 99214 OFFICE O/P EST MOD 30 MIN: CPT

## 2023-01-09 NOTE — PLAN
[FreeTextEntry1] : Normal exam.\par Mild atrophic vaginitis\par Will continue to observe as she is minimally symptomatic.\par Refused Mammo.\par I recommended that she get a colonoscopy (she never had one) - especially given her lower abdominal pain symptoms.\par She has an appointment with her primary in 1 week\par f/u 1 year

## 2023-01-09 NOTE — HISTORY OF PRESENT ILLNESS
[Patient declined mammogram] : Patient declined mammogram [FreeTextEntry1] : 72 s/p hysterectomy 6 months ago.\par C/O itching in the abdominal area.\par No vaginal bleeding.\par Also c/o occasional lower abdominal discomfort. [TextBox_19] : r

## 2023-01-09 NOTE — PHYSICAL EXAM
[Appropriately responsive] : appropriately responsive [Alert] : alert [No Acute Distress] : no acute distress [Soft] : soft [Non-tender] : non-tender [Non-distended] : non-distended [No HSM] : No HSM [No Lesions] : no lesions [No Mass] : no mass [Labia Majora] : normal [Labia Minora] : normal [Normal] : normal [Absent] : absent [Uterine Adnexae] : absent

## 2023-01-12 ENCOUNTER — OUTPATIENT (OUTPATIENT)
Dept: OUTPATIENT SERVICES | Facility: HOSPITAL | Age: 73
LOS: 1 days | Discharge: HOME | End: 2023-01-12

## 2023-01-12 ENCOUNTER — APPOINTMENT (OUTPATIENT)
Dept: INTERNAL MEDICINE | Facility: CLINIC | Age: 73
End: 2023-01-12
Payer: MEDICARE

## 2023-01-12 VITALS
OXYGEN SATURATION: 99 % | DIASTOLIC BLOOD PRESSURE: 81 MMHG | HEIGHT: 59 IN | SYSTOLIC BLOOD PRESSURE: 127 MMHG | TEMPERATURE: 97.5 F | BODY MASS INDEX: 44.23 KG/M2 | HEART RATE: 88 BPM | WEIGHT: 219.4 LBS

## 2023-01-12 DIAGNOSIS — Z98.890 OTHER SPECIFIED POSTPROCEDURAL STATES: Chronic | ICD-10-CM

## 2023-01-12 PROCEDURE — 99214 OFFICE O/P EST MOD 30 MIN: CPT | Mod: GC

## 2023-01-12 RX ORDER — PEN NEEDLE, DIABETIC 31 GX5/16"
NEEDLE, DISPOSABLE MISCELLANEOUS
Qty: 1 | Refills: 3 | Status: ACTIVE | COMMUNITY
Start: 2023-01-12 | End: 1900-01-01

## 2023-01-12 RX ORDER — ATORVASTATIN CALCIUM 40 MG/1
40 TABLET, FILM COATED ORAL DAILY
Qty: 30 | Refills: 2 | Status: DISCONTINUED | COMMUNITY
Start: 2020-09-17 | End: 2023-01-12

## 2023-01-12 RX ORDER — APIXABAN 5 MG/1
5 TABLET, FILM COATED ORAL
Qty: 60 | Refills: 3 | Status: DISCONTINUED | COMMUNITY
Start: 2022-04-21 | End: 2023-01-12

## 2023-01-12 RX ORDER — MULTIVIT-MIN/FOLIC/VIT K/LYCOP 400-300MCG
50 MCG TABLET ORAL
Qty: 30 | Refills: 2 | Status: DISCONTINUED | COMMUNITY
Start: 2020-09-17 | End: 2023-01-12

## 2023-01-12 RX ORDER — BLOOD-GLUCOSE METER
KIT MISCELLANEOUS TWICE DAILY
Qty: 1 | Refills: 3 | Status: ACTIVE | COMMUNITY
Start: 2023-01-12 | End: 1900-01-01

## 2023-01-12 RX ORDER — OMEGA-3/DHA/EPA/FISH OIL 300-1000MG
400 CAPSULE ORAL
Qty: 180 | Refills: 0 | Status: DISCONTINUED | COMMUNITY
Start: 2022-10-04 | End: 2023-01-12

## 2023-01-12 RX ORDER — APIXABAN 5 MG/1
5 TABLET, FILM COATED ORAL
Qty: 60 | Refills: 2 | Status: DISCONTINUED | COMMUNITY
Start: 2022-07-12 | End: 2023-01-12

## 2023-01-12 NOTE — ASSESSMENT
[FreeTextEntry1] : 72 F with hx of DVT s/p 3 months of Eliquis, uterine fibroids and HPV + s/p GISELA/BSO (May 31), DM, HLD, glaucoma presents for follow up.\par \par #Right face numbness\par - follows with neuro: MRI head and C spine Xray done\par - f/u with neuro for results and adequate management\par \par #LE DVT\par - s/p 3 month of Eliquis\par - negative duplex US in april\par - following with hemo/onc \par \par #DM\par - HbA1c (june 2022) 6.2\par - No need for CDM (Pt wants to get CDM). Counseled that she does not qualify or need it. \par - Counseled about diet and exercise \par - f/u labs:  A1c\par \par #Dyslipidemia:\par - counseled about diet and exercise\par - f/u lipid panel  \par - Started on Crestor. \par \par #Glaucoma:\par - following with ophtalmo\par - recommended carotid US (not done yet)\par - c/w Timolol BID \par - c/w Latanoprost QD \par \par #HCM\par - Patient refusing colonoscopy and mammography\par - Routine labs\par - Ophthalmology referral requested by patient\par - had covid booster today 1/12/23\par \par RTC in 6 months. \par \par

## 2023-01-12 NOTE — HISTORY OF PRESENT ILLNESS
[FreeTextEntry1] : Follow up [de-identified] : 72 F with hx of DVT s/p 3 months of Eliquis, uterine fibroids and HPV + s/p GISELA/BSO (May 31), DM, HLD, glaucoma presents for follow up; she reports numbness in the right face to righ scalp, she is following with neurology, MRI and xray of C spice was done, she still needs to do carotid US prescribed by ophtalmo. she is also requesting a CDM meter, however pt does not qualify or need it which was explained. Denies any weakness, urinary or stool incontinence, SOB, chest pain. \par  \par

## 2023-01-12 NOTE — PHYSICAL EXAM
[No Acute Distress] : no acute distress [Well Nourished] : well nourished [Well Developed] : well developed [PERRL] : pupils equal round and reactive to light [Normal Outer Ear/Nose] : the outer ears and nose were normal in appearance [No JVD] : no jugular venous distention [No Lymphadenopathy] : no lymphadenopathy [Supple] : supple [Thyroid Normal, No Nodules] : the thyroid was normal and there were no nodules present [No Respiratory Distress] : no respiratory distress  [No Accessory Muscle Use] : no accessory muscle use [Clear to Auscultation] : lungs were clear to auscultation bilaterally [Normal Rate] : normal rate  [Regular Rhythm] : with a regular rhythm [No Murmur] : no murmur heard [No Abdominal Bruit] : a ~M bruit was not heard ~T in the abdomen [No Edema] : there was no peripheral edema [Soft] : abdomen soft [Non Tender] : non-tender [Non-distended] : non-distended [Normal Bowel Sounds] : normal bowel sounds [No Joint Swelling] : no joint swelling [Grossly Normal Strength/Tone] : grossly normal strength/tone [No Rash] : no rash [Coordination Grossly Intact] : coordination grossly intact [No Focal Deficits] : no focal deficits [Normal Gait] : normal gait [Deep Tendon Reflexes (DTR)] : deep tendon reflexes were 2+ and symmetric [de-identified] : Red sclera

## 2023-01-12 NOTE — REVIEW OF SYSTEMS
[Fatigue] : fatigue [Redness] : redness [Vision Problems] : vision problems [Fever] : no fever [Night Sweats] : no night sweats [Discharge] : no discharge [Earache] : no earache [Nasal Discharge] : no nasal discharge [Chest Pain] : no chest pain [Palpitations] : no palpitations [Orthopnea] : no orthopnea [Shortness Of Breath] : no shortness of breath [Wheezing] : no wheezing [Cough] : no cough [Abdominal Pain] : no abdominal pain [Nausea] : no nausea [Constipation] : no constipation [Vomiting] : no vomiting [Dysuria] : no dysuria [Incontinence] : no incontinence [Frequency] : no frequency [Joint Pain] : no joint pain [Joint Stiffness] : no joint stiffness [Muscle Pain] : no muscle pain [Itching] : no itching [Skin Rash] : no skin rash [Headache] : no headache [Easy Bleeding] : no easy bleeding [Easy Bruising] : no easy bruising [de-identified] : pin and needle sensation of right face, neuro exam negative

## 2023-01-13 ENCOUNTER — APPOINTMENT (OUTPATIENT)
Dept: OPHTHALMOLOGY | Facility: CLINIC | Age: 73
End: 2023-01-13

## 2023-01-18 DIAGNOSIS — H40.9 UNSPECIFIED GLAUCOMA: ICD-10-CM

## 2023-01-18 DIAGNOSIS — R20.0 ANESTHESIA OF SKIN: ICD-10-CM

## 2023-01-18 DIAGNOSIS — E78.5 HYPERLIPIDEMIA, UNSPECIFIED: ICD-10-CM

## 2023-01-18 DIAGNOSIS — E11.9 TYPE 2 DIABETES MELLITUS WITHOUT COMPLICATIONS: ICD-10-CM

## 2023-02-28 ENCOUNTER — APPOINTMENT (OUTPATIENT)
Dept: NEUROLOGY | Facility: CLINIC | Age: 73
End: 2023-02-28

## 2023-04-18 ENCOUNTER — APPOINTMENT (OUTPATIENT)
Dept: NEUROLOGY | Facility: CLINIC | Age: 73
End: 2023-04-18
Payer: MEDICARE

## 2023-04-18 ENCOUNTER — OUTPATIENT (OUTPATIENT)
Dept: OUTPATIENT SERVICES | Facility: HOSPITAL | Age: 73
LOS: 1 days | End: 2023-04-18
Payer: MEDICARE

## 2023-04-18 VITALS
TEMPERATURE: 96.5 F | BODY MASS INDEX: 44.01 KG/M2 | WEIGHT: 218.31 LBS | OXYGEN SATURATION: 99 % | DIASTOLIC BLOOD PRESSURE: 88 MMHG | HEART RATE: 96 BPM | SYSTOLIC BLOOD PRESSURE: 137 MMHG | HEIGHT: 59 IN

## 2023-04-18 DIAGNOSIS — R51.9 HEADACHE, UNSPECIFIED: ICD-10-CM

## 2023-04-18 DIAGNOSIS — Z98.890 OTHER SPECIFIED POSTPROCEDURAL STATES: Chronic | ICD-10-CM

## 2023-04-18 DIAGNOSIS — M54.2 CERVICALGIA: ICD-10-CM

## 2023-04-18 DIAGNOSIS — Z00.00 ENCOUNTER FOR GENERAL ADULT MEDICAL EXAMINATION WITHOUT ABNORMAL FINDINGS: ICD-10-CM

## 2023-04-18 PROCEDURE — 99213 OFFICE O/P EST LOW 20 MIN: CPT

## 2023-04-18 NOTE — HISTORY OF PRESENT ILLNESS
[FreeTextEntry1] : This 72 years  old female with PMH as below, presenting as follow up for her neck pain, headache \par She is still feel the pain from neck up. It gets worse when she flies. She feels the pain starts from her neck and goes to her head and she feels her head going to explode. She was advised during last visit to try Benadryl and decongestants before flying but she never did as she felt it wouldn't help. \par Recently she noticed if a plane flies over her, she feels it more, she feels the " heaviness of the plane"\par She is still experiencing dizziness daily. PT helped a little but she stopped as she was sick during winter. \par

## 2023-04-18 NOTE — ASSESSMENT
[FreeTextEntry1] : This 72y old female with headache and neck pain. possibly due to cervicogenic headache with some component of occipital neuralgia. \par \par #Cervicalgia \par -  Xray showing degenerative changes.\par - Referral for PT for neck pain and stretching\par - Diclofenac gel TID topically over affected area. \par - MRI of C-spine w/o contrast to r/o any cervical pathology.\par - Pain Mx referral to evaluate for trigger point injection and occipital nerve block in the left. \par - RTC in 4 months. \par

## 2023-04-18 NOTE — END OF VISIT
[] : Resident [FreeTextEntry3] : I visited the patient with resident. Agree with history, physical exam and plan as above.\par

## 2023-04-18 NOTE — PHYSICAL EXAM
[FreeTextEntry1] : General: AAOx3, no aphasia, no dysarthria, follows commands.\par Cranial nerves: EOM intact without nystagmus, VF intact, no facial asymmetry. \par Motor: 5/5 both UE and LE B/L\par Sensory: intact throughout\par Co-ordination: FTN intact B/L\par Other: No tenderness over temporal region, tenderness over left temporal region.

## 2023-07-13 ENCOUNTER — APPOINTMENT (OUTPATIENT)
Dept: INTERNAL MEDICINE | Facility: CLINIC | Age: 73
End: 2023-07-13

## 2023-07-20 LAB
ALBUMIN SERPL ELPH-MCNC: 4.2 G/DL
ALP BLD-CCNC: 97 U/L
ALT SERPL-CCNC: 17 U/L
ANION GAP SERPL CALC-SCNC: 13 MMOL/L
AST SERPL-CCNC: 20 U/L
BILIRUB SERPL-MCNC: 0.3 MG/DL
BUN SERPL-MCNC: 13 MG/DL
CALCIUM SERPL-MCNC: 9.8 MG/DL
CHLORIDE SERPL-SCNC: 107 MMOL/L
CHOLEST SERPL-MCNC: 234 MG/DL
CO2 SERPL-SCNC: 23 MMOL/L
CREAT SERPL-MCNC: 0.8 MG/DL
EGFR: 79 ML/MIN/1.73M2
ESTIMATED AVERAGE GLUCOSE: 154 MG/DL
GLUCOSE SERPL-MCNC: 132 MG/DL
HBA1C MFR BLD HPLC: 7 %
HDLC SERPL-MCNC: 67 MG/DL
LDLC SERPL CALC-MCNC: 152 MG/DL
NONHDLC SERPL-MCNC: 167 MG/DL
POTASSIUM SERPL-SCNC: 4.1 MMOL/L
PROT SERPL-MCNC: 7.5 G/DL
SODIUM SERPL-SCNC: 143 MMOL/L
TRIGL SERPL-MCNC: 73 MG/DL

## 2023-08-10 ENCOUNTER — APPOINTMENT (OUTPATIENT)
Dept: INTERNAL MEDICINE | Facility: CLINIC | Age: 73
End: 2023-08-10
Payer: MEDICARE

## 2023-08-10 ENCOUNTER — OUTPATIENT (OUTPATIENT)
Dept: OUTPATIENT SERVICES | Facility: HOSPITAL | Age: 73
LOS: 1 days | End: 2023-08-10
Payer: MEDICARE

## 2023-08-10 VITALS
BODY MASS INDEX: 44.96 KG/M2 | HEART RATE: 98 BPM | HEIGHT: 59 IN | TEMPERATURE: 96.6 F | SYSTOLIC BLOOD PRESSURE: 120 MMHG | WEIGHT: 223 LBS | OXYGEN SATURATION: 100 % | DIASTOLIC BLOOD PRESSURE: 79 MMHG

## 2023-08-10 DIAGNOSIS — Z23 ENCOUNTER FOR IMMUNIZATION: ICD-10-CM

## 2023-08-10 DIAGNOSIS — D75.839 THROMBOCYTOSIS, UNSPECIFIED: ICD-10-CM

## 2023-08-10 DIAGNOSIS — H93.13 TINNITUS, BILATERAL: ICD-10-CM

## 2023-08-10 DIAGNOSIS — H26.9 UNSPECIFIED CATARACT: ICD-10-CM

## 2023-08-10 DIAGNOSIS — Z00.00 ENCOUNTER FOR GENERAL ADULT MEDICAL EXAMINATION WITHOUT ABNORMAL FINDINGS: ICD-10-CM

## 2023-08-10 DIAGNOSIS — M54.50 LOW BACK PAIN, UNSPECIFIED: ICD-10-CM

## 2023-08-10 DIAGNOSIS — Z98.890 OTHER SPECIFIED POSTPROCEDURAL STATES: Chronic | ICD-10-CM

## 2023-08-10 PROCEDURE — 90677 PCV20 VACCINE IM: CPT

## 2023-08-10 PROCEDURE — 99214 OFFICE O/P EST MOD 30 MIN: CPT

## 2023-08-10 PROCEDURE — 90471 IMMUNIZATION ADMIN: CPT | Mod: 25

## 2023-08-10 NOTE — ASSESSMENT
[FreeTextEntry1] : 72 F with hx of DVT s/p 3 months of Eliquis, uterine fibroids and HPV + s/p GISELA/BSO, DM, HLD, glaucoma presents for follow up.  #Cervicalgia #Right face numbness #reports h/o tinnitus  - Xray showing degenerative changes. - MR head no acute pathology  -following with neuro, who referred her to PT / pain management, prescribed diclofenac, and ordered MRI C spine  #DM - HbA1c 7 - Counseled about diet and exercise -repeat cbc before next visit   #thrombocytosis -platelets 444 -f/u repeat cbc in 3 months  #Dyslipidemia: -ldl 152 - patient was not taking Crestor, counseled on importance of statin  - diet and exercise encouraged  - f/u repeat lipid panel in 3 months  #pain in left thigh #walks with a limp #lower back pain -PT  #Glaucoma / cataracts: - following with ophtalmo - recommended carotid US (not done yet) - c/w Timolol BID - c/w Latanoprost QD  #h/o LE DVT - s/p 3 month of Eliquis - negative duplex US  #HCM - Patient refusing colonoscopy and mammography - fobt -administered Prevnar 20 today -rtc 3 months

## 2023-08-10 NOTE — HISTORY OF PRESENT ILLNESS
[FreeTextEntry1] : Follow Up [de-identified] : 72 F with hx of DVT s/p 3 months of Eliquis, uterine fibroids and HPV + s/p GISELA/BSO, DM, HLD, glaucoma presents for follow up. Patient is very pleasant. She wanted to go through lab results. She is following with neuro for her neck pain, still needs to have MRI done, instructed her to make sure MRI is done before she follows up with neuro. She is complaining of low back pain, and lower thigh pain, she walks with a limp as well, counselled her on importance of PT.

## 2023-08-10 NOTE — REVIEW OF SYSTEMS
[Joint Pain] : joint pain [Headache] : headache [Fever] : no fever [Pain] : no pain [Earache] : no earache [Chest Pain] : no chest pain [Shortness Of Breath] : no shortness of breath [Abdominal Pain] : no abdominal pain [Dysuria] : no dysuria [Hematuria] : no hematuria [Skin Rash] : no skin rash [Depression] : no depression [FreeTextEntry9] : low back pain, pain in left thigh [de-identified] : anxiety over paying bills

## 2023-08-10 NOTE — PHYSICAL EXAM
[No Acute Distress] : no acute distress [Well Nourished] : well nourished [Well Developed] : well developed [Well-Appearing] : well-appearing [EOMI] : extraocular movements intact [Normal Outer Ear/Nose] : the outer ears and nose were normal in appearance [Supple] : supple [Thyroid Normal, No Nodules] : the thyroid was normal and there were no nodules present [No Accessory Muscle Use] : no accessory muscle use [Clear to Auscultation] : lungs were clear to auscultation bilaterally [Normal Rate] : normal rate  [Regular Rhythm] : with a regular rhythm [Normal S1, S2] : normal S1 and S2 [No Murmur] : no murmur heard [No Edema] : there was no peripheral edema [Soft] : abdomen soft [Non Tender] : non-tender [Non-distended] : non-distended [Normal Anterior Cervical Nodes] : no anterior cervical lymphadenopathy [No CVA Tenderness] : no CVA  tenderness [No Spinal Tenderness] : no spinal tenderness [Normal Affect] : the affect was normal [Alert and Oriented x3] : oriented to person, place, and time [de-identified] : limps when walking  [de-identified] : no obvious rash on exposed skin  [de-identified] : limps when walking

## 2023-08-15 DIAGNOSIS — M54.50 LOW BACK PAIN, UNSPECIFIED: ICD-10-CM

## 2023-08-15 DIAGNOSIS — Z23 ENCOUNTER FOR IMMUNIZATION: ICD-10-CM

## 2023-08-15 DIAGNOSIS — E11.9 TYPE 2 DIABETES MELLITUS WITHOUT COMPLICATIONS: ICD-10-CM

## 2023-08-15 DIAGNOSIS — H93.13 TINNITUS, BILATERAL: ICD-10-CM

## 2023-08-15 DIAGNOSIS — H40.9 UNSPECIFIED GLAUCOMA: ICD-10-CM

## 2023-08-15 DIAGNOSIS — D75.839 THROMBOCYTOSIS, UNSPECIFIED: ICD-10-CM

## 2023-08-15 DIAGNOSIS — H26.9 UNSPECIFIED CATARACT: ICD-10-CM

## 2023-08-15 DIAGNOSIS — E78.5 HYPERLIPIDEMIA, UNSPECIFIED: ICD-10-CM

## 2023-08-15 DIAGNOSIS — M54.2 CERVICALGIA: ICD-10-CM

## 2023-08-16 ENCOUNTER — RESULT REVIEW (OUTPATIENT)
Age: 73
End: 2023-08-16

## 2023-08-16 ENCOUNTER — OUTPATIENT (OUTPATIENT)
Dept: OUTPATIENT SERVICES | Facility: HOSPITAL | Age: 73
LOS: 1 days | End: 2023-08-16
Payer: MEDICARE

## 2023-08-16 DIAGNOSIS — Z98.890 OTHER SPECIFIED POSTPROCEDURAL STATES: Chronic | ICD-10-CM

## 2023-08-16 DIAGNOSIS — M54.2 CERVICALGIA: ICD-10-CM

## 2023-08-16 PROCEDURE — 72141 MRI NECK SPINE W/O DYE: CPT

## 2023-08-16 PROCEDURE — 72141 MRI NECK SPINE W/O DYE: CPT | Mod: 26,MH

## 2023-08-17 DIAGNOSIS — M54.2 CERVICALGIA: ICD-10-CM

## 2023-08-29 ENCOUNTER — APPOINTMENT (OUTPATIENT)
Dept: NEUROLOGY | Facility: CLINIC | Age: 73
End: 2023-08-29
Payer: MEDICARE

## 2023-08-29 ENCOUNTER — OUTPATIENT (OUTPATIENT)
Dept: OUTPATIENT SERVICES | Facility: HOSPITAL | Age: 73
LOS: 1 days | End: 2023-08-29
Payer: MEDICARE

## 2023-08-29 DIAGNOSIS — Z00.00 ENCOUNTER FOR GENERAL ADULT MEDICAL EXAMINATION WITHOUT ABNORMAL FINDINGS: ICD-10-CM

## 2023-08-29 DIAGNOSIS — Z98.890 OTHER SPECIFIED POSTPROCEDURAL STATES: Chronic | ICD-10-CM

## 2023-08-29 PROCEDURE — 99213 OFFICE O/P EST LOW 20 MIN: CPT

## 2023-08-29 RX ORDER — CYCLOBENZAPRINE HYDROCHLORIDE 5 MG/1
5 TABLET, FILM COATED ORAL
Qty: 30 | Refills: 3 | Status: ACTIVE | COMMUNITY
Start: 2023-04-18 | End: 1900-01-01

## 2023-08-29 NOTE — ASSESSMENT
[FreeTextEntry1] : 72 years old female with PMH as below, presenting as follow up for her neck pain and headache.  Impression - Patient presents with continued chronic neck pain and headache. She had little relief from diclofenac gel but mild relief from cyclobenzaprine. Exam is notable for positive Spurling sign b/l. MRI was reviewed and showed mod-sev neuroforaminal stenosis b/l and multilevel degenerative changes. Her presentation is most consistent with cervicalgia with cervical radiculopathy.  Plan - Referral provided for PT - Referral provided for Pain Mgmt again - Refill provided for Cyclobenzaprine - Prescription provided for Meclizine 25mg PRN - RTC prn

## 2023-08-29 NOTE — PHYSICAL EXAM
[FreeTextEntry1] : Neurological Examination: General:  Appearance is consistent with chronologic age.  No abnormal facies.  Gross skin survey within normal limits.   Cognitive/Language:  Awake, alert, and oriented to person, place, time and date. Nondysarthric.   Cranial Nerves - Eyes: EOMI w/o nystagmus, skew or reported double vision. No ptosis/weakness of eyelid closure.   - Face:  Facial sensation normal V1 - 3, no facial asymmetry.   - Ears/Nose/Throat:  Hearing grossly intact  Motor examination:  Upper Extremities: L 5/5, R 5/5; Lower extremities: L 5/5, R 5/5. Positive spurling sign b/l Sensory examination:   Intact to light touch throughout Reflexes:   2+ b/l biceps, triceps, brachioradialis Cerebellum:   FTN/HKS intact.

## 2023-08-29 NOTE — HISTORY OF PRESENT ILLNESS
[FreeTextEntry1] : 72 years old female with PMH as below, presenting as follow up for her neck pain and headache.  Last visit was 4/18/23 at which time patient complained of continued neck pain and headache, exacerbated when an airplane flies overhead. She was prescribed diclofenac gel for presumed cervicalgia. MRI C-spine was ordered for further evaluation. She was also referred to [facility].  Since that visit, MR C-spine was performed (8/16/23) and showed degenerative changes producing multilevel mild spinal stenosis and C5-6 mod-sev neural foraminal stenosis.  Today she mentions that she has continued to have intermittent neck pain and headache. The neck pain is always on the left side and the headaches are unilateral but can be on either side. The neck pain is often associated with radiating pain and numbness down the LUE. She has never tried any PT for the neck pain. She tried the diclofenac gel with little relief but reports mild relief with cyclobenzaprine in the past. She chose not to go to [facility] because at the time, she was unwilling to accept "a needle in my neck".  She also complains of continued vertigo that is present every day. She previously tried vestibular therapy which did not seem to help. She was prescribed Meclizine in the past but chose not to take it because of side effects that she read about online.

## 2023-08-29 NOTE — END OF VISIT
[] : Resident [FreeTextEntry3] : Pt seen and discussed with neurology resident. Agree with assessment and plan.  [Time Spent: ___ minutes] : I have spent [unfilled] minutes of time on the encounter.

## 2023-09-06 DIAGNOSIS — M54.2 CERVICALGIA: ICD-10-CM

## 2023-10-25 ENCOUNTER — OUTPATIENT (OUTPATIENT)
Dept: OUTPATIENT SERVICES | Facility: HOSPITAL | Age: 73
LOS: 1 days | End: 2023-10-25
Payer: MEDICARE

## 2023-10-25 DIAGNOSIS — Z98.890 OTHER SPECIFIED POSTPROCEDURAL STATES: Chronic | ICD-10-CM

## 2023-10-25 PROCEDURE — 80061 LIPID PANEL: CPT

## 2023-10-25 PROCEDURE — 82274 ASSAY TEST FOR BLOOD FECAL: CPT

## 2023-10-25 PROCEDURE — 83036 HEMOGLOBIN GLYCOSYLATED A1C: CPT

## 2023-10-25 PROCEDURE — 85027 COMPLETE CBC AUTOMATED: CPT

## 2023-10-27 LAB
BASOPHILS # BLD AUTO: 0.04 K/UL
BASOPHILS NFR BLD AUTO: 0.7 %
CHOLEST SERPL-MCNC: 217 MG/DL
EOSINOPHIL # BLD AUTO: 0.22 K/UL
EOSINOPHIL NFR BLD AUTO: 3.7 %
ESTIMATED AVERAGE GLUCOSE: 148 MG/DL
HBA1C MFR BLD HPLC: 6.8 %
HCT VFR BLD CALC: 44.4 %
HDLC SERPL-MCNC: 64 MG/DL
HEMOCCULT STL QL IA: NEGATIVE
HGB BLD-MCNC: 14.2 G/DL
IMM GRANULOCYTES NFR BLD AUTO: 0.3 %
LDLC SERPL CALC-MCNC: 138 MG/DL
LYMPHOCYTES # BLD AUTO: 2.71 K/UL
LYMPHOCYTES NFR BLD AUTO: 45.9 %
MAN DIFF?: NORMAL
MCHC RBC-ENTMCNC: 28.3 PG
MCHC RBC-ENTMCNC: 32 G/DL
MCV RBC AUTO: 88.4 FL
MONOCYTES # BLD AUTO: 0.36 K/UL
MONOCYTES NFR BLD AUTO: 6.1 %
NEUTROPHILS # BLD AUTO: 2.56 K/UL
NEUTROPHILS NFR BLD AUTO: 43.3 %
NONHDLC SERPL-MCNC: 153 MG/DL
PLATELET # BLD AUTO: 496 K/UL
RBC # BLD: 5.02 M/UL
RBC # FLD: 13.6 %
TRIGL SERPL-MCNC: 75 MG/DL
WBC # FLD AUTO: 5.91 K/UL

## 2023-11-01 DIAGNOSIS — E11.9 TYPE 2 DIABETES MELLITUS WITHOUT COMPLICATIONS: ICD-10-CM

## 2023-11-02 DIAGNOSIS — E11.9 TYPE 2 DIABETES MELLITUS WITHOUT COMPLICATIONS: ICD-10-CM

## 2023-11-09 ENCOUNTER — APPOINTMENT (OUTPATIENT)
Dept: INTERNAL MEDICINE | Facility: CLINIC | Age: 73
End: 2023-11-09
Payer: MEDICARE

## 2023-11-09 ENCOUNTER — OUTPATIENT (OUTPATIENT)
Dept: OUTPATIENT SERVICES | Facility: HOSPITAL | Age: 73
LOS: 1 days | End: 2023-11-09
Payer: MEDICARE

## 2023-11-09 VITALS
DIASTOLIC BLOOD PRESSURE: 73 MMHG | OXYGEN SATURATION: 96 % | TEMPERATURE: 97 F | BODY MASS INDEX: 45.56 KG/M2 | HEART RATE: 101 BPM | HEIGHT: 59 IN | WEIGHT: 226 LBS | SYSTOLIC BLOOD PRESSURE: 139 MMHG

## 2023-11-09 DIAGNOSIS — I82.409 ACUTE EMBOLISM AND THROMBOSIS OF UNSPECIFIED DEEP VEINS OF UNSPECIFIED LOWER EXTREMITY: ICD-10-CM

## 2023-11-09 DIAGNOSIS — Z00.00 ENCOUNTER FOR GENERAL ADULT MEDICAL EXAMINATION WITHOUT ABNORMAL FINDINGS: ICD-10-CM

## 2023-11-09 DIAGNOSIS — Z98.890 OTHER SPECIFIED POSTPROCEDURAL STATES: Chronic | ICD-10-CM

## 2023-11-09 DIAGNOSIS — R20.0 ANESTHESIA OF SKIN: ICD-10-CM

## 2023-11-09 DIAGNOSIS — M54.2 CERVICALGIA: ICD-10-CM

## 2023-11-09 PROCEDURE — 99214 OFFICE O/P EST MOD 30 MIN: CPT

## 2023-11-09 RX ORDER — MECLIZINE HYDROCHLORIDE 25 MG/1
25 TABLET ORAL 3 TIMES DAILY
Qty: 90 | Refills: 2 | Status: DISCONTINUED | COMMUNITY
Start: 2023-08-29 | End: 2023-11-09

## 2023-11-13 DIAGNOSIS — H40.9 UNSPECIFIED GLAUCOMA: ICD-10-CM

## 2023-11-13 DIAGNOSIS — R20.0 ANESTHESIA OF SKIN: ICD-10-CM

## 2023-11-13 DIAGNOSIS — M54.2 CERVICALGIA: ICD-10-CM

## 2023-11-13 DIAGNOSIS — E78.5 HYPERLIPIDEMIA, UNSPECIFIED: ICD-10-CM

## 2023-11-13 DIAGNOSIS — E11.9 TYPE 2 DIABETES MELLITUS WITHOUT COMPLICATIONS: ICD-10-CM

## 2023-11-29 ENCOUNTER — OUTPATIENT (OUTPATIENT)
Dept: OUTPATIENT SERVICES | Facility: HOSPITAL | Age: 73
LOS: 1 days | End: 2023-11-29
Payer: MEDICARE

## 2023-11-29 ENCOUNTER — RESULT REVIEW (OUTPATIENT)
Age: 73
End: 2023-11-29

## 2023-11-29 DIAGNOSIS — H40.9 UNSPECIFIED GLAUCOMA: ICD-10-CM

## 2023-11-29 DIAGNOSIS — Z12.31 ENCOUNTER FOR SCREENING MAMMOGRAM FOR MALIGNANT NEOPLASM OF BREAST: ICD-10-CM

## 2023-11-29 DIAGNOSIS — Z98.890 OTHER SPECIFIED POSTPROCEDURAL STATES: Chronic | ICD-10-CM

## 2023-11-29 PROCEDURE — 93880 EXTRACRANIAL BILAT STUDY: CPT | Mod: 26

## 2023-11-29 PROCEDURE — 93880 EXTRACRANIAL BILAT STUDY: CPT

## 2023-11-30 DIAGNOSIS — H40.9 UNSPECIFIED GLAUCOMA: ICD-10-CM

## 2024-01-01 NOTE — PRE-ANESTHESIA EVALUATION ADULT - NSANTHOSAYNRD_GEN_A_CORE
No. JR screening performed.  STOP BANG Legend: 0-2 = LOW Risk; 3-4 = INTERMEDIATE Risk; 5-8 = HIGH Risk
No. RJ screening performed.  STOP BANG Legend: 0-2 = LOW Risk; 3-4 = INTERMEDIATE Risk; 5-8 = HIGH Risk
moderate assist (50% patients effort)

## 2024-02-03 NOTE — ASSESSMENT
[FreeTextEntry1] : 71 y  o f referred to us to discuss anticoagulation use and duration after recent DVT . \par \par # Left lower extremity extensive DVT with Doppler showing  acute deep vein thrombosis of the left external iliac, common femoral, deep femoral, femoral, popliteal, gastrocnemius venous sinus, anterior tibial, posterior tibial and peroneal veins s/p thrombectomy. No previous history of thromboembolic disease.\par Has been on Eliquis use for 1 month. \par Repeat Doppler negative.\par Likely provoked event sec to compromised circulation due to enlarged uterus.\par \par \par --Etiology, and management of provoked DVT was discussed with in detail. \par --She was told that it was likely a provoked event, and we do recommend that she should c/w AC at least for 3 months.\par -- Since she had Eliquis for 1 month only , we recommended her to resume Eliquis daily now. As the provoking factors are still present, i.e enlarged uterus with compromised blood flow, so, regardless of what repeat US is showing, AC is warranted for at least 3 months. At risk for recurrence unless provoking cause is addressed. \par --Eliquis should be stopped at least 48 hrs prior to surgery and it should be resumed 24-48 hrs after surgery, after adequate hemostasis is achieved. \par --Meanwhile in immediate post op period pt should be given DVT ppx. \par --After three months pt will be reassessed. No need for hypercoagulable w/u since its her first DVT that is provoked.  \par --Social workers will help pt to get Eliquis. \par \par --Pt demonstrated understanding and was encouraged to ask questions. \par \par Pt was seen and examined with Dr Sorensen who agreed with the evaluation and plan of care. 
No

## 2024-04-16 ENCOUNTER — OUTPATIENT (OUTPATIENT)
Dept: OUTPATIENT SERVICES | Facility: HOSPITAL | Age: 74
LOS: 1 days | End: 2024-04-16
Payer: MEDICARE

## 2024-04-16 ENCOUNTER — LABORATORY RESULT (OUTPATIENT)
Age: 74
End: 2024-04-16

## 2024-04-16 DIAGNOSIS — Z98.890 OTHER SPECIFIED POSTPROCEDURAL STATES: Chronic | ICD-10-CM

## 2024-04-16 DIAGNOSIS — Z00.00 ENCOUNTER FOR GENERAL ADULT MEDICAL EXAMINATION WITHOUT ABNORMAL FINDINGS: ICD-10-CM

## 2024-04-16 PROCEDURE — 81001 URINALYSIS AUTO W/SCOPE: CPT

## 2024-04-16 PROCEDURE — 80061 LIPID PANEL: CPT

## 2024-04-16 PROCEDURE — 36415 COLL VENOUS BLD VENIPUNCTURE: CPT

## 2024-04-16 PROCEDURE — 85027 COMPLETE CBC AUTOMATED: CPT

## 2024-04-16 PROCEDURE — 80053 COMPREHEN METABOLIC PANEL: CPT

## 2024-04-16 PROCEDURE — 84443 ASSAY THYROID STIM HORMONE: CPT

## 2024-04-16 PROCEDURE — 83036 HEMOGLOBIN GLYCOSYLATED A1C: CPT

## 2024-04-17 DIAGNOSIS — Z00.00 ENCOUNTER FOR GENERAL ADULT MEDICAL EXAMINATION WITHOUT ABNORMAL FINDINGS: ICD-10-CM

## 2024-04-17 LAB
ALBUMIN SERPL ELPH-MCNC: 4.3 G/DL
ALP BLD-CCNC: 89 U/L
ALT SERPL-CCNC: 16 U/L
ANION GAP SERPL CALC-SCNC: 12 MMOL/L
APPEARANCE: CLEAR
AST SERPL-CCNC: 17 U/L
BASOPHILS # BLD AUTO: 0.03 K/UL
BASOPHILS NFR BLD AUTO: 0.5 %
BILIRUB SERPL-MCNC: 0.3 MG/DL
BILIRUBIN URINE: NEGATIVE
BLOOD URINE: ABNORMAL
BUN SERPL-MCNC: 15 MG/DL
CALCIUM SERPL-MCNC: 10 MG/DL
CHLORIDE SERPL-SCNC: 104 MMOL/L
CHOLEST SERPL-MCNC: 220 MG/DL
CO2 SERPL-SCNC: 25 MMOL/L
COLOR: YELLOW
CREAT SERPL-MCNC: 0.8 MG/DL
EGFR: 78 ML/MIN/1.73M2
EOSINOPHIL # BLD AUTO: 0.2 K/UL
EOSINOPHIL NFR BLD AUTO: 3.1 %
ESTIMATED AVERAGE GLUCOSE: 148 MG/DL
GLUCOSE QUALITATIVE U: NEGATIVE MG/DL
GLUCOSE SERPL-MCNC: 129 MG/DL
HBA1C MFR BLD HPLC: 6.8 %
HCT VFR BLD CALC: 42.7 %
HDLC SERPL-MCNC: 65 MG/DL
HGB BLD-MCNC: 13.8 G/DL
IMM GRANULOCYTES NFR BLD AUTO: 0.2 %
KETONES URINE: NEGATIVE MG/DL
LDLC SERPL CALC-MCNC: 137 MG/DL
LEUKOCYTE ESTERASE URINE: NEGATIVE
LYMPHOCYTES # BLD AUTO: 2.54 K/UL
LYMPHOCYTES NFR BLD AUTO: 39.8 %
MAN DIFF?: NORMAL
MCHC RBC-ENTMCNC: 28.7 PG
MCHC RBC-ENTMCNC: 32.3 G/DL
MCV RBC AUTO: 88.8 FL
MONOCYTES # BLD AUTO: 0.57 K/UL
MONOCYTES NFR BLD AUTO: 8.9 %
NEUTROPHILS # BLD AUTO: 3.03 K/UL
NEUTROPHILS NFR BLD AUTO: 47.5 %
NITRITE URINE: NEGATIVE
NONHDLC SERPL-MCNC: 155 MG/DL
PH URINE: 5.5
PLATELET # BLD AUTO: 428 K/UL
PMV BLD AUTO: 0 /100 WBCS
POTASSIUM SERPL-SCNC: 4.4 MMOL/L
PROT SERPL-MCNC: 7.7 G/DL
PROTEIN URINE: NEGATIVE MG/DL
RBC # BLD: 4.81 M/UL
RBC # FLD: 13.7 %
SODIUM SERPL-SCNC: 141 MMOL/L
SPECIFIC GRAVITY URINE: >1.03
TRIGL SERPL-MCNC: 89 MG/DL
TSH SERPL-ACNC: 1.17 UIU/ML
UROBILINOGEN URINE: 0.2 MG/DL
WBC # FLD AUTO: 6.38 K/UL

## 2024-04-23 NOTE — ASU PREOP CHECKLIST - SELECT TESTS ORDERED
Head, normocephalic, atraumatic, Face, Face within normal limits, Ears, External ears within normal limits Type and Screen/Results in MD note/POCT Blood Glucose

## 2024-05-03 ENCOUNTER — APPOINTMENT (OUTPATIENT)
Dept: INTERNAL MEDICINE | Facility: CLINIC | Age: 74
End: 2024-05-03
Payer: MEDICARE

## 2024-05-03 ENCOUNTER — OUTPATIENT (OUTPATIENT)
Dept: OUTPATIENT SERVICES | Facility: HOSPITAL | Age: 74
LOS: 1 days | End: 2024-05-03
Payer: MEDICARE

## 2024-05-03 VITALS
HEIGHT: 59 IN | SYSTOLIC BLOOD PRESSURE: 137 MMHG | HEART RATE: 97 BPM | DIASTOLIC BLOOD PRESSURE: 85 MMHG | BODY MASS INDEX: 45.76 KG/M2 | WEIGHT: 227 LBS | OXYGEN SATURATION: 96 % | TEMPERATURE: 98.8 F

## 2024-05-03 DIAGNOSIS — E78.5 HYPERLIPIDEMIA, UNSPECIFIED: ICD-10-CM

## 2024-05-03 DIAGNOSIS — M54.2 CERVICALGIA: ICD-10-CM

## 2024-05-03 DIAGNOSIS — E11.9 TYPE 2 DIABETES MELLITUS W/OUT COMPLICATIONS: ICD-10-CM

## 2024-05-03 DIAGNOSIS — H40.9 UNSPECIFIED GLAUCOMA: ICD-10-CM

## 2024-05-03 DIAGNOSIS — Z98.890 OTHER SPECIFIED POSTPROCEDURAL STATES: Chronic | ICD-10-CM

## 2024-05-03 DIAGNOSIS — Z00.00 ENCOUNTER FOR GENERAL ADULT MEDICAL EXAMINATION WITHOUT ABNORMAL FINDINGS: ICD-10-CM

## 2024-05-03 PROCEDURE — 99214 OFFICE O/P EST MOD 30 MIN: CPT

## 2024-05-03 RX ORDER — DICLOFENAC SODIUM 1% 10 MG/G
1 GEL TOPICAL 3 TIMES DAILY
Qty: 1 | Refills: 1 | Status: DISCONTINUED | COMMUNITY
Start: 2023-04-18 | End: 2024-05-03

## 2024-05-03 RX ORDER — BLOOD-GLUCOSE SENSOR
EACH MISCELLANEOUS
Qty: 2 | Refills: 2 | Status: ACTIVE | COMMUNITY
Start: 2024-05-03 | End: 1900-01-01

## 2024-05-03 RX ORDER — BLOOD-GLUCOSE,RECEIVER,CONT
EACH MISCELLANEOUS
Qty: 1 | Refills: 0 | Status: ACTIVE | COMMUNITY
Start: 2024-05-03 | End: 1900-01-01

## 2024-05-03 RX ORDER — ROSUVASTATIN CALCIUM 5 MG/1
5 TABLET, FILM COATED ORAL DAILY
Qty: 90 | Refills: 1 | Status: ACTIVE | COMMUNITY
Start: 2023-01-12 | End: 1900-01-01

## 2024-05-03 NOTE — HISTORY OF PRESENT ILLNESS
[FreeTextEntry1] : Follow-up [de-identified] : 73 F with hx of DVT s/p 3 months of Eliquis, uterine fibroids and HPV + s/p GISELA/BSO (May 31), DM, HLD, glaucoma presents for follow up. Patient had c/o left sided neck pain previously and was advised to get [facility] neck. The patient is presented to discuss the results of the scan. Denied any other symptoms.

## 2024-05-03 NOTE — ASSESSMENT
[FreeTextEntry1] : 73 F with hx of DVT s/p 3 months of Eliquis, uterine fibroids and HPV + s/p GISELA/BSO (May 31), DM, HLD, glaucoma presents for follow up.  #Right face numbness #Cervicalgia #Neck pain -US doppler)11/23): No stenosis -MRI head, C spine Xray, and MRI Cervical Spine done>> Moderate stenosis of cervical stenosis with severe foraminal stenosis - Following w/ neuro>>was prescribed Cyclobenzaprine>>the patient refused to continue with Cyclobenzaprine -Advised to follow up with neurology  #LE DVT - s/p 3 month of Eliquis - negative duplex US in April, 2022, off AC now -red flag signs explained  #DM II -Advised to continue diet  low in processed sugar, high in carb and do regular exercise - HbA1c(4/24)6.8 -Ordered David 3 sensor and reader for glucose monitoring  #Dyslipidemia: -LDL(04/24) 137 - counseled about diet low in saturated fat and to perform exercise - C/w Crestor, refills sent  #Glaucoma: - following with ophtalmo - c/w Timolol BID - c/w Latanoprost QD  #HCM - Patient refusing colonoscopy and mammography -FOBT (10/23)is negative -Routine labs ordered  -flu shot refused -RTC in 6 months/prn with blood work

## 2024-05-03 NOTE — REVIEW OF SYSTEMS
[Pain] : pain [Vision Problems] : vision problems [Joint Pain] : joint pain [Joint Stiffness] : joint stiffness [Fever] : no fever [Chills] : no chills [Hearing Loss] : no hearing loss [Nasal Discharge] : no nasal discharge [Sore Throat] : no sore throat [Shortness Of Breath] : no shortness of breath [Wheezing] : no wheezing [Cough] : no cough [Abdominal Pain] : no abdominal pain [Nausea] : no nausea [Vomiting] : no vomiting [Dysuria] : no dysuria [Hematuria] : no hematuria [Headache] : no headache [Dizziness] : no dizziness [FreeTextEntry3] : Decrease vision in R eye

## 2024-05-03 NOTE — PHYSICAL EXAM
[No Acute Distress] : no acute distress [PERRL] : pupils equal round and reactive to light [EOMI] : extraocular movements intact [Normal Outer Ear/Nose] : the outer ears and nose were normal in appearance [Normal Oropharynx] : the oropharynx was normal [No JVD] : no jugular venous distention [Supple] : supple [No Respiratory Distress] : no respiratory distress  [No Accessory Muscle Use] : no accessory muscle use [Clear to Auscultation] : lungs were clear to auscultation bilaterally [Normal Rate] : normal rate  [Regular Rhythm] : with a regular rhythm [Normal S1, S2] : normal S1 and S2 [Soft] : abdomen soft [Non Tender] : non-tender [Non-distended] : non-distended [No CVA Tenderness] : no CVA  tenderness [No Spinal Tenderness] : no spinal tenderness [No Joint Swelling] : no joint swelling [Coordination Grossly Intact] : coordination grossly intact [No Focal Deficits] : no focal deficits [Normal Affect] : the affect was normal [Normal Insight/Judgement] : insight and judgment were intact [de-identified] : Decrease peripheral vison [de-identified] : 3/5 strenth

## 2024-05-07 DIAGNOSIS — E78.5 HYPERLIPIDEMIA, UNSPECIFIED: ICD-10-CM

## 2024-05-07 DIAGNOSIS — H40.9 UNSPECIFIED GLAUCOMA: ICD-10-CM

## 2024-05-07 DIAGNOSIS — E11.9 TYPE 2 DIABETES MELLITUS WITHOUT COMPLICATIONS: ICD-10-CM

## 2024-05-07 DIAGNOSIS — M54.2 CERVICALGIA: ICD-10-CM

## 2024-10-09 NOTE — ED PROVIDER NOTE - HIV OFFER
Lab Results   Component Value Date    EGFR 65 10/09/2024    EGFR 68 10/08/2024    EGFR 54 10/07/2024    CREATININE 1.12 10/09/2024    CREATININE 1.08 10/08/2024    CREATININE 1.30 10/07/2024     LIZ resolved  Cr baseline 1-1.2     Previously Declined (within the last year)

## 2024-11-12 ENCOUNTER — OUTPATIENT (OUTPATIENT)
Dept: OUTPATIENT SERVICES | Facility: HOSPITAL | Age: 74
LOS: 1 days | End: 2024-11-12
Payer: MEDICARE

## 2024-11-12 DIAGNOSIS — Z98.890 OTHER SPECIFIED POSTPROCEDURAL STATES: Chronic | ICD-10-CM

## 2024-11-12 DIAGNOSIS — Z00.00 ENCOUNTER FOR GENERAL ADULT MEDICAL EXAMINATION WITHOUT ABNORMAL FINDINGS: ICD-10-CM

## 2024-11-12 PROCEDURE — 82306 VITAMIN D 25 HYDROXY: CPT

## 2024-11-12 PROCEDURE — 84443 ASSAY THYROID STIM HORMONE: CPT

## 2024-11-12 PROCEDURE — 80048 BASIC METABOLIC PNL TOTAL CA: CPT

## 2024-11-12 PROCEDURE — 80061 LIPID PANEL: CPT

## 2024-11-12 PROCEDURE — 83036 HEMOGLOBIN GLYCOSYLATED A1C: CPT

## 2024-11-12 PROCEDURE — 85027 COMPLETE CBC AUTOMATED: CPT

## 2024-11-12 PROCEDURE — 36415 COLL VENOUS BLD VENIPUNCTURE: CPT

## 2024-11-13 DIAGNOSIS — Z00.00 ENCOUNTER FOR GENERAL ADULT MEDICAL EXAMINATION WITHOUT ABNORMAL FINDINGS: ICD-10-CM

## 2024-11-22 ENCOUNTER — APPOINTMENT (OUTPATIENT)
Dept: INTERNAL MEDICINE | Facility: CLINIC | Age: 74
End: 2024-11-22

## 2025-02-03 NOTE — PATIENT PROFILE ADULT - LEGAL HELP
Pt states staff at school with seizure like activity, pts hand was shaking, pt is anox4, hx of developmental delay. Waiting for grama.    no